# Patient Record
Sex: FEMALE | Race: WHITE | Employment: OTHER | URBAN - NONMETROPOLITAN AREA
[De-identification: names, ages, dates, MRNs, and addresses within clinical notes are randomized per-mention and may not be internally consistent; named-entity substitution may affect disease eponyms.]

---

## 2017-02-10 PROBLEM — E11.59 DIABETES MELLITUS WITH CIRCULATORY COMPLICATION (HCC): Status: ACTIVE | Noted: 2017-02-10

## 2017-02-10 PROBLEM — E11.59: Status: ACTIVE | Noted: 2017-02-10

## 2017-02-10 PROBLEM — E11.59: Status: RESOLVED | Noted: 2017-02-10 | Resolved: 2017-02-10

## 2017-02-10 PROBLEM — E11.59 DIABETES MELLITUS WITH CIRCULATORY COMPLICATION (HCC): Status: RESOLVED | Noted: 2017-02-10 | Resolved: 2017-02-10

## 2017-03-25 ENCOUNTER — HOSPITAL ENCOUNTER (INPATIENT)
Age: 82
LOS: 6 days | Discharge: NURSING, REHAB OR LONG-TERM CARE | DRG: 194 | End: 2017-03-31
Attending: EMERGENCY MEDICINE | Admitting: FAMILY MEDICINE
Payer: MEDICARE

## 2017-03-25 ENCOUNTER — APPOINTMENT (OUTPATIENT)
Dept: GENERAL RADIOLOGY | Age: 82
DRG: 194 | End: 2017-03-25
Payer: MEDICARE

## 2017-03-25 DIAGNOSIS — J18.9 PNEUMONIA OF LEFT LOWER LOBE DUE TO INFECTIOUS ORGANISM: Primary | ICD-10-CM

## 2017-03-25 DIAGNOSIS — I50.9 ACUTE ON CHRONIC CONGESTIVE HEART FAILURE, UNSPECIFIED CONGESTIVE HEART FAILURE TYPE: ICD-10-CM

## 2017-03-25 LAB
% CKMB: 4.9 % (ref 0–3)
-: ABNORMAL
ABSOLUTE EOS #: 0.3 K/UL (ref 0–0.4)
ABSOLUTE LYMPH #: 1.2 K/UL (ref 1–4.8)
ABSOLUTE MONO #: 0.3 K/UL (ref 0–1)
ALLEN TEST: ABNORMAL
AMORPHOUS: ABNORMAL
ANION GAP SERPL CALCULATED.3IONS-SCNC: 13 MMOL/L (ref 9–17)
BACTERIA: ABNORMAL
BASOPHILS # BLD: 1 % (ref 0–2)
BASOPHILS ABSOLUTE: 0.1 K/UL (ref 0–0.2)
BILIRUBIN URINE: NEGATIVE
BNP INTERPRETATION: ABNORMAL
BUN BLDV-MCNC: 39 MG/DL (ref 8–23)
BUN/CREAT BLD: 22 (ref 9–20)
CALCIUM SERPL-MCNC: 8.6 MG/DL (ref 8.6–10.4)
CARBOXYHEMOGLOBIN: ABNORMAL % (ref 0–5)
CASTS UA: ABNORMAL /LPF
CHLORIDE BLD-SCNC: 99 MMOL/L (ref 98–107)
CK MB: 2.8 NG/ML
CKMB INTERPRETATION: ABNORMAL
CO2: 26 MMOL/L (ref 20–31)
COLOR: YELLOW
COMMENT UA: ABNORMAL
CREAT SERPL-MCNC: 1.77 MG/DL (ref 0.5–0.9)
CRYSTALS, UA: ABNORMAL /HPF
DIFFERENTIAL TYPE: ABNORMAL
EOSINOPHILS RELATIVE PERCENT: 4 % (ref 0–8)
EPITHELIAL CELLS UA: ABNORMAL /HPF (ref 0–25)
FIO2: ABNORMAL
GFR AFRICAN AMERICAN: 33 ML/MIN
GFR NON-AFRICAN AMERICAN: 27 ML/MIN
GFR SERPL CREATININE-BSD FRML MDRD: ABNORMAL ML/MIN/{1.73_M2}
GFR SERPL CREATININE-BSD FRML MDRD: ABNORMAL ML/MIN/{1.73_M2}
GLUCOSE BLD-MCNC: 233 MG/DL (ref 70–99)
GLUCOSE BLD-MCNC: 314 MG/DL (ref 74–100)
GLUCOSE BLD-MCNC: 336 MG/DL (ref 74–100)
GLUCOSE BLD-MCNC: 353 MG/DL (ref 74–100)
GLUCOSE URINE: NEGATIVE
HCO3 VENOUS: 27 MMOL/L (ref 24–30)
HCT VFR BLD CALC: 29.1 % (ref 36–46)
HEMOGLOBIN: 9.2 G/DL (ref 12–16)
KETONES, URINE: NEGATIVE
LEUKOCYTE ESTERASE, URINE: ABNORMAL
LYMPHOCYTES # BLD: 15 % (ref 24–44)
MCH RBC QN AUTO: 26.5 PG (ref 26–34)
MCHC RBC AUTO-ENTMCNC: 31.7 G/DL (ref 31–37)
MCV RBC AUTO: 83.7 FL (ref 80–100)
METHEMOGLOBIN: ABNORMAL % (ref 0–1.9)
MODE: ABNORMAL
MONOCYTES # BLD: 4 % (ref 0–12)
MUCUS: ABNORMAL
NEGATIVE BASE EXCESS, VEN: ABNORMAL MMOL/L (ref 0–2)
NITRITE, URINE: NEGATIVE
NOTIFICATION TIME: ABNORMAL
NOTIFICATION: ABNORMAL
O2 DEVICE/FLOW/%: ABNORMAL
O2 SAT, VEN: 95.3 % (ref 60–85)
OTHER OBSERVATIONS UA: ABNORMAL
OXYHEMOGLOBIN: ABNORMAL % (ref 95–98)
PATIENT TEMP: 37
PCO2, VEN, TEMP ADJ: ABNORMAL MMHG (ref 39–55)
PCO2, VEN: 43 (ref 39–55)
PDW BLD-RTO: 16.8 % (ref 12.1–15.2)
PEEP/CPAP: ABNORMAL
PH UA: 5 (ref 5–9)
PH VENOUS: 7.42 (ref 7.32–7.42)
PH, VEN, TEMP ADJ: ABNORMAL (ref 7.32–7.42)
PLATELET # BLD: 264 K/UL (ref 140–450)
PLATELET ESTIMATE: ABNORMAL
PMV BLD AUTO: 9.9 FL (ref 6–12)
PO2, VEN, TEMP ADJ: ABNORMAL MMHG (ref 30–50)
PO2, VEN: 75.8 (ref 30–50)
POSITIVE BASE EXCESS, VEN: 2.1 MMOL/L (ref 0–2)
POTASSIUM SERPL-SCNC: 4.5 MMOL/L (ref 3.7–5.3)
PRO-BNP: 7052 PG/ML
PROTEIN UA: ABNORMAL
PSV: ABNORMAL
PT. POSITION: ABNORMAL
RBC # BLD: 3.48 M/UL (ref 4–5.2)
RBC # BLD: ABNORMAL 10*6/UL
RBC UA: ABNORMAL /HPF (ref 0–2)
RENAL EPITHELIAL, UA: ABNORMAL /HPF
RESPIRATORY RATE: 20
SAMPLE SITE: ABNORMAL
SEG NEUTROPHILS: 76 % (ref 36–66)
SEGMENTED NEUTROPHILS ABSOLUTE COUNT: 6.4 K/UL (ref 1.8–7.7)
SET RATE: ABNORMAL
SODIUM BLD-SCNC: 138 MMOL/L (ref 135–144)
SPECIFIC GRAVITY UA: 1.01 (ref 1.01–1.02)
TEXT FOR RESPIRATORY: ABNORMAL
TOTAL CK: 57 U/L (ref 26–192)
TOTAL HB: ABNORMAL G/DL (ref 12–16)
TOTAL RATE: ABNORMAL
TRICHOMONAS: ABNORMAL
TROPONIN INTERP: NORMAL
TROPONIN T: <0.03 NG/ML
TURBIDITY: CLEAR
URINE HGB: ABNORMAL
UROBILINOGEN, URINE: NORMAL
VT: ABNORMAL
WBC # BLD: 8.4 K/UL (ref 3.5–11)
WBC # BLD: ABNORMAL 10*3/UL
WBC UA: ABNORMAL /HPF (ref 0–5)
YEAST: ABNORMAL

## 2017-03-25 PROCEDURE — 82550 ASSAY OF CK (CPK): CPT

## 2017-03-25 PROCEDURE — 2580000003 HC RX 258: Performed by: FAMILY MEDICINE

## 2017-03-25 PROCEDURE — 82553 CREATINE MB FRACTION: CPT

## 2017-03-25 PROCEDURE — 85025 COMPLETE CBC W/AUTO DIFF WBC: CPT

## 2017-03-25 PROCEDURE — 99285 EMERGENCY DEPT VISIT HI MDM: CPT

## 2017-03-25 PROCEDURE — 94640 AIRWAY INHALATION TREATMENT: CPT

## 2017-03-25 PROCEDURE — 2580000003 HC RX 258: Performed by: EMERGENCY MEDICINE

## 2017-03-25 PROCEDURE — 94760 N-INVAS EAR/PLS OXIMETRY 1: CPT

## 2017-03-25 PROCEDURE — G8997 SWALLOW GOAL STATUS: HCPCS

## 2017-03-25 PROCEDURE — 6370000000 HC RX 637 (ALT 250 FOR IP): Performed by: INTERNAL MEDICINE

## 2017-03-25 PROCEDURE — 6360000002 HC RX W HCPCS: Performed by: EMERGENCY MEDICINE

## 2017-03-25 PROCEDURE — 6360000002 HC RX W HCPCS: Performed by: FAMILY MEDICINE

## 2017-03-25 PROCEDURE — 82947 ASSAY GLUCOSE BLOOD QUANT: CPT

## 2017-03-25 PROCEDURE — 6370000000 HC RX 637 (ALT 250 FOR IP): Performed by: EMERGENCY MEDICINE

## 2017-03-25 PROCEDURE — 83880 ASSAY OF NATRIURETIC PEPTIDE: CPT

## 2017-03-25 PROCEDURE — G8996 SWALLOW CURRENT STATUS: HCPCS

## 2017-03-25 PROCEDURE — 36415 COLL VENOUS BLD VENIPUNCTURE: CPT

## 2017-03-25 PROCEDURE — 1200000000 HC SEMI PRIVATE

## 2017-03-25 PROCEDURE — 94664 DEMO&/EVAL PT USE INHALER: CPT

## 2017-03-25 PROCEDURE — 93005 ELECTROCARDIOGRAM TRACING: CPT

## 2017-03-25 PROCEDURE — 84484 ASSAY OF TROPONIN QUANT: CPT

## 2017-03-25 PROCEDURE — 96375 TX/PRO/DX INJ NEW DRUG ADDON: CPT

## 2017-03-25 PROCEDURE — 82805 BLOOD GASES W/O2 SATURATION: CPT

## 2017-03-25 PROCEDURE — 92526 ORAL FUNCTION THERAPY: CPT

## 2017-03-25 PROCEDURE — 87040 BLOOD CULTURE FOR BACTERIA: CPT

## 2017-03-25 PROCEDURE — G8998 SWALLOW D/C STATUS: HCPCS

## 2017-03-25 PROCEDURE — 81001 URINALYSIS AUTO W/SCOPE: CPT

## 2017-03-25 PROCEDURE — 6370000000 HC RX 637 (ALT 250 FOR IP): Performed by: FAMILY MEDICINE

## 2017-03-25 PROCEDURE — 94667 MNPJ CHEST WALL 1ST: CPT

## 2017-03-25 PROCEDURE — 80048 BASIC METABOLIC PNL TOTAL CA: CPT

## 2017-03-25 PROCEDURE — 71020 XR CHEST STANDARD TWO VW: CPT

## 2017-03-25 PROCEDURE — 96365 THER/PROPH/DIAG IV INF INIT: CPT

## 2017-03-25 RX ORDER — SODIUM CHLORIDE 9 MG/ML
INJECTION, SOLUTION INTRAVENOUS CONTINUOUS
Status: DISCONTINUED | OUTPATIENT
Start: 2017-03-25 | End: 2017-03-26

## 2017-03-25 RX ORDER — ALLOPURINOL 300 MG/1
300 TABLET ORAL DAILY
Status: DISCONTINUED | OUTPATIENT
Start: 2017-03-25 | End: 2017-03-25 | Stop reason: DRUGHIGH

## 2017-03-25 RX ORDER — FUROSEMIDE 10 MG/ML
60 INJECTION INTRAMUSCULAR; INTRAVENOUS ONCE
Status: COMPLETED | OUTPATIENT
Start: 2017-03-25 | End: 2017-03-25

## 2017-03-25 RX ORDER — ALBUTEROL SULFATE 2.5 MG/3ML
2.5 SOLUTION RESPIRATORY (INHALATION) EVERY 4 HOURS PRN
Status: DISCONTINUED | OUTPATIENT
Start: 2017-03-25 | End: 2017-03-31 | Stop reason: HOSPADM

## 2017-03-25 RX ORDER — ACETAMINOPHEN 325 MG/1
650 TABLET ORAL EVERY 4 HOURS PRN
Status: DISCONTINUED | OUTPATIENT
Start: 2017-03-25 | End: 2017-03-31 | Stop reason: HOSPADM

## 2017-03-25 RX ORDER — IPRATROPIUM BROMIDE AND ALBUTEROL SULFATE 2.5; .5 MG/3ML; MG/3ML
1 SOLUTION RESPIRATORY (INHALATION) 4 TIMES DAILY
Status: DISCONTINUED | OUTPATIENT
Start: 2017-03-25 | End: 2017-03-31 | Stop reason: HOSPADM

## 2017-03-25 RX ORDER — IPRATROPIUM BROMIDE AND ALBUTEROL SULFATE 2.5; .5 MG/3ML; MG/3ML
1 SOLUTION RESPIRATORY (INHALATION) ONCE
Status: COMPLETED | OUTPATIENT
Start: 2017-03-25 | End: 2017-03-25

## 2017-03-25 RX ORDER — SODIUM CHLORIDE 0.9 % (FLUSH) 0.9 %
10 SYRINGE (ML) INJECTION EVERY 12 HOURS SCHEDULED
Status: DISCONTINUED | OUTPATIENT
Start: 2017-03-25 | End: 2017-03-31 | Stop reason: HOSPADM

## 2017-03-25 RX ORDER — ROSUVASTATIN CALCIUM 5 MG/1
5 TABLET, COATED ORAL NIGHTLY
Status: DISCONTINUED | OUTPATIENT
Start: 2017-03-25 | End: 2017-03-29 | Stop reason: CLARIF

## 2017-03-25 RX ORDER — LEVOTHYROXINE SODIUM 88 UG/1
88 TABLET ORAL DAILY
Status: DISCONTINUED | OUTPATIENT
Start: 2017-03-25 | End: 2017-03-31 | Stop reason: HOSPADM

## 2017-03-25 RX ORDER — GABAPENTIN 100 MG/1
100 CAPSULE ORAL 3 TIMES DAILY
Status: DISCONTINUED | OUTPATIENT
Start: 2017-03-25 | End: 2017-03-31 | Stop reason: HOSPADM

## 2017-03-25 RX ORDER — FLUTICASONE PROPIONATE 50 MCG
1 SPRAY, SUSPENSION (ML) NASAL DAILY
Status: DISCONTINUED | OUTPATIENT
Start: 2017-03-25 | End: 2017-03-31 | Stop reason: HOSPADM

## 2017-03-25 RX ORDER — ONDANSETRON 2 MG/ML
4 INJECTION INTRAMUSCULAR; INTRAVENOUS EVERY 6 HOURS PRN
Status: DISCONTINUED | OUTPATIENT
Start: 2017-03-25 | End: 2017-03-31 | Stop reason: HOSPADM

## 2017-03-25 RX ORDER — DEXTROSE MONOHYDRATE 50 MG/ML
100 INJECTION, SOLUTION INTRAVENOUS PRN
Status: DISCONTINUED | OUTPATIENT
Start: 2017-03-25 | End: 2017-03-31 | Stop reason: HOSPADM

## 2017-03-25 RX ORDER — DEXTROSE MONOHYDRATE 25 G/50ML
12.5 INJECTION, SOLUTION INTRAVENOUS PRN
Status: DISCONTINUED | OUTPATIENT
Start: 2017-03-25 | End: 2017-03-31 | Stop reason: HOSPADM

## 2017-03-25 RX ORDER — ALLOPURINOL 100 MG/1
200 TABLET ORAL DAILY
Status: DISCONTINUED | OUTPATIENT
Start: 2017-03-25 | End: 2017-03-31 | Stop reason: HOSPADM

## 2017-03-25 RX ORDER — NICOTINE POLACRILEX 4 MG
15 LOZENGE BUCCAL PRN
Status: DISCONTINUED | OUTPATIENT
Start: 2017-03-25 | End: 2017-03-31 | Stop reason: HOSPADM

## 2017-03-25 RX ORDER — MECLIZINE HYDROCHLORIDE 25 MG/1
25 TABLET ORAL 3 TIMES DAILY PRN
Status: DISCONTINUED | OUTPATIENT
Start: 2017-03-25 | End: 2017-03-31 | Stop reason: HOSPADM

## 2017-03-25 RX ORDER — SODIUM CHLORIDE 0.9 % (FLUSH) 0.9 %
10 SYRINGE (ML) INJECTION PRN
Status: DISCONTINUED | OUTPATIENT
Start: 2017-03-25 | End: 2017-03-31 | Stop reason: HOSPADM

## 2017-03-25 RX ORDER — METHYLPREDNISOLONE SODIUM SUCCINATE 125 MG/2ML
125 INJECTION, POWDER, LYOPHILIZED, FOR SOLUTION INTRAMUSCULAR; INTRAVENOUS ONCE
Status: COMPLETED | OUTPATIENT
Start: 2017-03-25 | End: 2017-03-25

## 2017-03-25 RX ORDER — AMLODIPINE BESYLATE 10 MG/1
10 TABLET ORAL DAILY
Status: DISCONTINUED | OUTPATIENT
Start: 2017-03-25 | End: 2017-03-29

## 2017-03-25 RX ORDER — FERROUS SULFATE 325(65) MG
325 TABLET ORAL
Status: DISCONTINUED | OUTPATIENT
Start: 2017-03-25 | End: 2017-03-31 | Stop reason: HOSPADM

## 2017-03-25 RX ORDER — HYDROCHLOROTHIAZIDE 25 MG/1
12.5 TABLET ORAL DAILY
Status: DISCONTINUED | OUTPATIENT
Start: 2017-03-25 | End: 2017-03-27

## 2017-03-25 RX ORDER — IPRATROPIUM BROMIDE AND ALBUTEROL SULFATE 2.5; .5 MG/3ML; MG/3ML
1 SOLUTION RESPIRATORY (INHALATION)
Status: DISCONTINUED | OUTPATIENT
Start: 2017-03-25 | End: 2017-03-25

## 2017-03-25 RX ORDER — CLOPIDOGREL BISULFATE 75 MG/1
75 TABLET ORAL DAILY
Status: DISCONTINUED | OUTPATIENT
Start: 2017-03-25 | End: 2017-03-31 | Stop reason: HOSPADM

## 2017-03-25 RX ADMIN — HYDROCHLOROTHIAZIDE 12.5 MG: 25 TABLET ORAL at 09:30

## 2017-03-25 RX ADMIN — ALLOPURINOL 200 MG: 100 TABLET ORAL at 10:17

## 2017-03-25 RX ADMIN — INSULIN LISPRO 2 UNITS: 100 INJECTION, SOLUTION INTRAVENOUS; SUBCUTANEOUS at 20:53

## 2017-03-25 RX ADMIN — IPRATROPIUM BROMIDE AND ALBUTEROL SULFATE 1 AMPULE: .5; 3 SOLUTION RESPIRATORY (INHALATION) at 21:51

## 2017-03-25 RX ADMIN — AZITHROMYCIN MONOHYDRATE 500 MG: 500 INJECTION, POWDER, LYOPHILIZED, FOR SOLUTION INTRAVENOUS at 05:56

## 2017-03-25 RX ADMIN — METFORMIN HYDROCHLORIDE 1000 MG: 500 TABLET, FILM COATED ORAL at 16:45

## 2017-03-25 RX ADMIN — CLOPIDOGREL BISULFATE 75 MG: 75 TABLET ORAL at 10:20

## 2017-03-25 RX ADMIN — IPRATROPIUM BROMIDE AND ALBUTEROL SULFATE 1 AMPULE: .5; 3 SOLUTION RESPIRATORY (INHALATION) at 03:11

## 2017-03-25 RX ADMIN — ENOXAPARIN SODIUM 30 MG: 30 INJECTION SUBCUTANEOUS at 09:30

## 2017-03-25 RX ADMIN — FLUTICASONE PROPIONATE 1 SPRAY: 50 SPRAY, METERED NASAL at 10:17

## 2017-03-25 RX ADMIN — GABAPENTIN 100 MG: 100 CAPSULE ORAL at 10:18

## 2017-03-25 RX ADMIN — FUROSEMIDE 60 MG: 10 INJECTION, SOLUTION INTRAMUSCULAR; INTRAVENOUS at 04:42

## 2017-03-25 RX ADMIN — LEVOTHYROXINE SODIUM 88 MCG: 88 TABLET ORAL at 10:18

## 2017-03-25 RX ADMIN — FERROUS SULFATE TAB 325 MG (65 MG ELEMENTAL FE) 325 MG: 325 (65 FE) TAB at 10:20

## 2017-03-25 RX ADMIN — METFORMIN HYDROCHLORIDE 1000 MG: 500 TABLET, FILM COATED ORAL at 09:30

## 2017-03-25 RX ADMIN — INSULIN LISPRO 5 UNITS: 100 INJECTION, SOLUTION INTRAVENOUS; SUBCUTANEOUS at 16:45

## 2017-03-25 RX ADMIN — AMLODIPINE BESYLATE 10 MG: 10 TABLET ORAL at 10:18

## 2017-03-25 RX ADMIN — METHYLPREDNISOLONE SODIUM SUCCINATE 125 MG: 125 INJECTION, POWDER, FOR SOLUTION INTRAMUSCULAR; INTRAVENOUS at 03:26

## 2017-03-25 RX ADMIN — GABAPENTIN 100 MG: 100 CAPSULE ORAL at 20:53

## 2017-03-25 RX ADMIN — IPRATROPIUM BROMIDE AND ALBUTEROL SULFATE 1 AMPULE: .5; 3 SOLUTION RESPIRATORY (INHALATION) at 14:08

## 2017-03-25 RX ADMIN — SODIUM CHLORIDE: 9 INJECTION, SOLUTION INTRAVENOUS at 10:26

## 2017-03-25 RX ADMIN — GABAPENTIN 100 MG: 100 CAPSULE ORAL at 14:30

## 2017-03-25 RX ADMIN — INSULIN LISPRO 4 UNITS: 100 INJECTION, SOLUTION INTRAVENOUS; SUBCUTANEOUS at 11:59

## 2017-03-25 RX ADMIN — CEFTRIAXONE 1 G: 1 INJECTION, POWDER, FOR SOLUTION INTRAMUSCULAR; INTRAVENOUS at 05:26

## 2017-03-25 ASSESSMENT — ENCOUNTER SYMPTOMS
COUGH: 1
ABDOMINAL DISTENTION: 0
DIARRHEA: 0
FACIAL SWELLING: 0
BACK PAIN: 0
CHOKING: 0
CHEST TIGHTNESS: 0
CONSTIPATION: 0
NAUSEA: 0
VOMITING: 0
SHORTNESS OF BREATH: 1
TROUBLE SWALLOWING: 0
SORE THROAT: 0
ABDOMINAL PAIN: 0
WHEEZING: 1

## 2017-03-26 LAB
ABSOLUTE EOS #: 0 K/UL (ref 0–0.4)
ABSOLUTE LYMPH #: 0.87 K/UL (ref 1–4.8)
ABSOLUTE MONO #: 0 K/UL (ref 0–1)
ALBUMIN SERPL-MCNC: 3.3 G/DL (ref 3.5–5.2)
ALBUMIN/GLOBULIN RATIO: 0.9 (ref 1–2.5)
ALP BLD-CCNC: 79 U/L (ref 35–104)
ALT SERPL-CCNC: 9 U/L (ref 5–33)
ANION GAP SERPL CALCULATED.3IONS-SCNC: 14 MMOL/L (ref 9–17)
AST SERPL-CCNC: 11 U/L
BASOPHILS # BLD: 0 % (ref 0–2)
BASOPHILS ABSOLUTE: 0 K/UL (ref 0–0.2)
BILIRUB SERPL-MCNC: <0.1 MG/DL (ref 0.3–1.2)
BUN BLDV-MCNC: 53 MG/DL (ref 8–23)
BUN/CREAT BLD: 23 (ref 9–20)
CALCIUM SERPL-MCNC: 8.2 MG/DL (ref 8.6–10.4)
CHLORIDE BLD-SCNC: 99 MMOL/L (ref 98–107)
CO2: 25 MMOL/L (ref 20–31)
CREAT SERPL-MCNC: 2.26 MG/DL (ref 0.5–0.9)
DIFFERENTIAL TYPE: ABNORMAL
EOSINOPHILS RELATIVE PERCENT: 0 % (ref 0–8)
GFR AFRICAN AMERICAN: 25 ML/MIN
GFR NON-AFRICAN AMERICAN: 20 ML/MIN
GFR SERPL CREATININE-BSD FRML MDRD: ABNORMAL ML/MIN/{1.73_M2}
GFR SERPL CREATININE-BSD FRML MDRD: ABNORMAL ML/MIN/{1.73_M2}
GLUCOSE BLD-MCNC: 177 MG/DL (ref 74–100)
GLUCOSE BLD-MCNC: 233 MG/DL (ref 74–100)
GLUCOSE BLD-MCNC: 260 MG/DL (ref 70–99)
GLUCOSE BLD-MCNC: 285 MG/DL (ref 74–100)
GLUCOSE BLD-MCNC: 308 MG/DL (ref 74–100)
HCT VFR BLD CALC: 26.1 % (ref 36–46)
HEMOGLOBIN: 8.2 G/DL (ref 12–16)
LYMPHOCYTES # BLD: 11 % (ref 24–44)
MCH RBC QN AUTO: 26.2 PG (ref 26–34)
MCHC RBC AUTO-ENTMCNC: 31.4 G/DL (ref 31–37)
MCV RBC AUTO: 83.5 FL (ref 80–100)
MONOCYTES # BLD: 0 % (ref 0–12)
MORPHOLOGY: NORMAL
PDW BLD-RTO: 16.8 % (ref 12.1–15.2)
PLATELET # BLD: 256 K/UL (ref 140–450)
PLATELET ESTIMATE: ABNORMAL
PMV BLD AUTO: 10.2 FL (ref 6–12)
POTASSIUM SERPL-SCNC: 5.2 MMOL/L (ref 3.7–5.3)
RBC # BLD: 3.12 M/UL (ref 4–5.2)
RBC # BLD: ABNORMAL 10*6/UL
SEG NEUTROPHILS: 89 % (ref 36–66)
SEGMENTED NEUTROPHILS ABSOLUTE COUNT: 7.03 K/UL (ref 1.8–7.7)
SODIUM BLD-SCNC: 138 MMOL/L (ref 135–144)
TOTAL PROTEIN: 6.8 G/DL (ref 6.4–8.3)
WBC # BLD: 7.9 K/UL (ref 3.5–11)
WBC # BLD: ABNORMAL 10*3/UL

## 2017-03-26 PROCEDURE — 6370000000 HC RX 637 (ALT 250 FOR IP): Performed by: INTERNAL MEDICINE

## 2017-03-26 PROCEDURE — 94668 MNPJ CHEST WALL SBSQ: CPT

## 2017-03-26 PROCEDURE — 1200000000 HC SEMI PRIVATE

## 2017-03-26 PROCEDURE — 94640 AIRWAY INHALATION TREATMENT: CPT

## 2017-03-26 PROCEDURE — 85025 COMPLETE CBC W/AUTO DIFF WBC: CPT

## 2017-03-26 PROCEDURE — 80053 COMPREHEN METABOLIC PANEL: CPT

## 2017-03-26 PROCEDURE — 2580000003 HC RX 258: Performed by: FAMILY MEDICINE

## 2017-03-26 PROCEDURE — 82947 ASSAY GLUCOSE BLOOD QUANT: CPT

## 2017-03-26 PROCEDURE — 6360000002 HC RX W HCPCS: Performed by: FAMILY MEDICINE

## 2017-03-26 PROCEDURE — 94664 DEMO&/EVAL PT USE INHALER: CPT

## 2017-03-26 PROCEDURE — 36415 COLL VENOUS BLD VENIPUNCTURE: CPT

## 2017-03-26 PROCEDURE — 94760 N-INVAS EAR/PLS OXIMETRY 1: CPT

## 2017-03-26 PROCEDURE — 6370000000 HC RX 637 (ALT 250 FOR IP): Performed by: FAMILY MEDICINE

## 2017-03-26 RX ORDER — FUROSEMIDE 10 MG/ML
40 INJECTION INTRAMUSCULAR; INTRAVENOUS ONCE
Status: COMPLETED | OUTPATIENT
Start: 2017-03-26 | End: 2017-03-26

## 2017-03-26 RX ADMIN — Medication 10 ML: at 21:36

## 2017-03-26 RX ADMIN — IPRATROPIUM BROMIDE AND ALBUTEROL SULFATE 1 AMPULE: .5; 3 SOLUTION RESPIRATORY (INHALATION) at 21:50

## 2017-03-26 RX ADMIN — ALLOPURINOL 200 MG: 100 TABLET ORAL at 09:39

## 2017-03-26 RX ADMIN — ENOXAPARIN SODIUM 30 MG: 30 INJECTION SUBCUTANEOUS at 09:40

## 2017-03-26 RX ADMIN — LEVOTHYROXINE SODIUM 88 MCG: 88 TABLET ORAL at 07:44

## 2017-03-26 RX ADMIN — GABAPENTIN 100 MG: 100 CAPSULE ORAL at 21:36

## 2017-03-26 RX ADMIN — HYDROCHLOROTHIAZIDE 12.5 MG: 25 TABLET ORAL at 09:40

## 2017-03-26 RX ADMIN — IPRATROPIUM BROMIDE AND ALBUTEROL SULFATE 1 AMPULE: .5; 3 SOLUTION RESPIRATORY (INHALATION) at 15:56

## 2017-03-26 RX ADMIN — FLUTICASONE PROPIONATE 1 SPRAY: 50 SPRAY, METERED NASAL at 09:00

## 2017-03-26 RX ADMIN — INSULIN LISPRO 3 UNITS: 100 INJECTION, SOLUTION INTRAVENOUS; SUBCUTANEOUS at 12:32

## 2017-03-26 RX ADMIN — INSULIN LISPRO 1 UNITS: 100 INJECTION, SOLUTION INTRAVENOUS; SUBCUTANEOUS at 17:32

## 2017-03-26 RX ADMIN — FERROUS SULFATE TAB 325 MG (65 MG ELEMENTAL FE) 325 MG: 325 (65 FE) TAB at 07:45

## 2017-03-26 RX ADMIN — ACETAMINOPHEN 650 MG: 325 TABLET, FILM COATED ORAL at 05:54

## 2017-03-26 RX ADMIN — INSULIN LISPRO 4 UNITS: 100 INJECTION, SOLUTION INTRAVENOUS; SUBCUTANEOUS at 07:49

## 2017-03-26 RX ADMIN — CLOPIDOGREL BISULFATE 75 MG: 75 TABLET ORAL at 09:40

## 2017-03-26 RX ADMIN — IPRATROPIUM BROMIDE AND ALBUTEROL SULFATE 1 AMPULE: .5; 3 SOLUTION RESPIRATORY (INHALATION) at 10:29

## 2017-03-26 RX ADMIN — CEFTRIAXONE 1 G: 1 INJECTION, POWDER, FOR SOLUTION INTRAMUSCULAR; INTRAVENOUS at 04:56

## 2017-03-26 RX ADMIN — AZITHROMYCIN MONOHYDRATE 500 MG: 500 INJECTION, POWDER, LYOPHILIZED, FOR SOLUTION INTRAVENOUS at 05:54

## 2017-03-26 RX ADMIN — GABAPENTIN 100 MG: 100 CAPSULE ORAL at 09:40

## 2017-03-26 RX ADMIN — INSULIN LISPRO 1 UNITS: 100 INJECTION, SOLUTION INTRAVENOUS; SUBCUTANEOUS at 21:36

## 2017-03-26 RX ADMIN — IPRATROPIUM BROMIDE AND ALBUTEROL SULFATE 1 AMPULE: .5; 3 SOLUTION RESPIRATORY (INHALATION) at 05:35

## 2017-03-26 RX ADMIN — GABAPENTIN 100 MG: 100 CAPSULE ORAL at 14:17

## 2017-03-26 RX ADMIN — FUROSEMIDE 40 MG: 10 INJECTION, SOLUTION INTRAMUSCULAR; INTRAVENOUS at 11:20

## 2017-03-26 RX ADMIN — METFORMIN HYDROCHLORIDE 1000 MG: 500 TABLET, FILM COATED ORAL at 07:44

## 2017-03-26 RX ADMIN — AMLODIPINE BESYLATE 10 MG: 10 TABLET ORAL at 09:40

## 2017-03-26 ASSESSMENT — PAIN SCALES - GENERAL
PAINLEVEL_OUTOF10: 0
PAINLEVEL_OUTOF10: 0
PAINLEVEL_OUTOF10: 7

## 2017-03-27 LAB
ABSOLUTE EOS #: 0.1 K/UL (ref 0–0.4)
ABSOLUTE LYMPH #: 1.5 K/UL (ref 1–4.8)
ABSOLUTE MONO #: 0.4 K/UL (ref 0–1)
ALBUMIN SERPL-MCNC: 3.4 G/DL (ref 3.5–5.2)
ALBUMIN/GLOBULIN RATIO: 0.9 (ref 1–2.5)
ALP BLD-CCNC: 80 U/L (ref 35–104)
ALT SERPL-CCNC: 10 U/L (ref 5–33)
ANION GAP SERPL CALCULATED.3IONS-SCNC: 14 MMOL/L (ref 9–17)
AST SERPL-CCNC: 13 U/L
BASOPHILS # BLD: 1 % (ref 0–2)
BASOPHILS ABSOLUTE: 0 K/UL (ref 0–0.2)
BILIRUB SERPL-MCNC: <0.1 MG/DL (ref 0.3–1.2)
BUN BLDV-MCNC: 62 MG/DL (ref 8–23)
BUN/CREAT BLD: 26 (ref 9–20)
CALCIUM SERPL-MCNC: 8.4 MG/DL (ref 8.6–10.4)
CHLORIDE BLD-SCNC: 97 MMOL/L (ref 98–107)
CO2: 26 MMOL/L (ref 20–31)
CREAT SERPL-MCNC: 2.37 MG/DL (ref 0.5–0.9)
DIFFERENTIAL TYPE: ABNORMAL
EKG ATRIAL RATE: 78 BPM
EKG P AXIS: 55 DEGREES
EKG P-R INTERVAL: 150 MS
EKG Q-T INTERVAL: 384 MS
EKG QRS DURATION: 84 MS
EKG QTC CALCULATION (BAZETT): 437 MS
EKG R AXIS: 63 DEGREES
EKG T AXIS: 14 DEGREES
EKG VENTRICULAR RATE: 78 BPM
EOSINOPHILS RELATIVE PERCENT: 1 % (ref 0–8)
GFR AFRICAN AMERICAN: 23 ML/MIN
GFR NON-AFRICAN AMERICAN: 19 ML/MIN
GFR SERPL CREATININE-BSD FRML MDRD: ABNORMAL ML/MIN/{1.73_M2}
GFR SERPL CREATININE-BSD FRML MDRD: ABNORMAL ML/MIN/{1.73_M2}
GLUCOSE BLD-MCNC: 184 MG/DL (ref 74–100)
GLUCOSE BLD-MCNC: 200 MG/DL (ref 74–100)
GLUCOSE BLD-MCNC: 205 MG/DL (ref 74–100)
GLUCOSE BLD-MCNC: 221 MG/DL (ref 74–100)
GLUCOSE BLD-MCNC: 230 MG/DL (ref 70–99)
HCT VFR BLD CALC: 26.3 % (ref 36–46)
HEMOGLOBIN: 8.4 G/DL (ref 12–16)
LYMPHOCYTES # BLD: 16 % (ref 24–44)
MCH RBC QN AUTO: 26.8 PG (ref 26–34)
MCHC RBC AUTO-ENTMCNC: 32 G/DL (ref 31–37)
MCV RBC AUTO: 83.8 FL (ref 80–100)
MONOCYTES # BLD: 4 % (ref 0–12)
PDW BLD-RTO: 17.4 % (ref 12.1–15.2)
PLATELET # BLD: 288 K/UL (ref 140–450)
PLATELET ESTIMATE: ABNORMAL
PMV BLD AUTO: 9.6 FL (ref 6–12)
POTASSIUM SERPL-SCNC: 4.7 MMOL/L (ref 3.7–5.3)
RBC # BLD: 3.14 M/UL (ref 4–5.2)
RBC # BLD: ABNORMAL 10*6/UL
SEG NEUTROPHILS: 78 % (ref 36–66)
SEGMENTED NEUTROPHILS ABSOLUTE COUNT: 7.5 K/UL (ref 1.8–7.7)
SODIUM BLD-SCNC: 137 MMOL/L (ref 135–144)
TOTAL PROTEIN: 7 G/DL (ref 6.4–8.3)
WBC # BLD: 9.5 K/UL (ref 3.5–11)
WBC # BLD: ABNORMAL 10*3/UL

## 2017-03-27 PROCEDURE — 6360000002 HC RX W HCPCS: Performed by: FAMILY MEDICINE

## 2017-03-27 PROCEDURE — 1200000000 HC SEMI PRIVATE

## 2017-03-27 PROCEDURE — 82947 ASSAY GLUCOSE BLOOD QUANT: CPT

## 2017-03-27 PROCEDURE — 94762 N-INVAS EAR/PLS OXIMTRY CONT: CPT

## 2017-03-27 PROCEDURE — G8978 MOBILITY CURRENT STATUS: HCPCS

## 2017-03-27 PROCEDURE — 80053 COMPREHEN METABOLIC PANEL: CPT

## 2017-03-27 PROCEDURE — 97530 THERAPEUTIC ACTIVITIES: CPT

## 2017-03-27 PROCEDURE — 94664 DEMO&/EVAL PT USE INHALER: CPT

## 2017-03-27 PROCEDURE — G8979 MOBILITY GOAL STATUS: HCPCS

## 2017-03-27 PROCEDURE — 6370000000 HC RX 637 (ALT 250 FOR IP): Performed by: INTERNAL MEDICINE

## 2017-03-27 PROCEDURE — 2580000003 HC RX 258: Performed by: FAMILY MEDICINE

## 2017-03-27 PROCEDURE — 97110 THERAPEUTIC EXERCISES: CPT

## 2017-03-27 PROCEDURE — 6370000000 HC RX 637 (ALT 250 FOR IP): Performed by: FAMILY MEDICINE

## 2017-03-27 PROCEDURE — 94640 AIRWAY INHALATION TREATMENT: CPT

## 2017-03-27 PROCEDURE — 94668 MNPJ CHEST WALL SBSQ: CPT

## 2017-03-27 PROCEDURE — 2580000003 HC RX 258: Performed by: INTERNAL MEDICINE

## 2017-03-27 PROCEDURE — 36415 COLL VENOUS BLD VENIPUNCTURE: CPT

## 2017-03-27 PROCEDURE — 97162 PT EVAL MOD COMPLEX 30 MIN: CPT

## 2017-03-27 PROCEDURE — 85025 COMPLETE CBC W/AUTO DIFF WBC: CPT

## 2017-03-27 RX ORDER — SODIUM CHLORIDE 9 MG/ML
INJECTION, SOLUTION INTRAVENOUS CONTINUOUS
Status: DISCONTINUED | OUTPATIENT
Start: 2017-03-27 | End: 2017-03-28

## 2017-03-27 RX ADMIN — Medication 10 ML: at 05:08

## 2017-03-27 RX ADMIN — INSULIN LISPRO 2 UNITS: 100 INJECTION, SOLUTION INTRAVENOUS; SUBCUTANEOUS at 12:44

## 2017-03-27 RX ADMIN — CEFTRIAXONE 1 G: 1 INJECTION, POWDER, FOR SOLUTION INTRAMUSCULAR; INTRAVENOUS at 05:09

## 2017-03-27 RX ADMIN — IPRATROPIUM BROMIDE AND ALBUTEROL SULFATE 1 AMPULE: .5; 3 SOLUTION RESPIRATORY (INHALATION) at 15:52

## 2017-03-27 RX ADMIN — GABAPENTIN 100 MG: 100 CAPSULE ORAL at 20:56

## 2017-03-27 RX ADMIN — ALLOPURINOL 200 MG: 100 TABLET ORAL at 08:24

## 2017-03-27 RX ADMIN — ACETAMINOPHEN 650 MG: 325 TABLET, FILM COATED ORAL at 19:20

## 2017-03-27 RX ADMIN — GABAPENTIN 100 MG: 100 CAPSULE ORAL at 08:23

## 2017-03-27 RX ADMIN — GABAPENTIN 100 MG: 100 CAPSULE ORAL at 14:02

## 2017-03-27 RX ADMIN — ENOXAPARIN SODIUM 30 MG: 30 INJECTION SUBCUTANEOUS at 08:23

## 2017-03-27 RX ADMIN — AMLODIPINE BESYLATE 10 MG: 10 TABLET ORAL at 08:24

## 2017-03-27 RX ADMIN — MECLIZINE HYDROCHLORIDE 25 MG: 25 TABLET ORAL at 20:56

## 2017-03-27 RX ADMIN — SODIUM CHLORIDE: 9 INJECTION, SOLUTION INTRAVENOUS at 17:15

## 2017-03-27 RX ADMIN — SODIUM CHLORIDE: 9 INJECTION, SOLUTION INTRAVENOUS at 07:21

## 2017-03-27 RX ADMIN — FERROUS SULFATE TAB 325 MG (65 MG ELEMENTAL FE) 325 MG: 325 (65 FE) TAB at 08:24

## 2017-03-27 RX ADMIN — IPRATROPIUM BROMIDE AND ALBUTEROL SULFATE 1 AMPULE: .5; 3 SOLUTION RESPIRATORY (INHALATION) at 05:16

## 2017-03-27 RX ADMIN — IPRATROPIUM BROMIDE AND ALBUTEROL SULFATE 1 AMPULE: .5; 3 SOLUTION RESPIRATORY (INHALATION) at 21:06

## 2017-03-27 RX ADMIN — INSULIN LISPRO 2 UNITS: 100 INJECTION, SOLUTION INTRAVENOUS; SUBCUTANEOUS at 08:11

## 2017-03-27 RX ADMIN — IPRATROPIUM BROMIDE AND ALBUTEROL SULFATE 1 AMPULE: .5; 3 SOLUTION RESPIRATORY (INHALATION) at 11:08

## 2017-03-27 RX ADMIN — CLOPIDOGREL BISULFATE 75 MG: 75 TABLET ORAL at 08:23

## 2017-03-27 RX ADMIN — INSULIN LISPRO 1 UNITS: 100 INJECTION, SOLUTION INTRAVENOUS; SUBCUTANEOUS at 21:00

## 2017-03-27 RX ADMIN — LEVOTHYROXINE SODIUM 88 MCG: 88 TABLET ORAL at 07:24

## 2017-03-27 RX ADMIN — AZITHROMYCIN MONOHYDRATE 500 MG: 500 INJECTION, POWDER, LYOPHILIZED, FOR SOLUTION INTRAVENOUS at 05:38

## 2017-03-27 RX ADMIN — INSULIN LISPRO 1 UNITS: 100 INJECTION, SOLUTION INTRAVENOUS; SUBCUTANEOUS at 16:52

## 2017-03-27 ASSESSMENT — PAIN DESCRIPTION - PAIN TYPE: TYPE: ACUTE PAIN

## 2017-03-27 ASSESSMENT — PAIN SCALES - GENERAL
PAINLEVEL_OUTOF10: 0
PAINLEVEL_OUTOF10: 0
PAINLEVEL_OUTOF10: 3
PAINLEVEL_OUTOF10: 0

## 2017-03-27 ASSESSMENT — PAIN DESCRIPTION - DESCRIPTORS: DESCRIPTORS: HEADACHE

## 2017-03-27 ASSESSMENT — PAIN DESCRIPTION - LOCATION: LOCATION: HEAD

## 2017-03-28 ENCOUNTER — APPOINTMENT (OUTPATIENT)
Dept: GENERAL RADIOLOGY | Age: 82
DRG: 194 | End: 2017-03-28
Payer: MEDICARE

## 2017-03-28 LAB
ABSOLUTE EOS #: 0.2 K/UL (ref 0–0.4)
ABSOLUTE LYMPH #: 0.9 K/UL (ref 1–4.8)
ABSOLUTE MONO #: 0.3 K/UL (ref 0–1)
ALBUMIN SERPL-MCNC: 3.5 G/DL (ref 3.5–5.2)
ALBUMIN/GLOBULIN RATIO: 1 (ref 1–2.5)
ALP BLD-CCNC: 80 U/L (ref 35–104)
ALT SERPL-CCNC: 13 U/L (ref 5–33)
ANION GAP SERPL CALCULATED.3IONS-SCNC: 17 MMOL/L (ref 9–17)
AST SERPL-CCNC: 17 U/L
BASOPHILS # BLD: 0 % (ref 0–2)
BASOPHILS ABSOLUTE: 0 K/UL (ref 0–0.2)
BILIRUB SERPL-MCNC: 0.18 MG/DL (ref 0.3–1.2)
BUN BLDV-MCNC: 60 MG/DL (ref 8–23)
BUN/CREAT BLD: 29 (ref 9–20)
CALCIUM SERPL-MCNC: 8.1 MG/DL (ref 8.6–10.4)
CHLORIDE BLD-SCNC: 99 MMOL/L (ref 98–107)
CO2: 23 MMOL/L (ref 20–31)
CREAT SERPL-MCNC: 2.04 MG/DL (ref 0.5–0.9)
DIFFERENTIAL TYPE: ABNORMAL
EOSINOPHILS RELATIVE PERCENT: 2 % (ref 0–8)
GFR AFRICAN AMERICAN: 28 ML/MIN
GFR NON-AFRICAN AMERICAN: 23 ML/MIN
GFR SERPL CREATININE-BSD FRML MDRD: ABNORMAL ML/MIN/{1.73_M2}
GFR SERPL CREATININE-BSD FRML MDRD: ABNORMAL ML/MIN/{1.73_M2}
GLUCOSE BLD-MCNC: 151 MG/DL (ref 74–100)
GLUCOSE BLD-MCNC: 162 MG/DL (ref 74–100)
GLUCOSE BLD-MCNC: 205 MG/DL (ref 74–100)
GLUCOSE BLD-MCNC: 208 MG/DL (ref 70–99)
GLUCOSE BLD-MCNC: 215 MG/DL (ref 74–100)
HCT VFR BLD CALC: 26.8 % (ref 36–46)
HEMOGLOBIN: 8.6 G/DL (ref 12–16)
LYMPHOCYTES # BLD: 13 % (ref 24–44)
MCH RBC QN AUTO: 26.8 PG (ref 26–34)
MCHC RBC AUTO-ENTMCNC: 31.9 G/DL (ref 31–37)
MCV RBC AUTO: 84 FL (ref 80–100)
MONOCYTES # BLD: 4 % (ref 0–12)
PDW BLD-RTO: 16.9 % (ref 12.1–15.2)
PLATELET # BLD: 265 K/UL (ref 140–450)
PLATELET ESTIMATE: ABNORMAL
PMV BLD AUTO: 10 FL (ref 6–12)
POTASSIUM SERPL-SCNC: 4.7 MMOL/L (ref 3.7–5.3)
RBC # BLD: 3.19 M/UL (ref 4–5.2)
RBC # BLD: ABNORMAL 10*6/UL
SEG NEUTROPHILS: 81 % (ref 36–66)
SEGMENTED NEUTROPHILS ABSOLUTE COUNT: 5.9 K/UL (ref 1.8–7.7)
SODIUM BLD-SCNC: 139 MMOL/L (ref 135–144)
TOTAL PROTEIN: 7 G/DL (ref 6.4–8.3)
WBC # BLD: 7.3 K/UL (ref 3.5–11)
WBC # BLD: ABNORMAL 10*3/UL

## 2017-03-28 PROCEDURE — 80053 COMPREHEN METABOLIC PANEL: CPT

## 2017-03-28 PROCEDURE — 97116 GAIT TRAINING THERAPY: CPT

## 2017-03-28 PROCEDURE — 97110 THERAPEUTIC EXERCISES: CPT

## 2017-03-28 PROCEDURE — 2580000003 HC RX 258: Performed by: FAMILY MEDICINE

## 2017-03-28 PROCEDURE — 94640 AIRWAY INHALATION TREATMENT: CPT

## 2017-03-28 PROCEDURE — 6370000000 HC RX 637 (ALT 250 FOR IP): Performed by: INTERNAL MEDICINE

## 2017-03-28 PROCEDURE — 71020 XR CHEST STANDARD TWO VW: CPT

## 2017-03-28 PROCEDURE — 2580000003 HC RX 258: Performed by: INTERNAL MEDICINE

## 2017-03-28 PROCEDURE — 94668 MNPJ CHEST WALL SBSQ: CPT

## 2017-03-28 PROCEDURE — 82947 ASSAY GLUCOSE BLOOD QUANT: CPT

## 2017-03-28 PROCEDURE — 6360000002 HC RX W HCPCS: Performed by: FAMILY MEDICINE

## 2017-03-28 PROCEDURE — 85025 COMPLETE CBC W/AUTO DIFF WBC: CPT

## 2017-03-28 PROCEDURE — 94664 DEMO&/EVAL PT USE INHALER: CPT

## 2017-03-28 PROCEDURE — 36415 COLL VENOUS BLD VENIPUNCTURE: CPT

## 2017-03-28 PROCEDURE — 94761 N-INVAS EAR/PLS OXIMETRY MLT: CPT

## 2017-03-28 PROCEDURE — 1200000000 HC SEMI PRIVATE

## 2017-03-28 PROCEDURE — 6360000002 HC RX W HCPCS: Performed by: INTERNAL MEDICINE

## 2017-03-28 PROCEDURE — 6370000000 HC RX 637 (ALT 250 FOR IP): Performed by: FAMILY MEDICINE

## 2017-03-28 RX ORDER — FUROSEMIDE 10 MG/ML
40 INJECTION INTRAMUSCULAR; INTRAVENOUS 2 TIMES DAILY
Status: DISCONTINUED | OUTPATIENT
Start: 2017-03-28 | End: 2017-03-29

## 2017-03-28 RX ADMIN — SODIUM CHLORIDE: 9 INJECTION, SOLUTION INTRAVENOUS at 03:11

## 2017-03-28 RX ADMIN — IPRATROPIUM BROMIDE AND ALBUTEROL SULFATE 1 AMPULE: .5; 3 SOLUTION RESPIRATORY (INHALATION) at 05:26

## 2017-03-28 RX ADMIN — MECLIZINE HYDROCHLORIDE 25 MG: 25 TABLET ORAL at 08:40

## 2017-03-28 RX ADMIN — INSULIN LISPRO 1 UNITS: 100 INJECTION, SOLUTION INTRAVENOUS; SUBCUTANEOUS at 16:41

## 2017-03-28 RX ADMIN — CLOPIDOGREL BISULFATE 75 MG: 75 TABLET ORAL at 08:40

## 2017-03-28 RX ADMIN — FERROUS SULFATE TAB 325 MG (65 MG ELEMENTAL FE) 325 MG: 325 (65 FE) TAB at 08:40

## 2017-03-28 RX ADMIN — ENOXAPARIN SODIUM 30 MG: 30 INJECTION SUBCUTANEOUS at 08:40

## 2017-03-28 RX ADMIN — FUROSEMIDE 40 MG: 10 INJECTION, SOLUTION INTRAMUSCULAR; INTRAVENOUS at 07:55

## 2017-03-28 RX ADMIN — GABAPENTIN 100 MG: 100 CAPSULE ORAL at 13:37

## 2017-03-28 RX ADMIN — ALLOPURINOL 200 MG: 100 TABLET ORAL at 08:40

## 2017-03-28 RX ADMIN — INSULIN LISPRO 2 UNITS: 100 INJECTION, SOLUTION INTRAVENOUS; SUBCUTANEOUS at 08:39

## 2017-03-28 RX ADMIN — MECLIZINE HYDROCHLORIDE 25 MG: 25 TABLET ORAL at 20:43

## 2017-03-28 RX ADMIN — LEVOTHYROXINE SODIUM 88 MCG: 88 TABLET ORAL at 07:55

## 2017-03-28 RX ADMIN — AMLODIPINE BESYLATE 10 MG: 10 TABLET ORAL at 08:40

## 2017-03-28 RX ADMIN — Medication 10 ML: at 08:42

## 2017-03-28 RX ADMIN — FUROSEMIDE 40 MG: 10 INJECTION, SOLUTION INTRAMUSCULAR; INTRAVENOUS at 18:30

## 2017-03-28 RX ADMIN — INSULIN LISPRO 1 UNITS: 100 INJECTION, SOLUTION INTRAVENOUS; SUBCUTANEOUS at 20:40

## 2017-03-28 RX ADMIN — GABAPENTIN 100 MG: 100 CAPSULE ORAL at 08:40

## 2017-03-28 RX ADMIN — Medication 10 ML: at 18:31

## 2017-03-28 RX ADMIN — IPRATROPIUM BROMIDE AND ALBUTEROL SULFATE 1 AMPULE: .5; 3 SOLUTION RESPIRATORY (INHALATION) at 22:05

## 2017-03-28 RX ADMIN — FLUTICASONE PROPIONATE 1 SPRAY: 50 SPRAY, METERED NASAL at 08:41

## 2017-03-28 RX ADMIN — ACETAMINOPHEN 650 MG: 325 TABLET, FILM COATED ORAL at 05:44

## 2017-03-28 RX ADMIN — AZITHROMYCIN MONOHYDRATE 500 MG: 500 INJECTION, POWDER, LYOPHILIZED, FOR SOLUTION INTRAVENOUS at 05:41

## 2017-03-28 RX ADMIN — CEFTRIAXONE 1 G: 1 INJECTION, POWDER, FOR SOLUTION INTRAMUSCULAR; INTRAVENOUS at 05:09

## 2017-03-28 RX ADMIN — IPRATROPIUM BROMIDE AND ALBUTEROL SULFATE 1 AMPULE: .5; 3 SOLUTION RESPIRATORY (INHALATION) at 15:30

## 2017-03-28 RX ADMIN — INSULIN LISPRO 1 UNITS: 100 INJECTION, SOLUTION INTRAVENOUS; SUBCUTANEOUS at 12:20

## 2017-03-28 RX ADMIN — Medication 10 ML: at 20:43

## 2017-03-28 RX ADMIN — IPRATROPIUM BROMIDE AND ALBUTEROL SULFATE 1 AMPULE: .5; 3 SOLUTION RESPIRATORY (INHALATION) at 11:39

## 2017-03-28 RX ADMIN — GABAPENTIN 100 MG: 100 CAPSULE ORAL at 20:43

## 2017-03-28 ASSESSMENT — PAIN SCALES - GENERAL
PAINLEVEL_OUTOF10: 0
PAINLEVEL_OUTOF10: 0
PAINLEVEL_OUTOF10: 3

## 2017-03-29 ENCOUNTER — APPOINTMENT (OUTPATIENT)
Dept: GENERAL RADIOLOGY | Age: 82
DRG: 194 | End: 2017-03-29
Payer: MEDICARE

## 2017-03-29 LAB
ABSOLUTE EOS #: 0.3 K/UL (ref 0–0.4)
ABSOLUTE LYMPH #: 1 K/UL (ref 1–4.8)
ABSOLUTE MONO #: 0.3 K/UL (ref 0–1)
ALBUMIN SERPL-MCNC: 3.5 G/DL (ref 3.5–5.2)
ALBUMIN/GLOBULIN RATIO: 1.1 (ref 1–2.5)
ALP BLD-CCNC: 78 U/L (ref 35–104)
ALT SERPL-CCNC: 12 U/L (ref 5–33)
ANION GAP SERPL CALCULATED.3IONS-SCNC: 16 MMOL/L (ref 9–17)
AST SERPL-CCNC: 14 U/L
BASOPHILS # BLD: 0 % (ref 0–2)
BASOPHILS ABSOLUTE: 0 K/UL (ref 0–0.2)
BILIRUB SERPL-MCNC: 0.2 MG/DL (ref 0.3–1.2)
BUN BLDV-MCNC: 55 MG/DL (ref 8–23)
BUN/CREAT BLD: 28 (ref 9–20)
CALCIUM SERPL-MCNC: 8.3 MG/DL (ref 8.6–10.4)
CHLORIDE BLD-SCNC: 98 MMOL/L (ref 98–107)
CO2: 25 MMOL/L (ref 20–31)
CREAT SERPL-MCNC: 2 MG/DL (ref 0.5–0.9)
DIFFERENTIAL TYPE: ABNORMAL
EOSINOPHILS RELATIVE PERCENT: 4 % (ref 0–8)
GFR AFRICAN AMERICAN: 28 ML/MIN
GFR NON-AFRICAN AMERICAN: 23 ML/MIN
GFR SERPL CREATININE-BSD FRML MDRD: ABNORMAL ML/MIN/{1.73_M2}
GFR SERPL CREATININE-BSD FRML MDRD: ABNORMAL ML/MIN/{1.73_M2}
GLUCOSE BLD-MCNC: 205 MG/DL (ref 70–99)
GLUCOSE BLD-MCNC: 205 MG/DL (ref 74–100)
GLUCOSE BLD-MCNC: 211 MG/DL (ref 74–100)
GLUCOSE BLD-MCNC: 211 MG/DL (ref 74–100)
GLUCOSE BLD-MCNC: 241 MG/DL (ref 74–100)
HCT VFR BLD CALC: 26.2 % (ref 36–46)
HEMOGLOBIN: 8.5 G/DL (ref 12–16)
LYMPHOCYTES # BLD: 14 % (ref 24–44)
MCH RBC QN AUTO: 26.9 PG (ref 26–34)
MCHC RBC AUTO-ENTMCNC: 32.3 G/DL (ref 31–37)
MCV RBC AUTO: 83.1 FL (ref 80–100)
MONOCYTES # BLD: 4 % (ref 0–12)
PDW BLD-RTO: 17.3 % (ref 12.1–15.2)
PLATELET # BLD: 261 K/UL (ref 140–450)
PLATELET ESTIMATE: ABNORMAL
PMV BLD AUTO: ABNORMAL FL (ref 6–12)
POTASSIUM SERPL-SCNC: 4.2 MMOL/L (ref 3.7–5.3)
RBC # BLD: 3.15 M/UL (ref 4–5.2)
RBC # BLD: ABNORMAL 10*6/UL
SEG NEUTROPHILS: 78 % (ref 36–66)
SEGMENTED NEUTROPHILS ABSOLUTE COUNT: 5.5 K/UL (ref 1.8–7.7)
SODIUM BLD-SCNC: 139 MMOL/L (ref 135–144)
TOTAL PROTEIN: 6.8 G/DL (ref 6.4–8.3)
WBC # BLD: 7.1 K/UL (ref 3.5–11)
WBC # BLD: ABNORMAL 10*3/UL

## 2017-03-29 PROCEDURE — 6370000000 HC RX 637 (ALT 250 FOR IP): Performed by: INTERNAL MEDICINE

## 2017-03-29 PROCEDURE — 94664 DEMO&/EVAL PT USE INHALER: CPT

## 2017-03-29 PROCEDURE — 71020 XR CHEST STANDARD TWO VW: CPT

## 2017-03-29 PROCEDURE — 94668 MNPJ CHEST WALL SBSQ: CPT

## 2017-03-29 PROCEDURE — 85025 COMPLETE CBC W/AUTO DIFF WBC: CPT

## 2017-03-29 PROCEDURE — 82947 ASSAY GLUCOSE BLOOD QUANT: CPT

## 2017-03-29 PROCEDURE — 2580000003 HC RX 258: Performed by: FAMILY MEDICINE

## 2017-03-29 PROCEDURE — 97110 THERAPEUTIC EXERCISES: CPT

## 2017-03-29 PROCEDURE — 1200000000 HC SEMI PRIVATE

## 2017-03-29 PROCEDURE — 6360000002 HC RX W HCPCS: Performed by: INTERNAL MEDICINE

## 2017-03-29 PROCEDURE — 6360000002 HC RX W HCPCS: Performed by: FAMILY MEDICINE

## 2017-03-29 PROCEDURE — 94760 N-INVAS EAR/PLS OXIMETRY 1: CPT

## 2017-03-29 PROCEDURE — 80053 COMPREHEN METABOLIC PANEL: CPT

## 2017-03-29 PROCEDURE — 6370000000 HC RX 637 (ALT 250 FOR IP): Performed by: FAMILY MEDICINE

## 2017-03-29 PROCEDURE — 97116 GAIT TRAINING THERAPY: CPT

## 2017-03-29 PROCEDURE — 6370000000 HC RX 637 (ALT 250 FOR IP): Performed by: PHYSICIAN ASSISTANT

## 2017-03-29 PROCEDURE — 36415 COLL VENOUS BLD VENIPUNCTURE: CPT

## 2017-03-29 PROCEDURE — 94640 AIRWAY INHALATION TREATMENT: CPT

## 2017-03-29 PROCEDURE — 94667 MNPJ CHEST WALL 1ST: CPT

## 2017-03-29 RX ORDER — AMLODIPINE BESYLATE 5 MG/1
5 TABLET ORAL DAILY
Status: DISCONTINUED | OUTPATIENT
Start: 2017-03-29 | End: 2017-03-31 | Stop reason: HOSPADM

## 2017-03-29 RX ORDER — FUROSEMIDE 10 MG/ML
40 INJECTION INTRAMUSCULAR; INTRAVENOUS DAILY
Status: DISCONTINUED | OUTPATIENT
Start: 2017-03-29 | End: 2017-03-30

## 2017-03-29 RX ORDER — ATORVASTATIN CALCIUM 20 MG/1
20 TABLET, FILM COATED ORAL NIGHTLY
Status: DISCONTINUED | OUTPATIENT
Start: 2017-03-29 | End: 2017-03-31 | Stop reason: HOSPADM

## 2017-03-29 RX ADMIN — AZITHROMYCIN MONOHYDRATE 500 MG: 500 INJECTION, POWDER, LYOPHILIZED, FOR SOLUTION INTRAVENOUS at 05:36

## 2017-03-29 RX ADMIN — CLOPIDOGREL BISULFATE 75 MG: 75 TABLET ORAL at 08:27

## 2017-03-29 RX ADMIN — INSULIN LISPRO 2 UNITS: 100 INJECTION, SOLUTION INTRAVENOUS; SUBCUTANEOUS at 16:30

## 2017-03-29 RX ADMIN — GABAPENTIN 100 MG: 100 CAPSULE ORAL at 20:02

## 2017-03-29 RX ADMIN — ALLOPURINOL 200 MG: 100 TABLET ORAL at 08:27

## 2017-03-29 RX ADMIN — CEFTRIAXONE 1 G: 1 INJECTION, POWDER, FOR SOLUTION INTRAMUSCULAR; INTRAVENOUS at 05:05

## 2017-03-29 RX ADMIN — FUROSEMIDE 40 MG: 10 INJECTION, SOLUTION INTRAMUSCULAR; INTRAVENOUS at 08:28

## 2017-03-29 RX ADMIN — IPRATROPIUM BROMIDE AND ALBUTEROL SULFATE 1 AMPULE: .5; 3 SOLUTION RESPIRATORY (INHALATION) at 21:24

## 2017-03-29 RX ADMIN — IPRATROPIUM BROMIDE AND ALBUTEROL SULFATE 1 AMPULE: .5; 3 SOLUTION RESPIRATORY (INHALATION) at 06:22

## 2017-03-29 RX ADMIN — IPRATROPIUM BROMIDE AND ALBUTEROL SULFATE 1 AMPULE: .5; 3 SOLUTION RESPIRATORY (INHALATION) at 11:37

## 2017-03-29 RX ADMIN — AMLODIPINE BESYLATE 5 MG: 5 TABLET ORAL at 08:27

## 2017-03-29 RX ADMIN — MECLIZINE HYDROCHLORIDE 25 MG: 25 TABLET ORAL at 20:02

## 2017-03-29 RX ADMIN — MECLIZINE HYDROCHLORIDE 25 MG: 25 TABLET ORAL at 08:38

## 2017-03-29 RX ADMIN — LEVOTHYROXINE SODIUM 88 MCG: 88 TABLET ORAL at 08:28

## 2017-03-29 RX ADMIN — FLUTICASONE PROPIONATE 1 SPRAY: 50 SPRAY, METERED NASAL at 08:30

## 2017-03-29 RX ADMIN — ACETAMINOPHEN 650 MG: 325 TABLET, FILM COATED ORAL at 20:02

## 2017-03-29 RX ADMIN — Medication 10 ML: at 20:08

## 2017-03-29 RX ADMIN — GABAPENTIN 100 MG: 100 CAPSULE ORAL at 08:27

## 2017-03-29 RX ADMIN — Medication 10 ML: at 08:29

## 2017-03-29 RX ADMIN — INSULIN LISPRO 2 UNITS: 100 INJECTION, SOLUTION INTRAVENOUS; SUBCUTANEOUS at 08:31

## 2017-03-29 RX ADMIN — INSULIN LISPRO 1 UNITS: 100 INJECTION, SOLUTION INTRAVENOUS; SUBCUTANEOUS at 20:07

## 2017-03-29 RX ADMIN — INSULIN LISPRO 2 UNITS: 100 INJECTION, SOLUTION INTRAVENOUS; SUBCUTANEOUS at 11:30

## 2017-03-29 RX ADMIN — GABAPENTIN 100 MG: 100 CAPSULE ORAL at 14:39

## 2017-03-29 RX ADMIN — FERROUS SULFATE TAB 325 MG (65 MG ELEMENTAL FE) 325 MG: 325 (65 FE) TAB at 08:27

## 2017-03-29 RX ADMIN — ENOXAPARIN SODIUM 30 MG: 30 INJECTION SUBCUTANEOUS at 08:28

## 2017-03-29 RX ADMIN — IPRATROPIUM BROMIDE AND ALBUTEROL SULFATE 1 AMPULE: .5; 3 SOLUTION RESPIRATORY (INHALATION) at 15:49

## 2017-03-29 RX ADMIN — ATORVASTATIN CALCIUM 20 MG: 20 TABLET, FILM COATED ORAL at 20:02

## 2017-03-29 ASSESSMENT — PAIN SCALES - GENERAL
PAINLEVEL_OUTOF10: 0

## 2017-03-30 LAB
CULTURE: NORMAL
GLUCOSE BLD-MCNC: 174 MG/DL (ref 74–100)
GLUCOSE BLD-MCNC: 187 MG/DL (ref 74–100)
GLUCOSE BLD-MCNC: 192 MG/DL (ref 74–100)
GLUCOSE BLD-MCNC: 203 MG/DL (ref 74–100)
Lab: NORMAL
Lab: NORMAL
SPECIMEN DESCRIPTION: NORMAL
SPECIMEN DESCRIPTION: NORMAL
STATUS: NORMAL
STATUS: NORMAL

## 2017-03-30 PROCEDURE — 6370000000 HC RX 637 (ALT 250 FOR IP): Performed by: PHYSICIAN ASSISTANT

## 2017-03-30 PROCEDURE — 2580000003 HC RX 258: Performed by: FAMILY MEDICINE

## 2017-03-30 PROCEDURE — 97116 GAIT TRAINING THERAPY: CPT

## 2017-03-30 PROCEDURE — 1200000000 HC SEMI PRIVATE

## 2017-03-30 PROCEDURE — 94668 MNPJ CHEST WALL SBSQ: CPT

## 2017-03-30 PROCEDURE — 97110 THERAPEUTIC EXERCISES: CPT

## 2017-03-30 PROCEDURE — 94664 DEMO&/EVAL PT USE INHALER: CPT

## 2017-03-30 PROCEDURE — 6370000000 HC RX 637 (ALT 250 FOR IP): Performed by: INTERNAL MEDICINE

## 2017-03-30 PROCEDURE — 6360000002 HC RX W HCPCS: Performed by: FAMILY MEDICINE

## 2017-03-30 PROCEDURE — 6370000000 HC RX 637 (ALT 250 FOR IP): Performed by: FAMILY MEDICINE

## 2017-03-30 PROCEDURE — 94640 AIRWAY INHALATION TREATMENT: CPT

## 2017-03-30 PROCEDURE — 82947 ASSAY GLUCOSE BLOOD QUANT: CPT

## 2017-03-30 RX ORDER — FUROSEMIDE 10 MG/ML
40 INJECTION INTRAMUSCULAR; INTRAVENOUS 2 TIMES DAILY
Status: DISCONTINUED | OUTPATIENT
Start: 2017-03-30 | End: 2017-03-31 | Stop reason: HOSPADM

## 2017-03-30 RX ADMIN — IPRATROPIUM BROMIDE AND ALBUTEROL SULFATE 1 AMPULE: .5; 3 SOLUTION RESPIRATORY (INHALATION) at 05:38

## 2017-03-30 RX ADMIN — LEVOTHYROXINE SODIUM 88 MCG: 88 TABLET ORAL at 06:41

## 2017-03-30 RX ADMIN — CEFTRIAXONE 1 G: 1 INJECTION, POWDER, FOR SOLUTION INTRAMUSCULAR; INTRAVENOUS at 04:53

## 2017-03-30 RX ADMIN — IPRATROPIUM BROMIDE AND ALBUTEROL SULFATE 1 AMPULE: .5; 3 SOLUTION RESPIRATORY (INHALATION) at 20:33

## 2017-03-30 RX ADMIN — CLOPIDOGREL BISULFATE 75 MG: 75 TABLET ORAL at 08:16

## 2017-03-30 RX ADMIN — INSULIN LISPRO 1 UNITS: 100 INJECTION, SOLUTION INTRAVENOUS; SUBCUTANEOUS at 16:58

## 2017-03-30 RX ADMIN — ALLOPURINOL 200 MG: 100 TABLET ORAL at 08:16

## 2017-03-30 RX ADMIN — Medication 10 ML: at 21:04

## 2017-03-30 RX ADMIN — GABAPENTIN 100 MG: 100 CAPSULE ORAL at 08:16

## 2017-03-30 RX ADMIN — ACETAMINOPHEN 650 MG: 325 TABLET, FILM COATED ORAL at 21:03

## 2017-03-30 RX ADMIN — IPRATROPIUM BROMIDE AND ALBUTEROL SULFATE 1 AMPULE: .5; 3 SOLUTION RESPIRATORY (INHALATION) at 15:42

## 2017-03-30 RX ADMIN — IPRATROPIUM BROMIDE AND ALBUTEROL SULFATE 1 AMPULE: .5; 3 SOLUTION RESPIRATORY (INHALATION) at 11:22

## 2017-03-30 RX ADMIN — MECLIZINE HYDROCHLORIDE 25 MG: 25 TABLET ORAL at 21:03

## 2017-03-30 RX ADMIN — FUROSEMIDE 40 MG: 10 INJECTION, SOLUTION INTRAMUSCULAR; INTRAVENOUS at 08:16

## 2017-03-30 RX ADMIN — FUROSEMIDE 40 MG: 10 INJECTION, SOLUTION INTRAMUSCULAR; INTRAVENOUS at 16:57

## 2017-03-30 RX ADMIN — AMLODIPINE BESYLATE 5 MG: 5 TABLET ORAL at 08:16

## 2017-03-30 RX ADMIN — GABAPENTIN 100 MG: 100 CAPSULE ORAL at 14:07

## 2017-03-30 RX ADMIN — INSULIN LISPRO 1 UNITS: 100 INJECTION, SOLUTION INTRAVENOUS; SUBCUTANEOUS at 11:47

## 2017-03-30 RX ADMIN — INSULIN LISPRO 1 UNITS: 100 INJECTION, SOLUTION INTRAVENOUS; SUBCUTANEOUS at 08:19

## 2017-03-30 RX ADMIN — Medication 10 ML: at 08:16

## 2017-03-30 RX ADMIN — GABAPENTIN 100 MG: 100 CAPSULE ORAL at 21:03

## 2017-03-30 RX ADMIN — ENOXAPARIN SODIUM 30 MG: 30 INJECTION SUBCUTANEOUS at 08:16

## 2017-03-30 RX ADMIN — AZITHROMYCIN MONOHYDRATE 500 MG: 500 INJECTION, POWDER, LYOPHILIZED, FOR SOLUTION INTRAVENOUS at 05:31

## 2017-03-30 RX ADMIN — ATORVASTATIN CALCIUM 20 MG: 20 TABLET, FILM COATED ORAL at 21:03

## 2017-03-30 RX ADMIN — INSULIN LISPRO 1 UNITS: 100 INJECTION, SOLUTION INTRAVENOUS; SUBCUTANEOUS at 21:06

## 2017-03-30 RX ADMIN — FERROUS SULFATE TAB 325 MG (65 MG ELEMENTAL FE) 325 MG: 325 (65 FE) TAB at 08:16

## 2017-03-30 ASSESSMENT — PAIN SCALES - GENERAL: PAINLEVEL_OUTOF10: 0

## 2017-03-31 VITALS
DIASTOLIC BLOOD PRESSURE: 61 MMHG | BODY MASS INDEX: 32.87 KG/M2 | HEART RATE: 70 BPM | RESPIRATION RATE: 20 BRPM | SYSTOLIC BLOOD PRESSURE: 147 MMHG | WEIGHT: 174.1 LBS | HEIGHT: 61 IN | TEMPERATURE: 98.8 F | OXYGEN SATURATION: 91 %

## 2017-03-31 LAB
GLUCOSE BLD-MCNC: 146 MG/DL (ref 74–100)
GLUCOSE BLD-MCNC: 227 MG/DL (ref 74–100)

## 2017-03-31 PROCEDURE — 6360000002 HC RX W HCPCS: Performed by: FAMILY MEDICINE

## 2017-03-31 PROCEDURE — 97110 THERAPEUTIC EXERCISES: CPT

## 2017-03-31 PROCEDURE — 6370000000 HC RX 637 (ALT 250 FOR IP): Performed by: INTERNAL MEDICINE

## 2017-03-31 PROCEDURE — 6370000000 HC RX 637 (ALT 250 FOR IP): Performed by: FAMILY MEDICINE

## 2017-03-31 PROCEDURE — 2580000003 HC RX 258: Performed by: FAMILY MEDICINE

## 2017-03-31 PROCEDURE — 97530 THERAPEUTIC ACTIVITIES: CPT

## 2017-03-31 PROCEDURE — 82947 ASSAY GLUCOSE BLOOD QUANT: CPT

## 2017-03-31 PROCEDURE — 97116 GAIT TRAINING THERAPY: CPT

## 2017-03-31 PROCEDURE — 94664 DEMO&/EVAL PT USE INHALER: CPT

## 2017-03-31 PROCEDURE — 94640 AIRWAY INHALATION TREATMENT: CPT

## 2017-03-31 RX ORDER — FUROSEMIDE 40 MG/1
40 TABLET ORAL DAILY
Qty: 60 TABLET | Refills: 3 | DISCHARGE
Start: 2017-03-31 | End: 2017-06-12 | Stop reason: SDUPTHER

## 2017-03-31 RX ORDER — AMLODIPINE BESYLATE 10 MG/1
5 TABLET ORAL DAILY
Qty: 30 TABLET | Refills: 5 | DISCHARGE
Start: 2017-03-31 | End: 2017-06-12 | Stop reason: SDUPTHER

## 2017-03-31 RX ORDER — IPRATROPIUM BROMIDE AND ALBUTEROL SULFATE 2.5; .5 MG/3ML; MG/3ML
3 SOLUTION RESPIRATORY (INHALATION) 4 TIMES DAILY
Qty: 360 ML | Status: ON HOLD | DISCHARGE
Start: 2017-03-31 | End: 2017-11-22 | Stop reason: ALTCHOICE

## 2017-03-31 RX ADMIN — GABAPENTIN 100 MG: 100 CAPSULE ORAL at 08:09

## 2017-03-31 RX ADMIN — ENOXAPARIN SODIUM 30 MG: 30 INJECTION SUBCUTANEOUS at 08:10

## 2017-03-31 RX ADMIN — AMLODIPINE BESYLATE 5 MG: 5 TABLET ORAL at 08:09

## 2017-03-31 RX ADMIN — ALLOPURINOL 200 MG: 100 TABLET ORAL at 08:09

## 2017-03-31 RX ADMIN — CLOPIDOGREL BISULFATE 75 MG: 75 TABLET ORAL at 08:09

## 2017-03-31 RX ADMIN — IPRATROPIUM BROMIDE AND ALBUTEROL SULFATE 1 AMPULE: .5; 3 SOLUTION RESPIRATORY (INHALATION) at 05:52

## 2017-03-31 RX ADMIN — CEFTRIAXONE 1 G: 1 INJECTION, POWDER, FOR SOLUTION INTRAMUSCULAR; INTRAVENOUS at 05:46

## 2017-03-31 RX ADMIN — FLUTICASONE PROPIONATE 1 SPRAY: 50 SPRAY, METERED NASAL at 08:12

## 2017-03-31 RX ADMIN — INSULIN LISPRO 2 UNITS: 100 INJECTION, SOLUTION INTRAVENOUS; SUBCUTANEOUS at 11:32

## 2017-03-31 RX ADMIN — LEVOTHYROXINE SODIUM 88 MCG: 88 TABLET ORAL at 06:30

## 2017-03-31 RX ADMIN — FERROUS SULFATE TAB 325 MG (65 MG ELEMENTAL FE) 325 MG: 325 (65 FE) TAB at 08:10

## 2017-03-31 ASSESSMENT — PAIN SCALES - GENERAL
PAINLEVEL_OUTOF10: 0
PAINLEVEL_OUTOF10: 0

## 2017-04-03 ENCOUNTER — HOSPITAL ENCOUNTER (OUTPATIENT)
Age: 82
Setting detail: SPECIMEN
Discharge: HOME | End: 2017-04-03
Payer: MEDICARE

## 2017-04-03 PROBLEM — D64.9 ANEMIA, UNSPECIFIED: Status: ACTIVE | Noted: 2017-04-03

## 2017-04-03 PROBLEM — R53.81 DEBILITY: Status: ACTIVE | Noted: 2017-04-03

## 2017-04-03 LAB
ABSOLUTE EOS #: 0.3 K/UL (ref 0–0.4)
ABSOLUTE LYMPH #: 1.1 K/UL (ref 1–4.8)
ABSOLUTE MONO #: 0.3 K/UL (ref 0–1)
ALBUMIN SERPL-MCNC: 2.8 G/DL (ref 3.5–5.2)
ALBUMIN/GLOBULIN RATIO: 0.8 (ref 1–2.5)
ALP BLD-CCNC: 68 U/L (ref 35–104)
ALT SERPL-CCNC: 12 U/L (ref 5–33)
ANION GAP SERPL CALCULATED.3IONS-SCNC: 12 MMOL/L (ref 9–17)
AST SERPL-CCNC: 18 U/L
BASOPHILS # BLD: 0 % (ref 0–2)
BASOPHILS ABSOLUTE: 0 K/UL (ref 0–0.2)
BILIRUB SERPL-MCNC: 0.18 MG/DL (ref 0.3–1.2)
BUN BLDV-MCNC: 27 MG/DL (ref 8–23)
BUN/CREAT BLD: 17 (ref 9–20)
CALCIUM SERPL-MCNC: 8.3 MG/DL (ref 8.6–10.4)
CHLORIDE BLD-SCNC: 101 MMOL/L (ref 98–107)
CO2: 29 MMOL/L (ref 20–31)
CREAT SERPL-MCNC: 1.55 MG/DL (ref 0.5–0.9)
DIFFERENTIAL TYPE: ABNORMAL
EOSINOPHILS RELATIVE PERCENT: 4 % (ref 0–8)
GFR AFRICAN AMERICAN: 38 ML/MIN
GFR NON-AFRICAN AMERICAN: 31 ML/MIN
GFR SERPL CREATININE-BSD FRML MDRD: ABNORMAL ML/MIN/{1.73_M2}
GFR SERPL CREATININE-BSD FRML MDRD: ABNORMAL ML/MIN/{1.73_M2}
GLUCOSE BLD-MCNC: 129 MG/DL (ref 70–99)
HCT VFR BLD CALC: 25.8 % (ref 36–46)
HEMOGLOBIN: 8.3 G/DL (ref 12–16)
LYMPHOCYTES # BLD: 16 % (ref 24–44)
MCH RBC QN AUTO: 26.7 PG (ref 26–34)
MCHC RBC AUTO-ENTMCNC: 32 G/DL (ref 31–37)
MCV RBC AUTO: 83.4 FL (ref 80–100)
MONOCYTES # BLD: 5 % (ref 0–12)
PDW BLD-RTO: 17 % (ref 12.1–15.2)
PLATELET # BLD: 239 K/UL (ref 140–450)
PLATELET ESTIMATE: ABNORMAL
PMV BLD AUTO: ABNORMAL FL (ref 6–12)
POTASSIUM SERPL-SCNC: 4 MMOL/L (ref 3.7–5.3)
RBC # BLD: 3.1 M/UL (ref 4–5.2)
RBC # BLD: ABNORMAL 10*6/UL
SEG NEUTROPHILS: 75 % (ref 36–66)
SEGMENTED NEUTROPHILS ABSOLUTE COUNT: 4.9 K/UL (ref 1.8–7.7)
SODIUM BLD-SCNC: 142 MMOL/L (ref 135–144)
TOTAL PROTEIN: 6.3 G/DL (ref 6.4–8.3)
WBC # BLD: 6.6 K/UL (ref 3.5–11)
WBC # BLD: ABNORMAL 10*3/UL

## 2017-04-03 PROCEDURE — P9604 ONE-WAY ALLOW PRORATED TRIP: HCPCS

## 2017-04-03 PROCEDURE — 80053 COMPREHEN METABOLIC PANEL: CPT

## 2017-04-03 PROCEDURE — 36415 COLL VENOUS BLD VENIPUNCTURE: CPT

## 2017-04-03 PROCEDURE — 85025 COMPLETE CBC W/AUTO DIFF WBC: CPT

## 2017-04-06 PROBLEM — J44.9 CHRONIC OBSTRUCTIVE PULMONARY DISEASE (HCC): Status: ACTIVE | Noted: 2017-04-06

## 2017-04-10 ENCOUNTER — HOSPITAL ENCOUNTER (OUTPATIENT)
Age: 82
Setting detail: SPECIMEN
Discharge: HOME | End: 2017-04-10
Payer: MEDICARE

## 2017-04-10 LAB
ABSOLUTE EOS #: 0.3 K/UL (ref 0–0.4)
ABSOLUTE LYMPH #: 1.2 K/UL (ref 1–4.8)
ABSOLUTE MONO #: 0.3 K/UL (ref 0–1)
ALBUMIN SERPL-MCNC: 3.1 G/DL (ref 3.5–5.2)
ALBUMIN/GLOBULIN RATIO: 1 (ref 1–2.5)
ALP BLD-CCNC: 64 U/L (ref 35–104)
ALT SERPL-CCNC: 14 U/L (ref 5–33)
ANION GAP SERPL CALCULATED.3IONS-SCNC: 13 MMOL/L (ref 9–17)
AST SERPL-CCNC: 23 U/L
BASOPHILS # BLD: 1 % (ref 0–2)
BASOPHILS ABSOLUTE: 0 K/UL (ref 0–0.2)
BILIRUB SERPL-MCNC: <0.1 MG/DL (ref 0.3–1.2)
BUN BLDV-MCNC: 25 MG/DL (ref 8–23)
BUN/CREAT BLD: 17 (ref 9–20)
CALCIUM SERPL-MCNC: 7.5 MG/DL (ref 8.6–10.4)
CHLORIDE BLD-SCNC: 99 MMOL/L (ref 98–107)
CO2: 30 MMOL/L (ref 20–31)
CREAT SERPL-MCNC: 1.5 MG/DL (ref 0.5–0.9)
DIFFERENTIAL TYPE: ABNORMAL
EOSINOPHILS RELATIVE PERCENT: 5 % (ref 0–8)
GFR AFRICAN AMERICAN: 39 ML/MIN
GFR NON-AFRICAN AMERICAN: 33 ML/MIN
GFR SERPL CREATININE-BSD FRML MDRD: ABNORMAL ML/MIN/{1.73_M2}
GFR SERPL CREATININE-BSD FRML MDRD: ABNORMAL ML/MIN/{1.73_M2}
GLUCOSE BLD-MCNC: 111 MG/DL (ref 70–99)
HCT VFR BLD CALC: 26.8 % (ref 36–46)
HEMOGLOBIN: 8.6 G/DL (ref 12–16)
LYMPHOCYTES # BLD: 18 % (ref 24–44)
MCH RBC QN AUTO: 26.5 PG (ref 26–34)
MCHC RBC AUTO-ENTMCNC: 32 G/DL (ref 31–37)
MCV RBC AUTO: 83.1 FL (ref 80–100)
MONOCYTES # BLD: 4 % (ref 0–12)
PDW BLD-RTO: 17.1 % (ref 12.1–15.2)
PLATELET # BLD: 240 K/UL (ref 140–450)
PLATELET ESTIMATE: ABNORMAL
PMV BLD AUTO: ABNORMAL FL (ref 6–12)
POTASSIUM SERPL-SCNC: 4.9 MMOL/L (ref 3.7–5.3)
RBC # BLD: 3.23 M/UL (ref 4–5.2)
RBC # BLD: ABNORMAL 10*6/UL
SEG NEUTROPHILS: 72 % (ref 36–66)
SEGMENTED NEUTROPHILS ABSOLUTE COUNT: 4.6 K/UL (ref 1.8–7.7)
SODIUM BLD-SCNC: 142 MMOL/L (ref 135–144)
TOTAL PROTEIN: 6.1 G/DL (ref 6.4–8.3)
WBC # BLD: 6.4 K/UL (ref 3.5–11)
WBC # BLD: ABNORMAL 10*3/UL

## 2017-04-10 PROCEDURE — P9604 ONE-WAY ALLOW PRORATED TRIP: HCPCS

## 2017-04-10 PROCEDURE — 36415 COLL VENOUS BLD VENIPUNCTURE: CPT

## 2017-04-10 PROCEDURE — 85025 COMPLETE CBC W/AUTO DIFF WBC: CPT

## 2017-04-10 PROCEDURE — 80053 COMPREHEN METABOLIC PANEL: CPT

## 2017-05-08 ENCOUNTER — HOSPITAL ENCOUNTER (OUTPATIENT)
Age: 82
Discharge: HOME | DRG: 291 | End: 2017-05-08
Payer: MEDICARE

## 2017-05-08 ENCOUNTER — HOSPITAL ENCOUNTER (OUTPATIENT)
Dept: GENERAL RADIOLOGY | Age: 82
Discharge: HOME | DRG: 291 | End: 2017-05-08
Payer: MEDICARE

## 2017-05-08 DIAGNOSIS — R06.09 DOE (DYSPNEA ON EXERTION): ICD-10-CM

## 2017-05-08 DIAGNOSIS — R60.0 LOCALIZED EDEMA: ICD-10-CM

## 2017-05-08 LAB
ABSOLUTE EOS #: 0.2 K/UL (ref 0–0.4)
ABSOLUTE LYMPH #: 0.9 K/UL (ref 1–4.8)
ABSOLUTE MONO #: 0.2 K/UL (ref 0–1)
ALBUMIN SERPL-MCNC: 3.6 G/DL (ref 3.5–5.2)
ALBUMIN/GLOBULIN RATIO: 0.9 (ref 1–2.5)
ALP BLD-CCNC: 93 U/L (ref 35–104)
ALT SERPL-CCNC: 8 U/L (ref 5–33)
ANION GAP SERPL CALCULATED.3IONS-SCNC: 16 MMOL/L (ref 9–17)
AST SERPL-CCNC: 13 U/L
BASOPHILS # BLD: 0 %
BASOPHILS ABSOLUTE: 0 K/UL (ref 0–0.2)
BILIRUB SERPL-MCNC: 0.28 MG/DL (ref 0.3–1.2)
BNP INTERPRETATION: ABNORMAL
BUN BLDV-MCNC: 43 MG/DL (ref 8–23)
BUN/CREAT BLD: 21 (ref 9–20)
CALCIUM SERPL-MCNC: 9.1 MG/DL (ref 8.6–10.4)
CHLORIDE BLD-SCNC: 101 MMOL/L (ref 98–107)
CO2: 24 MMOL/L (ref 20–31)
CREAT SERPL-MCNC: 2.06 MG/DL (ref 0.5–0.9)
DIFFERENTIAL TYPE: ABNORMAL
EOSINOPHILS RELATIVE PERCENT: 3 %
GFR AFRICAN AMERICAN: 27 ML/MIN
GFR NON-AFRICAN AMERICAN: 23 ML/MIN
GFR SERPL CREATININE-BSD FRML MDRD: ABNORMAL ML/MIN/{1.73_M2}
GFR SERPL CREATININE-BSD FRML MDRD: ABNORMAL ML/MIN/{1.73_M2}
GLUCOSE BLD-MCNC: 153 MG/DL (ref 70–99)
HCT VFR BLD CALC: 28.5 % (ref 36–46)
HEMOGLOBIN: 9.1 G/DL (ref 12–16)
LYMPHOCYTES # BLD: 15 %
MCH RBC QN AUTO: 26.3 PG (ref 26–34)
MCHC RBC AUTO-ENTMCNC: 31.9 G/DL (ref 31–37)
MCV RBC AUTO: 82.5 FL (ref 80–100)
MONOCYTES # BLD: 3 %
PDW BLD-RTO: 18 % (ref 12.1–15.2)
PLATELET # BLD: 291 K/UL (ref 140–450)
PLATELET ESTIMATE: ABNORMAL
PMV BLD AUTO: ABNORMAL FL (ref 6–12)
POTASSIUM SERPL-SCNC: 4.5 MMOL/L (ref 3.7–5.3)
PRO-BNP: ABNORMAL PG/ML
RBC # BLD: 3.46 M/UL (ref 4–5.2)
RBC # BLD: ABNORMAL 10*6/UL
SEG NEUTROPHILS: 79 %
SEGMENTED NEUTROPHILS ABSOLUTE COUNT: 5.1 K/UL (ref 1.8–7.7)
SODIUM BLD-SCNC: 141 MMOL/L (ref 135–144)
TOTAL PROTEIN: 7.4 G/DL (ref 6.4–8.3)
WBC # BLD: 6.4 K/UL (ref 3.5–11)
WBC # BLD: ABNORMAL 10*3/UL

## 2017-05-08 PROCEDURE — 36415 COLL VENOUS BLD VENIPUNCTURE: CPT

## 2017-05-08 PROCEDURE — 85025 COMPLETE CBC W/AUTO DIFF WBC: CPT

## 2017-05-08 PROCEDURE — 83880 ASSAY OF NATRIURETIC PEPTIDE: CPT

## 2017-05-08 PROCEDURE — 71020 XR CHEST STANDARD TWO VW: CPT

## 2017-05-08 PROCEDURE — 80053 COMPREHEN METABOLIC PANEL: CPT

## 2017-05-09 ENCOUNTER — HOSPITAL ENCOUNTER (INPATIENT)
Age: 82
LOS: 3 days | Discharge: NURSING, REHAB OR LONG-TERM CARE | DRG: 291 | End: 2017-05-12
Attending: INTERNAL MEDICINE | Admitting: INTERNAL MEDICINE
Payer: MEDICARE

## 2017-05-09 DIAGNOSIS — I50.33 ACUTE ON CHRONIC DIASTOLIC CHF (CONGESTIVE HEART FAILURE), NYHA CLASS 1 (HCC): Primary | ICD-10-CM

## 2017-05-09 LAB
ABSOLUTE EOS #: 0.3 K/UL (ref 0–0.4)
ABSOLUTE LYMPH #: 0.8 K/UL (ref 1–4.8)
ABSOLUTE MONO #: 0.2 K/UL (ref 0–1)
ANION GAP SERPL CALCULATED.3IONS-SCNC: 16 MMOL/L (ref 9–17)
BASOPHILS # BLD: 0 %
BASOPHILS ABSOLUTE: 0 K/UL (ref 0–0.2)
BUN BLDV-MCNC: 45 MG/DL (ref 8–23)
BUN/CREAT BLD: 21 (ref 9–20)
CALCIUM SERPL-MCNC: 8.8 MG/DL (ref 8.6–10.4)
CHLORIDE BLD-SCNC: 102 MMOL/L (ref 98–107)
CO2: 23 MMOL/L (ref 20–31)
CREAT SERPL-MCNC: 2.11 MG/DL (ref 0.5–0.9)
DIFFERENTIAL TYPE: ABNORMAL
EOSINOPHILS RELATIVE PERCENT: 5 %
GFR AFRICAN AMERICAN: 27 ML/MIN
GFR NON-AFRICAN AMERICAN: 22 ML/MIN
GFR SERPL CREATININE-BSD FRML MDRD: ABNORMAL ML/MIN/{1.73_M2}
GFR SERPL CREATININE-BSD FRML MDRD: ABNORMAL ML/MIN/{1.73_M2}
GLUCOSE BLD-MCNC: 145 MG/DL (ref 70–99)
GLUCOSE BLD-MCNC: 148 MG/DL (ref 74–100)
GLUCOSE BLD-MCNC: 193 MG/DL (ref 74–100)
HCT VFR BLD CALC: 26.6 % (ref 36–46)
HEMOGLOBIN: 8.4 G/DL (ref 12–16)
LV EF: 55 %
LVEF MODALITY: NORMAL
LYMPHOCYTES # BLD: 14 %
MCH RBC QN AUTO: 26.2 PG (ref 26–34)
MCHC RBC AUTO-ENTMCNC: 31.6 G/DL (ref 31–37)
MCV RBC AUTO: 82.9 FL (ref 80–100)
MONOCYTES # BLD: 3 %
PDW BLD-RTO: 17.8 % (ref 12.1–15.2)
PLATELET # BLD: 277 K/UL (ref 140–450)
PLATELET ESTIMATE: ABNORMAL
PMV BLD AUTO: ABNORMAL FL (ref 6–12)
POTASSIUM SERPL-SCNC: 4.4 MMOL/L (ref 3.7–5.3)
RBC # BLD: 3.21 M/UL (ref 4–5.2)
RBC # BLD: ABNORMAL 10*6/UL
SEG NEUTROPHILS: 78 %
SEGMENTED NEUTROPHILS ABSOLUTE COUNT: 4.7 K/UL (ref 1.8–7.7)
SODIUM BLD-SCNC: 141 MMOL/L (ref 135–144)
THYROXINE, FREE: 1.37 NG/DL (ref 0.93–1.7)
TROPONIN INTERP: NORMAL
TROPONIN T: <0.03 NG/ML
TSH SERPL DL<=0.05 MIU/L-ACNC: 6.41 MIU/L (ref 0.3–5)
WBC # BLD: 6 K/UL (ref 3.5–11)
WBC # BLD: ABNORMAL 10*3/UL

## 2017-05-09 PROCEDURE — 6370000000 HC RX 637 (ALT 250 FOR IP): Performed by: INTERNAL MEDICINE

## 2017-05-09 PROCEDURE — 84443 ASSAY THYROID STIM HORMONE: CPT

## 2017-05-09 PROCEDURE — 2580000003 HC RX 258: Performed by: INTERNAL MEDICINE

## 2017-05-09 PROCEDURE — 6370000000 HC RX 637 (ALT 250 FOR IP): Performed by: PHYSICIAN ASSISTANT

## 2017-05-09 PROCEDURE — 93005 ELECTROCARDIOGRAM TRACING: CPT

## 2017-05-09 PROCEDURE — 36415 COLL VENOUS BLD VENIPUNCTURE: CPT

## 2017-05-09 PROCEDURE — 6360000002 HC RX W HCPCS: Performed by: INTERNAL MEDICINE

## 2017-05-09 PROCEDURE — 99222 1ST HOSP IP/OBS MODERATE 55: CPT | Performed by: INTERNAL MEDICINE

## 2017-05-09 PROCEDURE — 84439 ASSAY OF FREE THYROXINE: CPT

## 2017-05-09 PROCEDURE — 80048 BASIC METABOLIC PNL TOTAL CA: CPT

## 2017-05-09 PROCEDURE — 93306 TTE W/DOPPLER COMPLETE: CPT

## 2017-05-09 PROCEDURE — 82947 ASSAY GLUCOSE BLOOD QUANT: CPT

## 2017-05-09 PROCEDURE — 2000000000 HC ICU R&B

## 2017-05-09 PROCEDURE — 94640 AIRWAY INHALATION TREATMENT: CPT

## 2017-05-09 PROCEDURE — 85025 COMPLETE CBC W/AUTO DIFF WBC: CPT

## 2017-05-09 PROCEDURE — 84484 ASSAY OF TROPONIN QUANT: CPT

## 2017-05-09 PROCEDURE — 94664 DEMO&/EVAL PT USE INHALER: CPT

## 2017-05-09 PROCEDURE — 94760 N-INVAS EAR/PLS OXIMETRY 1: CPT

## 2017-05-09 PROCEDURE — 51702 INSERT TEMP BLADDER CATH: CPT

## 2017-05-09 RX ORDER — POTASSIUM CHLORIDE 7.45 MG/ML
10 INJECTION INTRAVENOUS PRN
Status: DISCONTINUED | OUTPATIENT
Start: 2017-05-09 | End: 2017-05-12 | Stop reason: HOSPADM

## 2017-05-09 RX ORDER — SODIUM CHLORIDE 0.9 % (FLUSH) 0.9 %
10 SYRINGE (ML) INJECTION PRN
Status: DISCONTINUED | OUTPATIENT
Start: 2017-05-09 | End: 2017-05-12 | Stop reason: HOSPADM

## 2017-05-09 RX ORDER — DEXTROSE MONOHYDRATE 25 G/50ML
12.5 INJECTION, SOLUTION INTRAVENOUS PRN
Status: DISCONTINUED | OUTPATIENT
Start: 2017-05-09 | End: 2017-05-12 | Stop reason: HOSPADM

## 2017-05-09 RX ORDER — CLOPIDOGREL BISULFATE 75 MG/1
75 TABLET ORAL DAILY
Status: DISCONTINUED | OUTPATIENT
Start: 2017-05-09 | End: 2017-05-12 | Stop reason: HOSPADM

## 2017-05-09 RX ORDER — GABAPENTIN 100 MG/1
100 CAPSULE ORAL 3 TIMES DAILY
Status: DISCONTINUED | OUTPATIENT
Start: 2017-05-09 | End: 2017-05-12 | Stop reason: HOSPADM

## 2017-05-09 RX ORDER — ATORVASTATIN CALCIUM 20 MG/1
20 TABLET, FILM COATED ORAL NIGHTLY
Status: DISCONTINUED | OUTPATIENT
Start: 2017-05-09 | End: 2017-05-12 | Stop reason: HOSPADM

## 2017-05-09 RX ORDER — LISINOPRIL 5 MG/1
5 TABLET ORAL DAILY
Status: DISCONTINUED | OUTPATIENT
Start: 2017-05-09 | End: 2017-05-12 | Stop reason: HOSPADM

## 2017-05-09 RX ORDER — ALBUTEROL SULFATE 2.5 MG/3ML
2.5 SOLUTION RESPIRATORY (INHALATION) EVERY 4 HOURS PRN
Status: DISCONTINUED | OUTPATIENT
Start: 2017-05-09 | End: 2017-05-12 | Stop reason: HOSPADM

## 2017-05-09 RX ORDER — LEVOTHYROXINE SODIUM 88 UG/1
88 TABLET ORAL DAILY
Status: DISCONTINUED | OUTPATIENT
Start: 2017-05-09 | End: 2017-05-12 | Stop reason: HOSPADM

## 2017-05-09 RX ORDER — IPRATROPIUM BROMIDE AND ALBUTEROL SULFATE 2.5; .5 MG/3ML; MG/3ML
3 SOLUTION RESPIRATORY (INHALATION) 4 TIMES DAILY
Status: DISCONTINUED | OUTPATIENT
Start: 2017-05-09 | End: 2017-05-09

## 2017-05-09 RX ORDER — FUROSEMIDE 10 MG/ML
40 INJECTION INTRAMUSCULAR; INTRAVENOUS 2 TIMES DAILY
Status: DISCONTINUED | OUTPATIENT
Start: 2017-05-09 | End: 2017-05-12 | Stop reason: HOSPADM

## 2017-05-09 RX ORDER — POTASSIUM CHLORIDE 20 MEQ/1
40 TABLET, EXTENDED RELEASE ORAL PRN
Status: DISCONTINUED | OUTPATIENT
Start: 2017-05-09 | End: 2017-05-12 | Stop reason: HOSPADM

## 2017-05-09 RX ORDER — CHOLECALCIFEROL (VITAMIN D3) 125 MCG
4 CAPSULE ORAL
Status: ON HOLD | COMMUNITY
End: 2019-02-02 | Stop reason: DRUGHIGH

## 2017-05-09 RX ORDER — ACETAMINOPHEN 325 MG/1
650 TABLET ORAL EVERY 4 HOURS PRN
Status: DISCONTINUED | OUTPATIENT
Start: 2017-05-09 | End: 2017-05-12 | Stop reason: HOSPADM

## 2017-05-09 RX ORDER — IPRATROPIUM BROMIDE AND ALBUTEROL SULFATE 2.5; .5 MG/3ML; MG/3ML
3 SOLUTION RESPIRATORY (INHALATION) 3 TIMES DAILY
Status: DISCONTINUED | OUTPATIENT
Start: 2017-05-09 | End: 2017-05-11

## 2017-05-09 RX ORDER — MAGNESIUM SULFATE 1 G/100ML
1 INJECTION INTRAVENOUS PRN
Status: DISCONTINUED | OUTPATIENT
Start: 2017-05-09 | End: 2017-05-12 | Stop reason: HOSPADM

## 2017-05-09 RX ORDER — CHOLECALCIFEROL (VITAMIN D3) 125 MCG
2 CAPSULE ORAL
Status: DISCONTINUED | OUTPATIENT
Start: 2017-05-09 | End: 2017-05-11

## 2017-05-09 RX ORDER — NICOTINE POLACRILEX 4 MG
15 LOZENGE BUCCAL PRN
Status: DISCONTINUED | OUTPATIENT
Start: 2017-05-09 | End: 2017-05-12 | Stop reason: HOSPADM

## 2017-05-09 RX ORDER — ALLOPURINOL 100 MG/1
200 TABLET ORAL DAILY
Status: DISCONTINUED | OUTPATIENT
Start: 2017-05-09 | End: 2017-05-12 | Stop reason: HOSPADM

## 2017-05-09 RX ORDER — POTASSIUM CHLORIDE 20MEQ/15ML
40 LIQUID (ML) ORAL PRN
Status: DISCONTINUED | OUTPATIENT
Start: 2017-05-09 | End: 2017-05-12 | Stop reason: HOSPADM

## 2017-05-09 RX ORDER — AMLODIPINE BESYLATE 5 MG/1
5 TABLET ORAL DAILY
Status: DISCONTINUED | OUTPATIENT
Start: 2017-05-09 | End: 2017-05-12 | Stop reason: HOSPADM

## 2017-05-09 RX ORDER — FAMOTIDINE 20 MG/1
20 TABLET, FILM COATED ORAL DAILY
Status: DISCONTINUED | OUTPATIENT
Start: 2017-05-09 | End: 2017-05-12 | Stop reason: HOSPADM

## 2017-05-09 RX ORDER — SODIUM CHLORIDE 0.9 % (FLUSH) 0.9 %
10 SYRINGE (ML) INJECTION EVERY 12 HOURS SCHEDULED
Status: DISCONTINUED | OUTPATIENT
Start: 2017-05-09 | End: 2017-05-12 | Stop reason: HOSPADM

## 2017-05-09 RX ORDER — ONDANSETRON 2 MG/ML
4 INJECTION INTRAMUSCULAR; INTRAVENOUS EVERY 6 HOURS PRN
Status: DISCONTINUED | OUTPATIENT
Start: 2017-05-09 | End: 2017-05-12 | Stop reason: HOSPADM

## 2017-05-09 RX ORDER — DEXTROSE MONOHYDRATE 50 MG/ML
100 INJECTION, SOLUTION INTRAVENOUS PRN
Status: DISCONTINUED | OUTPATIENT
Start: 2017-05-09 | End: 2017-05-12 | Stop reason: HOSPADM

## 2017-05-09 RX ORDER — FERROUS SULFATE 325(65) MG
325 TABLET ORAL
Status: DISCONTINUED | OUTPATIENT
Start: 2017-05-10 | End: 2017-05-12 | Stop reason: HOSPADM

## 2017-05-09 RX ADMIN — CLOPIDOGREL BISULFATE 75 MG: 75 TABLET ORAL at 12:23

## 2017-05-09 RX ADMIN — Medication 10 ML: at 19:57

## 2017-05-09 RX ADMIN — AMLODIPINE BESYLATE 5 MG: 5 TABLET ORAL at 12:23

## 2017-05-09 RX ADMIN — FUROSEMIDE 40 MG: 10 INJECTION, SOLUTION INTRAMUSCULAR; INTRAVENOUS at 19:57

## 2017-05-09 RX ADMIN — GABAPENTIN 100 MG: 100 CAPSULE ORAL at 19:57

## 2017-05-09 RX ADMIN — ATORVASTATIN CALCIUM 20 MG: 20 TABLET, FILM COATED ORAL at 20:02

## 2017-05-09 RX ADMIN — ACETAMINOPHEN 650 MG: 325 TABLET, FILM COATED ORAL at 19:57

## 2017-05-09 RX ADMIN — ALLOPURINOL 200 MG: 100 TABLET ORAL at 12:23

## 2017-05-09 RX ADMIN — ENOXAPARIN SODIUM 30 MG: 30 INJECTION SUBCUTANEOUS at 12:22

## 2017-05-09 RX ADMIN — FUROSEMIDE 40 MG: 10 INJECTION, SOLUTION INTRAMUSCULAR; INTRAVENOUS at 12:23

## 2017-05-09 RX ADMIN — Medication 6000 UNITS: at 16:47

## 2017-05-09 RX ADMIN — INSULIN LISPRO 1 UNITS: 100 INJECTION, SOLUTION INTRAVENOUS; SUBCUTANEOUS at 20:08

## 2017-05-09 RX ADMIN — FAMOTIDINE 20 MG: 20 TABLET ORAL at 12:23

## 2017-05-09 RX ADMIN — IPRATROPIUM BROMIDE AND ALBUTEROL SULFATE 3 ML: .5; 3 SOLUTION RESPIRATORY (INHALATION) at 11:45

## 2017-05-09 RX ADMIN — LEVOTHYROXINE SODIUM 88 MCG: 88 TABLET ORAL at 12:23

## 2017-05-09 RX ADMIN — Medication 10 ML: at 12:23

## 2017-05-09 RX ADMIN — INSULIN LISPRO 2 UNITS: 100 INJECTION, SOLUTION INTRAVENOUS; SUBCUTANEOUS at 16:46

## 2017-05-09 RX ADMIN — IPRATROPIUM BROMIDE AND ALBUTEROL SULFATE 3 ML: .5; 3 SOLUTION RESPIRATORY (INHALATION) at 20:16

## 2017-05-09 RX ADMIN — LISINOPRIL 5 MG: 5 TABLET ORAL at 12:23

## 2017-05-09 ASSESSMENT — PAIN SCALES - GENERAL: PAINLEVEL_OUTOF10: 2

## 2017-05-10 LAB
ABSOLUTE EOS #: 0.5 K/UL (ref 0–0.4)
ABSOLUTE LYMPH #: 1.4 K/UL (ref 1–4.8)
ABSOLUTE MONO #: 0.2 K/UL (ref 0–1)
ANION GAP SERPL CALCULATED.3IONS-SCNC: 13 MMOL/L (ref 9–17)
BASOPHILS # BLD: 1 %
BASOPHILS ABSOLUTE: 0 K/UL (ref 0–0.2)
BUN BLDV-MCNC: 44 MG/DL (ref 8–23)
BUN/CREAT BLD: 22 (ref 9–20)
CALCIUM SERPL-MCNC: 8.3 MG/DL (ref 8.6–10.4)
CHLORIDE BLD-SCNC: 104 MMOL/L (ref 98–107)
CHOLESTEROL/HDL RATIO: 3.5
CHOLESTEROL: 114 MG/DL
CO2: 25 MMOL/L (ref 20–31)
CREAT SERPL-MCNC: 2.04 MG/DL (ref 0.5–0.9)
DIFFERENTIAL TYPE: ABNORMAL
EKG ATRIAL RATE: 76 BPM
EKG P AXIS: 149 DEGREES
EKG P-R INTERVAL: 150 MS
EKG Q-T INTERVAL: 382 MS
EKG QRS DURATION: 96 MS
EKG QTC CALCULATION (BAZETT): 429 MS
EKG R AXIS: 146 DEGREES
EKG T AXIS: -38 DEGREES
EKG VENTRICULAR RATE: 76 BPM
EOSINOPHILS RELATIVE PERCENT: 9 %
GFR AFRICAN AMERICAN: 28 ML/MIN
GFR NON-AFRICAN AMERICAN: 23 ML/MIN
GFR SERPL CREATININE-BSD FRML MDRD: ABNORMAL ML/MIN/{1.73_M2}
GFR SERPL CREATININE-BSD FRML MDRD: ABNORMAL ML/MIN/{1.73_M2}
GLUCOSE BLD-MCNC: 105 MG/DL (ref 74–100)
GLUCOSE BLD-MCNC: 120 MG/DL (ref 74–100)
GLUCOSE BLD-MCNC: 129 MG/DL (ref 70–99)
GLUCOSE BLD-MCNC: 147 MG/DL (ref 74–100)
GLUCOSE BLD-MCNC: 153 MG/DL (ref 74–100)
HCT VFR BLD CALC: 25.1 % (ref 36–46)
HDLC SERPL-MCNC: 33 MG/DL
HEMOGLOBIN: 8 G/DL (ref 12–16)
LDL CHOLESTEROL: 60 MG/DL (ref 0–130)
LYMPHOCYTES # BLD: 25 %
MAGNESIUM: 2.3 MG/DL (ref 1.6–2.6)
MCH RBC QN AUTO: 26.2 PG (ref 26–34)
MCHC RBC AUTO-ENTMCNC: 31.9 G/DL (ref 31–37)
MCV RBC AUTO: 82.2 FL (ref 80–100)
MONOCYTES # BLD: 4 %
PDW BLD-RTO: 18.2 % (ref 12.1–15.2)
PLATELET # BLD: 254 K/UL (ref 140–450)
PLATELET ESTIMATE: ABNORMAL
PMV BLD AUTO: ABNORMAL FL (ref 6–12)
POTASSIUM SERPL-SCNC: 4.1 MMOL/L (ref 3.7–5.3)
RBC # BLD: 3.05 M/UL (ref 4–5.2)
RBC # BLD: ABNORMAL 10*6/UL
SEG NEUTROPHILS: 61 %
SEGMENTED NEUTROPHILS ABSOLUTE COUNT: 3.3 K/UL (ref 1.8–7.7)
SODIUM BLD-SCNC: 142 MMOL/L (ref 135–144)
TRIGL SERPL-MCNC: 103 MG/DL
VLDLC SERPL CALC-MCNC: ABNORMAL MG/DL (ref 1–30)
WBC # BLD: 5.4 K/UL (ref 3.5–11)
WBC # BLD: ABNORMAL 10*3/UL

## 2017-05-10 PROCEDURE — 82947 ASSAY GLUCOSE BLOOD QUANT: CPT

## 2017-05-10 PROCEDURE — 83735 ASSAY OF MAGNESIUM: CPT

## 2017-05-10 PROCEDURE — 6370000000 HC RX 637 (ALT 250 FOR IP): Performed by: PHYSICIAN ASSISTANT

## 2017-05-10 PROCEDURE — 2580000003 HC RX 258: Performed by: INTERNAL MEDICINE

## 2017-05-10 PROCEDURE — 6370000000 HC RX 637 (ALT 250 FOR IP): Performed by: INTERNAL MEDICINE

## 2017-05-10 PROCEDURE — G8987 SELF CARE CURRENT STATUS: HCPCS

## 2017-05-10 PROCEDURE — 80048 BASIC METABOLIC PNL TOTAL CA: CPT

## 2017-05-10 PROCEDURE — 97162 PT EVAL MOD COMPLEX 30 MIN: CPT

## 2017-05-10 PROCEDURE — 97166 OT EVAL MOD COMPLEX 45 MIN: CPT

## 2017-05-10 PROCEDURE — 97535 SELF CARE MNGMENT TRAINING: CPT

## 2017-05-10 PROCEDURE — 80061 LIPID PANEL: CPT

## 2017-05-10 PROCEDURE — G8979 MOBILITY GOAL STATUS: HCPCS

## 2017-05-10 PROCEDURE — 97110 THERAPEUTIC EXERCISES: CPT

## 2017-05-10 PROCEDURE — 85025 COMPLETE CBC W/AUTO DIFF WBC: CPT

## 2017-05-10 PROCEDURE — G8988 SELF CARE GOAL STATUS: HCPCS

## 2017-05-10 PROCEDURE — G8978 MOBILITY CURRENT STATUS: HCPCS

## 2017-05-10 PROCEDURE — 2000000000 HC ICU R&B

## 2017-05-10 PROCEDURE — 94640 AIRWAY INHALATION TREATMENT: CPT

## 2017-05-10 PROCEDURE — 36415 COLL VENOUS BLD VENIPUNCTURE: CPT

## 2017-05-10 PROCEDURE — 6360000002 HC RX W HCPCS: Performed by: INTERNAL MEDICINE

## 2017-05-10 RX ORDER — MECLIZINE HCL 12.5 MG/1
12.5 TABLET ORAL 3 TIMES DAILY
Status: DISCONTINUED | OUTPATIENT
Start: 2017-05-10 | End: 2017-05-12 | Stop reason: HOSPADM

## 2017-05-10 RX ADMIN — AMLODIPINE BESYLATE 5 MG: 5 TABLET ORAL at 09:01

## 2017-05-10 RX ADMIN — LISINOPRIL 5 MG: 5 TABLET ORAL at 09:01

## 2017-05-10 RX ADMIN — ATORVASTATIN CALCIUM 20 MG: 20 TABLET, FILM COATED ORAL at 20:46

## 2017-05-10 RX ADMIN — FAMOTIDINE 20 MG: 20 TABLET ORAL at 09:01

## 2017-05-10 RX ADMIN — LEVOTHYROXINE SODIUM 88 MCG: 88 TABLET ORAL at 09:00

## 2017-05-10 RX ADMIN — FUROSEMIDE 40 MG: 10 INJECTION, SOLUTION INTRAMUSCULAR; INTRAVENOUS at 09:01

## 2017-05-10 RX ADMIN — Medication 10 ML: at 09:05

## 2017-05-10 RX ADMIN — FUROSEMIDE 40 MG: 10 INJECTION, SOLUTION INTRAMUSCULAR; INTRAVENOUS at 17:41

## 2017-05-10 RX ADMIN — ALLOPURINOL 200 MG: 100 TABLET ORAL at 09:00

## 2017-05-10 RX ADMIN — MECLIZINE HCL 12.5 MG: 12.5 TABLET ORAL at 20:46

## 2017-05-10 RX ADMIN — GABAPENTIN 100 MG: 100 CAPSULE ORAL at 20:46

## 2017-05-10 RX ADMIN — ENOXAPARIN SODIUM 30 MG: 30 INJECTION SUBCUTANEOUS at 09:01

## 2017-05-10 RX ADMIN — Medication 10 ML: at 20:48

## 2017-05-10 RX ADMIN — Medication 325 MG: at 09:00

## 2017-05-10 RX ADMIN — INSULIN LISPRO 1 UNITS: 100 INJECTION, SOLUTION INTRAVENOUS; SUBCUTANEOUS at 20:48

## 2017-05-10 RX ADMIN — IPRATROPIUM BROMIDE AND ALBUTEROL SULFATE 3 ML: .5; 3 SOLUTION RESPIRATORY (INHALATION) at 16:12

## 2017-05-10 RX ADMIN — CLOPIDOGREL BISULFATE 75 MG: 75 TABLET ORAL at 09:00

## 2017-05-10 RX ADMIN — IPRATROPIUM BROMIDE AND ALBUTEROL SULFATE 3 ML: .5; 3 SOLUTION RESPIRATORY (INHALATION) at 09:10

## 2017-05-10 RX ADMIN — IPRATROPIUM BROMIDE AND ALBUTEROL SULFATE 3 ML: .5; 3 SOLUTION RESPIRATORY (INHALATION) at 19:39

## 2017-05-10 RX ADMIN — GABAPENTIN 100 MG: 100 CAPSULE ORAL at 09:01

## 2017-05-10 RX ADMIN — GABAPENTIN 100 MG: 100 CAPSULE ORAL at 14:23

## 2017-05-10 RX ADMIN — INSULIN LISPRO 2 UNITS: 100 INJECTION, SOLUTION INTRAVENOUS; SUBCUTANEOUS at 11:59

## 2017-05-10 RX ADMIN — MECLIZINE HCL 12.5 MG: 12.5 TABLET ORAL at 14:23

## 2017-05-10 ASSESSMENT — PAIN SCALES - GENERAL
PAINLEVEL_OUTOF10: 0

## 2017-05-11 LAB
ABSOLUTE EOS #: 0.66 K/UL (ref 0–0.4)
ABSOLUTE LYMPH #: 1.26 K/UL (ref 1–4.8)
ABSOLUTE MONO #: 0.36 K/UL (ref 0–1)
ANION GAP SERPL CALCULATED.3IONS-SCNC: 13 MMOL/L (ref 9–17)
BASOPHILS # BLD: 0 %
BASOPHILS ABSOLUTE: 0 K/UL (ref 0–0.2)
BUN BLDV-MCNC: 41 MG/DL (ref 8–23)
BUN/CREAT BLD: 23 (ref 9–20)
CALCIUM SERPL-MCNC: 8.3 MG/DL (ref 8.6–10.4)
CHLORIDE BLD-SCNC: 100 MMOL/L (ref 98–107)
CO2: 28 MMOL/L (ref 20–31)
CREAT SERPL-MCNC: 1.78 MG/DL (ref 0.5–0.9)
DIFFERENTIAL TYPE: ABNORMAL
EOSINOPHILS RELATIVE PERCENT: 11 %
GFR AFRICAN AMERICAN: 32 ML/MIN
GFR NON-AFRICAN AMERICAN: 27 ML/MIN
GFR SERPL CREATININE-BSD FRML MDRD: ABNORMAL ML/MIN/{1.73_M2}
GFR SERPL CREATININE-BSD FRML MDRD: ABNORMAL ML/MIN/{1.73_M2}
GLUCOSE BLD-MCNC: 122 MG/DL (ref 74–100)
GLUCOSE BLD-MCNC: 124 MG/DL (ref 74–100)
GLUCOSE BLD-MCNC: 126 MG/DL (ref 74–100)
GLUCOSE BLD-MCNC: 133 MG/DL (ref 70–99)
GLUCOSE BLD-MCNC: 204 MG/DL (ref 74–100)
HCT VFR BLD CALC: 25 % (ref 36–46)
HEMOGLOBIN: 7.9 G/DL (ref 12–16)
LYMPHOCYTES # BLD: 21 %
MCH RBC QN AUTO: 26.1 PG (ref 26–34)
MCHC RBC AUTO-ENTMCNC: 31.5 G/DL (ref 31–37)
MCV RBC AUTO: 82.8 FL (ref 80–100)
MONOCYTES # BLD: 6 %
MORPHOLOGY: ABNORMAL
PDW BLD-RTO: 18.1 % (ref 12.1–15.2)
PLATELET # BLD: 254 K/UL (ref 140–450)
PLATELET ESTIMATE: ABNORMAL
PMV BLD AUTO: ABNORMAL FL (ref 6–12)
POTASSIUM SERPL-SCNC: 4.2 MMOL/L (ref 3.7–5.3)
RBC # BLD: 3.01 M/UL (ref 4–5.2)
RBC # BLD: ABNORMAL 10*6/UL
SEG NEUTROPHILS: 62 %
SEGMENTED NEUTROPHILS ABSOLUTE COUNT: 3.72 K/UL (ref 1.8–7.7)
SODIUM BLD-SCNC: 141 MMOL/L (ref 135–144)
WBC # BLD: 6 K/UL (ref 3.5–11)
WBC # BLD: ABNORMAL 10*3/UL

## 2017-05-11 PROCEDURE — 97530 THERAPEUTIC ACTIVITIES: CPT

## 2017-05-11 PROCEDURE — 6360000002 HC RX W HCPCS: Performed by: INTERNAL MEDICINE

## 2017-05-11 PROCEDURE — 6370000000 HC RX 637 (ALT 250 FOR IP): Performed by: INTERNAL MEDICINE

## 2017-05-11 PROCEDURE — 2580000003 HC RX 258: Performed by: INTERNAL MEDICINE

## 2017-05-11 PROCEDURE — 85025 COMPLETE CBC W/AUTO DIFF WBC: CPT

## 2017-05-11 PROCEDURE — 2000000000 HC ICU R&B

## 2017-05-11 PROCEDURE — 94760 N-INVAS EAR/PLS OXIMETRY 1: CPT

## 2017-05-11 PROCEDURE — 97110 THERAPEUTIC EXERCISES: CPT

## 2017-05-11 PROCEDURE — 36415 COLL VENOUS BLD VENIPUNCTURE: CPT

## 2017-05-11 PROCEDURE — 97116 GAIT TRAINING THERAPY: CPT

## 2017-05-11 PROCEDURE — 94664 DEMO&/EVAL PT USE INHALER: CPT

## 2017-05-11 PROCEDURE — 97535 SELF CARE MNGMENT TRAINING: CPT

## 2017-05-11 PROCEDURE — 80048 BASIC METABOLIC PNL TOTAL CA: CPT

## 2017-05-11 PROCEDURE — 6370000000 HC RX 637 (ALT 250 FOR IP): Performed by: PHYSICIAN ASSISTANT

## 2017-05-11 PROCEDURE — 82947 ASSAY GLUCOSE BLOOD QUANT: CPT

## 2017-05-11 RX ORDER — IPRATROPIUM BROMIDE AND ALBUTEROL SULFATE 2.5; .5 MG/3ML; MG/3ML
3 SOLUTION RESPIRATORY (INHALATION) 3 TIMES DAILY PRN
Status: DISCONTINUED | OUTPATIENT
Start: 2017-05-11 | End: 2017-05-12 | Stop reason: HOSPADM

## 2017-05-11 RX ADMIN — MECLIZINE HCL 12.5 MG: 12.5 TABLET ORAL at 13:58

## 2017-05-11 RX ADMIN — MECLIZINE HCL 12.5 MG: 12.5 TABLET ORAL at 08:23

## 2017-05-11 RX ADMIN — Medication 10 ML: at 20:35

## 2017-05-11 RX ADMIN — FUROSEMIDE 40 MG: 10 INJECTION, SOLUTION INTRAMUSCULAR; INTRAVENOUS at 17:25

## 2017-05-11 RX ADMIN — GABAPENTIN 100 MG: 100 CAPSULE ORAL at 13:58

## 2017-05-11 RX ADMIN — Medication 325 MG: at 08:24

## 2017-05-11 RX ADMIN — GABAPENTIN 100 MG: 100 CAPSULE ORAL at 20:34

## 2017-05-11 RX ADMIN — ATORVASTATIN CALCIUM 20 MG: 20 TABLET, FILM COATED ORAL at 20:34

## 2017-05-11 RX ADMIN — AMLODIPINE BESYLATE 5 MG: 5 TABLET ORAL at 08:24

## 2017-05-11 RX ADMIN — FAMOTIDINE 20 MG: 20 TABLET ORAL at 08:24

## 2017-05-11 RX ADMIN — LEVOTHYROXINE SODIUM 88 MCG: 88 TABLET ORAL at 08:24

## 2017-05-11 RX ADMIN — ALLOPURINOL 200 MG: 100 TABLET ORAL at 08:24

## 2017-05-11 RX ADMIN — ENOXAPARIN SODIUM 30 MG: 30 INJECTION SUBCUTANEOUS at 08:23

## 2017-05-11 RX ADMIN — INSULIN LISPRO 2 UNITS: 100 INJECTION, SOLUTION INTRAVENOUS; SUBCUTANEOUS at 20:30

## 2017-05-11 RX ADMIN — FUROSEMIDE 40 MG: 10 INJECTION, SOLUTION INTRAMUSCULAR; INTRAVENOUS at 08:23

## 2017-05-11 RX ADMIN — CLOPIDOGREL BISULFATE 75 MG: 75 TABLET ORAL at 08:24

## 2017-05-11 RX ADMIN — Medication 10 ML: at 17:25

## 2017-05-11 RX ADMIN — LISINOPRIL 5 MG: 5 TABLET ORAL at 08:24

## 2017-05-11 RX ADMIN — GABAPENTIN 100 MG: 100 CAPSULE ORAL at 08:23

## 2017-05-11 RX ADMIN — MECLIZINE HCL 12.5 MG: 12.5 TABLET ORAL at 20:34

## 2017-05-11 RX ADMIN — Medication 10 ML: at 08:23

## 2017-05-11 ASSESSMENT — PAIN SCALES - GENERAL
PAINLEVEL_OUTOF10: 0

## 2017-05-12 VITALS
TEMPERATURE: 98.7 F | HEIGHT: 61 IN | OXYGEN SATURATION: 95 % | BODY MASS INDEX: 29.15 KG/M2 | WEIGHT: 154.4 LBS | RESPIRATION RATE: 20 BRPM | SYSTOLIC BLOOD PRESSURE: 124 MMHG | HEART RATE: 68 BPM | DIASTOLIC BLOOD PRESSURE: 57 MMHG

## 2017-05-12 LAB
ABSOLUTE EOS #: 0.6 K/UL (ref 0–0.4)
ABSOLUTE LYMPH #: 1.2 K/UL (ref 1–4.8)
ABSOLUTE MONO #: 0.3 K/UL (ref 0–1)
BASOPHILS # BLD: 1 %
BASOPHILS ABSOLUTE: 0 K/UL (ref 0–0.2)
DIFFERENTIAL TYPE: ABNORMAL
EOSINOPHILS RELATIVE PERCENT: 11 %
GLUCOSE BLD-MCNC: 113 MG/DL (ref 74–100)
GLUCOSE BLD-MCNC: 163 MG/DL (ref 74–100)
HCT VFR BLD CALC: 27 % (ref 36–46)
HEMOGLOBIN: 8.4 G/DL (ref 12–16)
LYMPHOCYTES # BLD: 22 %
MCH RBC QN AUTO: 25.9 PG (ref 26–34)
MCHC RBC AUTO-ENTMCNC: 31.2 G/DL (ref 31–37)
MCV RBC AUTO: 83 FL (ref 80–100)
MONOCYTES # BLD: 5 %
PDW BLD-RTO: 17.5 % (ref 12.1–15.2)
PLATELET # BLD: 245 K/UL (ref 140–450)
PLATELET ESTIMATE: ABNORMAL
PMV BLD AUTO: ABNORMAL FL (ref 6–12)
RBC # BLD: 3.26 M/UL (ref 4–5.2)
RBC # BLD: ABNORMAL 10*6/UL
SEG NEUTROPHILS: 61 %
SEGMENTED NEUTROPHILS ABSOLUTE COUNT: 3.4 K/UL (ref 1.8–7.7)
WBC # BLD: 5.5 K/UL (ref 3.5–11)
WBC # BLD: ABNORMAL 10*3/UL

## 2017-05-12 PROCEDURE — 6370000000 HC RX 637 (ALT 250 FOR IP): Performed by: PHYSICIAN ASSISTANT

## 2017-05-12 PROCEDURE — 82947 ASSAY GLUCOSE BLOOD QUANT: CPT

## 2017-05-12 PROCEDURE — 85025 COMPLETE CBC W/AUTO DIFF WBC: CPT

## 2017-05-12 PROCEDURE — 6360000002 HC RX W HCPCS: Performed by: INTERNAL MEDICINE

## 2017-05-12 PROCEDURE — 6370000000 HC RX 637 (ALT 250 FOR IP): Performed by: INTERNAL MEDICINE

## 2017-05-12 PROCEDURE — 2580000003 HC RX 258: Performed by: INTERNAL MEDICINE

## 2017-05-12 PROCEDURE — 36415 COLL VENOUS BLD VENIPUNCTURE: CPT

## 2017-05-12 RX ORDER — FUROSEMIDE 40 MG/1
40 TABLET ORAL DAILY
Status: DISCONTINUED | OUTPATIENT
Start: 2017-05-12 | End: 2017-05-12 | Stop reason: HOSPADM

## 2017-05-12 RX ORDER — POTASSIUM CHLORIDE 20 MEQ/1
20 TABLET, EXTENDED RELEASE ORAL DAILY
Qty: 60 TABLET | Refills: 3 | DISCHARGE
Start: 2017-05-12 | End: 2017-06-12 | Stop reason: SDUPTHER

## 2017-05-12 RX ORDER — GLIMEPIRIDE 1 MG/1
1 TABLET ORAL EVERY MORNING
Qty: 30 TABLET | Refills: 3 | DISCHARGE
Start: 2017-05-12 | End: 2017-07-17 | Stop reason: SDUPTHER

## 2017-05-12 RX ORDER — LISINOPRIL 5 MG/1
5 TABLET ORAL DAILY
Qty: 30 TABLET | Refills: 3 | DISCHARGE
Start: 2017-05-12 | End: 2017-07-12 | Stop reason: SDUPTHER

## 2017-05-12 RX ADMIN — Medication 325 MG: at 08:05

## 2017-05-12 RX ADMIN — GABAPENTIN 100 MG: 100 CAPSULE ORAL at 08:05

## 2017-05-12 RX ADMIN — ALLOPURINOL 200 MG: 100 TABLET ORAL at 08:05

## 2017-05-12 RX ADMIN — FAMOTIDINE 20 MG: 20 TABLET ORAL at 08:05

## 2017-05-12 RX ADMIN — LEVOTHYROXINE SODIUM 88 MCG: 88 TABLET ORAL at 08:05

## 2017-05-12 RX ADMIN — AMLODIPINE BESYLATE 5 MG: 5 TABLET ORAL at 08:05

## 2017-05-12 RX ADMIN — INSULIN LISPRO 2 UNITS: 100 INJECTION, SOLUTION INTRAVENOUS; SUBCUTANEOUS at 11:34

## 2017-05-12 RX ADMIN — CLOPIDOGREL BISULFATE 75 MG: 75 TABLET ORAL at 08:05

## 2017-05-12 RX ADMIN — LISINOPRIL 5 MG: 5 TABLET ORAL at 08:05

## 2017-05-12 RX ADMIN — ENOXAPARIN SODIUM 30 MG: 30 INJECTION SUBCUTANEOUS at 08:05

## 2017-05-12 RX ADMIN — MECLIZINE HCL 12.5 MG: 12.5 TABLET ORAL at 08:05

## 2017-05-12 RX ADMIN — FUROSEMIDE 40 MG: 40 TABLET ORAL at 08:20

## 2017-05-12 ASSESSMENT — PAIN SCALES - GENERAL
PAINLEVEL_OUTOF10: 0
PAINLEVEL_OUTOF10: 0

## 2017-05-15 ENCOUNTER — CARE COORDINATION (OUTPATIENT)
Dept: CARE COORDINATION | Age: 82
End: 2017-05-15

## 2017-06-12 ENCOUNTER — HOSPITAL ENCOUNTER (OUTPATIENT)
Age: 82
Discharge: HOME | End: 2017-06-12
Payer: MEDICARE

## 2017-06-12 DIAGNOSIS — I50.33 ACUTE ON CHRONIC DIASTOLIC CHF (CONGESTIVE HEART FAILURE), NYHA CLASS 1 (HCC): ICD-10-CM

## 2017-06-12 DIAGNOSIS — D64.9 ANEMIA, UNSPECIFIED: ICD-10-CM

## 2017-06-12 LAB
ABSOLUTE EOS #: 0.5 K/UL (ref 0–0.4)
ABSOLUTE LYMPH #: 1.5 K/UL (ref 1–4.8)
ABSOLUTE MONO #: 0.3 K/UL (ref 0–1)
ANION GAP SERPL CALCULATED.3IONS-SCNC: 16 MMOL/L (ref 9–17)
BASOPHILS # BLD: 0 %
BASOPHILS ABSOLUTE: 0 K/UL (ref 0–0.2)
BUN BLDV-MCNC: 40 MG/DL (ref 8–23)
BUN/CREAT BLD: 21 (ref 9–20)
CALCIUM SERPL-MCNC: 9.4 MG/DL (ref 8.6–10.4)
CHLORIDE BLD-SCNC: 97 MMOL/L (ref 98–107)
CO2: 26 MMOL/L (ref 20–31)
CREAT SERPL-MCNC: 1.88 MG/DL (ref 0.5–0.9)
DIFFERENTIAL TYPE: ABNORMAL
EOSINOPHILS RELATIVE PERCENT: 7 %
GFR AFRICAN AMERICAN: 30 ML/MIN
GFR NON-AFRICAN AMERICAN: 25 ML/MIN
GFR SERPL CREATININE-BSD FRML MDRD: ABNORMAL ML/MIN/{1.73_M2}
GFR SERPL CREATININE-BSD FRML MDRD: ABNORMAL ML/MIN/{1.73_M2}
GLUCOSE BLD-MCNC: 153 MG/DL (ref 70–99)
HCT VFR BLD CALC: 30.9 % (ref 36–46)
HEMOGLOBIN: 10.1 G/DL (ref 12–16)
LYMPHOCYTES # BLD: 24 %
MCH RBC QN AUTO: 27 PG (ref 26–34)
MCHC RBC AUTO-ENTMCNC: 32.8 G/DL (ref 31–37)
MCV RBC AUTO: 82.5 FL (ref 80–100)
MONOCYTES # BLD: 5 %
PDW BLD-RTO: 18.4 % (ref 12.1–15.2)
PLATELET # BLD: 240 K/UL (ref 140–450)
PLATELET ESTIMATE: ABNORMAL
PMV BLD AUTO: ABNORMAL FL (ref 6–12)
POTASSIUM SERPL-SCNC: 4.8 MMOL/L (ref 3.7–5.3)
RBC # BLD: 3.75 M/UL (ref 4–5.2)
RBC # BLD: ABNORMAL 10*6/UL
SEG NEUTROPHILS: 64 %
SEGMENTED NEUTROPHILS ABSOLUTE COUNT: 3.9 K/UL (ref 1.8–7.7)
SODIUM BLD-SCNC: 139 MMOL/L (ref 135–144)
WBC # BLD: 6.3 K/UL (ref 3.5–11)
WBC # BLD: ABNORMAL 10*3/UL

## 2017-06-12 PROCEDURE — 80048 BASIC METABOLIC PNL TOTAL CA: CPT

## 2017-06-12 PROCEDURE — 36415 COLL VENOUS BLD VENIPUNCTURE: CPT

## 2017-06-12 PROCEDURE — 85025 COMPLETE CBC W/AUTO DIFF WBC: CPT

## 2017-06-26 ENCOUNTER — HOSPITAL ENCOUNTER (OUTPATIENT)
Age: 82
Discharge: HOME | End: 2017-06-26
Payer: MEDICARE

## 2017-06-26 DIAGNOSIS — I50.33 ACUTE ON CHRONIC DIASTOLIC CHF (CONGESTIVE HEART FAILURE), NYHA CLASS 1 (HCC): ICD-10-CM

## 2017-06-26 LAB
ANION GAP SERPL CALCULATED.3IONS-SCNC: 15 MMOL/L (ref 9–17)
BUN BLDV-MCNC: 41 MG/DL (ref 8–23)
BUN/CREAT BLD: 25 (ref 9–20)
CALCIUM SERPL-MCNC: 9.4 MG/DL (ref 8.6–10.4)
CHLORIDE BLD-SCNC: 103 MMOL/L (ref 98–107)
CO2: 24 MMOL/L (ref 20–31)
CREAT SERPL-MCNC: 1.62 MG/DL (ref 0.5–0.9)
GFR AFRICAN AMERICAN: 36 ML/MIN
GFR NON-AFRICAN AMERICAN: 30 ML/MIN
GFR SERPL CREATININE-BSD FRML MDRD: ABNORMAL ML/MIN/{1.73_M2}
GFR SERPL CREATININE-BSD FRML MDRD: ABNORMAL ML/MIN/{1.73_M2}
GLUCOSE BLD-MCNC: 113 MG/DL (ref 70–99)
POTASSIUM SERPL-SCNC: 6.1 MMOL/L (ref 3.7–5.3)
SODIUM BLD-SCNC: 142 MMOL/L (ref 135–144)

## 2017-06-26 PROCEDURE — 36415 COLL VENOUS BLD VENIPUNCTURE: CPT

## 2017-06-26 PROCEDURE — 80048 BASIC METABOLIC PNL TOTAL CA: CPT

## 2017-06-28 ENCOUNTER — HOSPITAL ENCOUNTER (OUTPATIENT)
Age: 82
Discharge: HOME | End: 2017-06-28
Payer: MEDICARE

## 2017-06-28 DIAGNOSIS — E87.5 HYPERKALEMIA: ICD-10-CM

## 2017-06-28 LAB — POTASSIUM SERPL-SCNC: 5.6 MMOL/L (ref 3.7–5.3)

## 2017-06-28 PROCEDURE — 84132 ASSAY OF SERUM POTASSIUM: CPT

## 2017-06-28 PROCEDURE — 36415 COLL VENOUS BLD VENIPUNCTURE: CPT

## 2017-06-29 ENCOUNTER — HOSPITAL ENCOUNTER (OUTPATIENT)
Age: 82
Discharge: HOME | End: 2017-06-29
Payer: MEDICARE

## 2017-06-29 DIAGNOSIS — E87.5 HYPERKALEMIA: ICD-10-CM

## 2017-06-29 LAB — POTASSIUM SERPL-SCNC: 5.8 MMOL/L (ref 3.7–5.3)

## 2017-06-29 PROCEDURE — 84132 ASSAY OF SERUM POTASSIUM: CPT

## 2017-06-29 PROCEDURE — 36415 COLL VENOUS BLD VENIPUNCTURE: CPT

## 2017-07-03 ENCOUNTER — HOSPITAL ENCOUNTER (OUTPATIENT)
Age: 82
Discharge: HOME | End: 2017-07-03
Payer: MEDICARE

## 2017-07-03 DIAGNOSIS — E87.5 SERUM POTASSIUM ELEVATED: ICD-10-CM

## 2017-07-03 LAB — POTASSIUM SERPL-SCNC: 5.6 MMOL/L (ref 3.7–5.3)

## 2017-07-03 PROCEDURE — 84132 ASSAY OF SERUM POTASSIUM: CPT

## 2017-07-03 PROCEDURE — 36415 COLL VENOUS BLD VENIPUNCTURE: CPT

## 2017-07-07 ENCOUNTER — HOSPITAL ENCOUNTER (OUTPATIENT)
Age: 82
Discharge: HOME | End: 2017-07-07
Payer: MEDICARE

## 2017-07-07 DIAGNOSIS — E87.5 HYPERKALEMIA: ICD-10-CM

## 2017-07-07 LAB
ANION GAP SERPL CALCULATED.3IONS-SCNC: 18 MMOL/L (ref 9–17)
BUN BLDV-MCNC: 68 MG/DL (ref 8–23)
BUN/CREAT BLD: 30 (ref 9–20)
CALCIUM SERPL-MCNC: 8.7 MG/DL (ref 8.6–10.4)
CHLORIDE BLD-SCNC: 98 MMOL/L (ref 98–107)
CO2: 22 MMOL/L (ref 20–31)
CREAT SERPL-MCNC: 2.23 MG/DL (ref 0.5–0.9)
GFR AFRICAN AMERICAN: 25 ML/MIN
GFR NON-AFRICAN AMERICAN: 21 ML/MIN
GFR SERPL CREATININE-BSD FRML MDRD: ABNORMAL ML/MIN/{1.73_M2}
GFR SERPL CREATININE-BSD FRML MDRD: ABNORMAL ML/MIN/{1.73_M2}
GLUCOSE BLD-MCNC: 210 MG/DL (ref 70–99)
POTASSIUM SERPL-SCNC: 4.7 MMOL/L (ref 3.7–5.3)
SODIUM BLD-SCNC: 138 MMOL/L (ref 135–144)

## 2017-07-07 PROCEDURE — 36415 COLL VENOUS BLD VENIPUNCTURE: CPT

## 2017-07-07 PROCEDURE — 80048 BASIC METABOLIC PNL TOTAL CA: CPT

## 2017-07-10 ENCOUNTER — HOSPITAL ENCOUNTER (OUTPATIENT)
Age: 82
Discharge: HOME | End: 2017-07-10
Payer: MEDICARE

## 2017-07-10 DIAGNOSIS — N19 RENAL FAILURE: ICD-10-CM

## 2017-07-10 LAB
ANION GAP SERPL CALCULATED.3IONS-SCNC: 14 MMOL/L (ref 9–17)
BUN BLDV-MCNC: 70 MG/DL (ref 8–23)
BUN/CREAT BLD: 30 (ref 9–20)
CALCIUM SERPL-MCNC: 8.8 MG/DL (ref 8.6–10.4)
CHLORIDE BLD-SCNC: 103 MMOL/L (ref 98–107)
CO2: 25 MMOL/L (ref 20–31)
CREAT SERPL-MCNC: 2.35 MG/DL (ref 0.5–0.9)
GFR AFRICAN AMERICAN: 23 ML/MIN
GFR NON-AFRICAN AMERICAN: 19 ML/MIN
GFR SERPL CREATININE-BSD FRML MDRD: ABNORMAL ML/MIN/{1.73_M2}
GFR SERPL CREATININE-BSD FRML MDRD: ABNORMAL ML/MIN/{1.73_M2}
GLUCOSE BLD-MCNC: 133 MG/DL (ref 70–99)
POTASSIUM SERPL-SCNC: 4.8 MMOL/L (ref 3.7–5.3)
SODIUM BLD-SCNC: 142 MMOL/L (ref 135–144)

## 2017-07-10 PROCEDURE — 36415 COLL VENOUS BLD VENIPUNCTURE: CPT

## 2017-07-10 PROCEDURE — 80048 BASIC METABOLIC PNL TOTAL CA: CPT

## 2017-07-25 ENCOUNTER — HOSPITAL ENCOUNTER (OUTPATIENT)
Dept: LAB | Age: 82
Discharge: HOME | End: 2017-07-25
Payer: MEDICARE

## 2017-07-25 DIAGNOSIS — N19 RENAL FAILURE: ICD-10-CM

## 2017-07-25 LAB
ANION GAP SERPL CALCULATED.3IONS-SCNC: 13 MMOL/L (ref 9–17)
BUN BLDV-MCNC: 52 MG/DL (ref 8–23)
BUN/CREAT BLD: 29 (ref 9–20)
CALCIUM SERPL-MCNC: 9 MG/DL (ref 8.6–10.4)
CHLORIDE BLD-SCNC: 103 MMOL/L (ref 98–107)
CO2: 24 MMOL/L (ref 20–31)
CREAT SERPL-MCNC: 1.77 MG/DL (ref 0.5–0.9)
GFR AFRICAN AMERICAN: 33 ML/MIN
GFR NON-AFRICAN AMERICAN: 27 ML/MIN
GFR SERPL CREATININE-BSD FRML MDRD: ABNORMAL ML/MIN/{1.73_M2}
GFR SERPL CREATININE-BSD FRML MDRD: ABNORMAL ML/MIN/{1.73_M2}
GLUCOSE BLD-MCNC: 157 MG/DL (ref 70–99)
POTASSIUM SERPL-SCNC: 5.6 MMOL/L (ref 3.7–5.3)
SODIUM BLD-SCNC: 140 MMOL/L (ref 135–144)

## 2017-07-25 PROCEDURE — 36415 COLL VENOUS BLD VENIPUNCTURE: CPT

## 2017-07-25 PROCEDURE — 80048 BASIC METABOLIC PNL TOTAL CA: CPT

## 2017-07-31 ENCOUNTER — HOSPITAL ENCOUNTER (OUTPATIENT)
Dept: LAB | Age: 82
Discharge: HOME | End: 2017-07-31
Payer: MEDICARE

## 2017-07-31 DIAGNOSIS — E87.5 HYPERKALEMIA: ICD-10-CM

## 2017-07-31 LAB
ANION GAP SERPL CALCULATED.3IONS-SCNC: 13 MMOL/L (ref 9–17)
BUN BLDV-MCNC: 57 MG/DL (ref 8–23)
BUN/CREAT BLD: 28 (ref 9–20)
CALCIUM SERPL-MCNC: 8.8 MG/DL (ref 8.6–10.4)
CHLORIDE BLD-SCNC: 102 MMOL/L (ref 98–107)
CO2: 22 MMOL/L (ref 20–31)
CREAT SERPL-MCNC: 2.02 MG/DL (ref 0.5–0.9)
GFR AFRICAN AMERICAN: 28 ML/MIN
GFR NON-AFRICAN AMERICAN: 23 ML/MIN
GFR SERPL CREATININE-BSD FRML MDRD: ABNORMAL ML/MIN/{1.73_M2}
GFR SERPL CREATININE-BSD FRML MDRD: ABNORMAL ML/MIN/{1.73_M2}
GLUCOSE BLD-MCNC: 72 MG/DL (ref 70–99)
POTASSIUM SERPL-SCNC: 5.3 MMOL/L (ref 3.7–5.3)
SODIUM BLD-SCNC: 137 MMOL/L (ref 135–144)

## 2017-07-31 PROCEDURE — 80048 BASIC METABOLIC PNL TOTAL CA: CPT

## 2017-07-31 PROCEDURE — 36415 COLL VENOUS BLD VENIPUNCTURE: CPT

## 2017-08-30 ENCOUNTER — HOSPITAL ENCOUNTER (OUTPATIENT)
Age: 82
Discharge: HOME | End: 2017-08-30
Payer: MEDICARE

## 2017-08-30 DIAGNOSIS — E78.5 HYPERLIPIDEMIA, UNSPECIFIED HYPERLIPIDEMIA TYPE: ICD-10-CM

## 2017-08-30 DIAGNOSIS — E11.21 TYPE 2 DIABETES WITH NEPHROPATHY (HCC): ICD-10-CM

## 2017-08-30 DIAGNOSIS — I10 ESSENTIAL HYPERTENSION: ICD-10-CM

## 2017-08-30 LAB
ABSOLUTE EOS #: 0.4 K/UL (ref 0–0.4)
ABSOLUTE LYMPH #: 1.9 K/UL (ref 1–4.8)
ABSOLUTE MONO #: 0.3 K/UL (ref 0–1)
ANION GAP SERPL CALCULATED.3IONS-SCNC: 13 MMOL/L (ref 9–17)
BASOPHILS # BLD: 1 %
BASOPHILS ABSOLUTE: 0 K/UL (ref 0–0.2)
BUN BLDV-MCNC: 43 MG/DL (ref 8–23)
BUN/CREAT BLD: 28 (ref 9–20)
CALCIUM SERPL-MCNC: 9 MG/DL (ref 8.6–10.4)
CHLORIDE BLD-SCNC: 102 MMOL/L (ref 98–107)
CO2: 25 MMOL/L (ref 20–31)
CREAT SERPL-MCNC: 1.56 MG/DL (ref 0.5–0.9)
DIFFERENTIAL TYPE: ABNORMAL
EOSINOPHILS RELATIVE PERCENT: 7 %
GFR AFRICAN AMERICAN: 38 ML/MIN
GFR NON-AFRICAN AMERICAN: 31 ML/MIN
GFR SERPL CREATININE-BSD FRML MDRD: ABNORMAL ML/MIN/{1.73_M2}
GFR SERPL CREATININE-BSD FRML MDRD: ABNORMAL ML/MIN/{1.73_M2}
GLUCOSE BLD-MCNC: 83 MG/DL (ref 70–99)
HCT VFR BLD CALC: 31.8 % (ref 36–46)
HEMOGLOBIN: 10.5 G/DL (ref 12–16)
LYMPHOCYTES # BLD: 30 %
MCH RBC QN AUTO: 27.9 PG (ref 26–34)
MCHC RBC AUTO-ENTMCNC: 32.9 G/DL (ref 31–37)
MCV RBC AUTO: 84.8 FL (ref 80–100)
MONOCYTES # BLD: 4 %
PDW BLD-RTO: 17.8 % (ref 12.1–15.2)
PLATELET # BLD: 244 K/UL (ref 140–450)
PLATELET ESTIMATE: ABNORMAL
PMV BLD AUTO: 9.7 FL (ref 6–12)
POTASSIUM SERPL-SCNC: 4.6 MMOL/L (ref 3.7–5.3)
RBC # BLD: 3.76 M/UL (ref 4–5.2)
RBC # BLD: ABNORMAL 10*6/UL
SEG NEUTROPHILS: 58 %
SEGMENTED NEUTROPHILS ABSOLUTE COUNT: 3.7 K/UL (ref 1.8–7.7)
SODIUM BLD-SCNC: 140 MMOL/L (ref 135–144)
WBC # BLD: 6.3 K/UL (ref 3.5–11)
WBC # BLD: ABNORMAL 10*3/UL

## 2017-08-30 PROCEDURE — 36415 COLL VENOUS BLD VENIPUNCTURE: CPT

## 2017-08-30 PROCEDURE — 80048 BASIC METABOLIC PNL TOTAL CA: CPT

## 2017-08-30 PROCEDURE — 85025 COMPLETE CBC W/AUTO DIFF WBC: CPT

## 2017-09-28 ENCOUNTER — HOSPITAL ENCOUNTER (OUTPATIENT)
Age: 82
Discharge: HOME | End: 2017-09-28
Payer: MEDICARE

## 2017-09-28 LAB — POTASSIUM SERPL-SCNC: 5.1 MMOL/L (ref 3.7–5.3)

## 2017-09-28 PROCEDURE — 84132 ASSAY OF SERUM POTASSIUM: CPT

## 2017-09-28 PROCEDURE — 36415 COLL VENOUS BLD VENIPUNCTURE: CPT

## 2017-10-30 ENCOUNTER — HOSPITAL ENCOUNTER (OUTPATIENT)
Age: 82
Discharge: HOME | End: 2017-10-30
Payer: MEDICARE

## 2017-10-30 DIAGNOSIS — E87.5 HYPERKALEMIA: ICD-10-CM

## 2017-10-30 LAB — POTASSIUM SERPL-SCNC: 5 MMOL/L (ref 3.7–5.3)

## 2017-10-30 PROCEDURE — 36415 COLL VENOUS BLD VENIPUNCTURE: CPT

## 2017-10-30 PROCEDURE — 84132 ASSAY OF SERUM POTASSIUM: CPT

## 2017-11-06 ENCOUNTER — HOSPITAL ENCOUNTER (OUTPATIENT)
Age: 82
Discharge: HOME | End: 2017-11-06
Payer: MEDICARE

## 2017-11-06 DIAGNOSIS — E87.5 HYPERKALEMIA: ICD-10-CM

## 2017-11-06 LAB
ANION GAP SERPL CALCULATED.3IONS-SCNC: 14 MMOL/L (ref 9–17)
BUN BLDV-MCNC: 43 MG/DL (ref 8–23)
BUN/CREAT BLD: 28 (ref 9–20)
CALCIUM SERPL-MCNC: 9.1 MG/DL (ref 8.6–10.4)
CHLORIDE BLD-SCNC: 104 MMOL/L (ref 98–107)
CO2: 25 MMOL/L (ref 20–31)
CREAT SERPL-MCNC: 1.54 MG/DL (ref 0.5–0.9)
GFR AFRICAN AMERICAN: 38 ML/MIN
GFR NON-AFRICAN AMERICAN: 32 ML/MIN
GFR SERPL CREATININE-BSD FRML MDRD: ABNORMAL ML/MIN/{1.73_M2}
GFR SERPL CREATININE-BSD FRML MDRD: ABNORMAL ML/MIN/{1.73_M2}
GLUCOSE BLD-MCNC: 171 MG/DL (ref 70–99)
POTASSIUM SERPL-SCNC: 4.4 MMOL/L (ref 3.7–5.3)
SODIUM BLD-SCNC: 143 MMOL/L (ref 135–144)

## 2017-11-06 PROCEDURE — 36415 COLL VENOUS BLD VENIPUNCTURE: CPT

## 2017-11-06 PROCEDURE — 80048 BASIC METABOLIC PNL TOTAL CA: CPT

## 2017-11-13 ENCOUNTER — HOSPITAL ENCOUNTER (OUTPATIENT)
Age: 82
Discharge: HOME | End: 2017-11-13
Payer: MEDICARE

## 2017-11-13 DIAGNOSIS — E11.21 TYPE 2 DIABETES WITH NEPHROPATHY (HCC): ICD-10-CM

## 2017-11-13 LAB
-: ABNORMAL
AMORPHOUS: ABNORMAL
BACTERIA: ABNORMAL
BILIRUBIN URINE: NEGATIVE
CASTS UA: ABNORMAL /LPF
COLOR: YELLOW
COMMENT UA: ABNORMAL
CREATININE URINE: 47.4 MG/DL (ref 28–217)
CRYSTALS, UA: ABNORMAL /HPF
EPITHELIAL CELLS UA: ABNORMAL /HPF (ref 0–25)
ESTIMATED AVERAGE GLUCOSE: 131 MG/DL
GLUCOSE URINE: NEGATIVE
HBA1C MFR BLD: 6.2 % (ref 4.8–5.9)
KETONES, URINE: NEGATIVE
LEUKOCYTE ESTERASE, URINE: ABNORMAL
MICROALBUMIN/CREAT 24H UR: 574 MG/L
MICROALBUMIN/CREAT UR-RTO: 1211 MCG/MG CREAT
MUCUS: ABNORMAL
NITRITE, URINE: POSITIVE
OTHER OBSERVATIONS UA: ABNORMAL
PH UA: 7 (ref 5–9)
PROTEIN UA: ABNORMAL
RBC UA: ABNORMAL /HPF (ref 0–2)
RENAL EPITHELIAL, UA: ABNORMAL /HPF
SPECIFIC GRAVITY UA: 1.02 (ref 1.01–1.02)
TRICHOMONAS: ABNORMAL
TURBIDITY: CLEAR
URINE HGB: ABNORMAL
UROBILINOGEN, URINE: NORMAL
WBC UA: ABNORMAL /HPF (ref 0–5)
YEAST: ABNORMAL

## 2017-11-13 PROCEDURE — 82043 UR ALBUMIN QUANTITATIVE: CPT

## 2017-11-13 PROCEDURE — 83036 HEMOGLOBIN GLYCOSYLATED A1C: CPT

## 2017-11-13 PROCEDURE — 87086 URINE CULTURE/COLONY COUNT: CPT

## 2017-11-13 PROCEDURE — 82570 ASSAY OF URINE CREATININE: CPT

## 2017-11-13 PROCEDURE — 36415 COLL VENOUS BLD VENIPUNCTURE: CPT

## 2017-11-13 PROCEDURE — 87077 CULTURE AEROBIC IDENTIFY: CPT

## 2017-11-13 PROCEDURE — 87186 SC STD MICRODIL/AGAR DIL: CPT

## 2017-11-13 PROCEDURE — 81001 URINALYSIS AUTO W/SCOPE: CPT

## 2017-11-15 LAB
CULTURE: ABNORMAL
CULTURE: ABNORMAL
Lab: ABNORMAL
Lab: ABNORMAL
ORGANISM: ABNORMAL
SPECIMEN DESCRIPTION: ABNORMAL
SPECIMEN DESCRIPTION: ABNORMAL
STATUS: ABNORMAL

## 2017-11-22 ENCOUNTER — HOSPITAL ENCOUNTER (OUTPATIENT)
Age: 82
Discharge: HOME | DRG: 291 | End: 2017-11-22
Payer: MEDICARE

## 2017-11-22 ENCOUNTER — HOSPITAL ENCOUNTER (OUTPATIENT)
Dept: GENERAL RADIOLOGY | Age: 82
Discharge: HOME | DRG: 291 | End: 2017-11-22
Payer: MEDICARE

## 2017-11-22 ENCOUNTER — HOSPITAL ENCOUNTER (INPATIENT)
Age: 82
LOS: 5 days | Discharge: NURSING, REHAB OR LONG-TERM CARE | DRG: 291 | End: 2017-11-27
Attending: INTERNAL MEDICINE | Admitting: INTERNAL MEDICINE
Payer: MEDICARE

## 2017-11-22 DIAGNOSIS — J40 BRONCHITIS: ICD-10-CM

## 2017-11-22 DIAGNOSIS — J18.9 COMMUNITY ACQUIRED PNEUMONIA OF LEFT LOWER LOBE OF LUNG: Primary | ICD-10-CM

## 2017-11-22 DIAGNOSIS — R06.2 WHEEZING: ICD-10-CM

## 2017-11-22 DIAGNOSIS — M79.89 LEG SWELLING: ICD-10-CM

## 2017-11-22 PROBLEM — I50.33 ACUTE ON CHRONIC DIASTOLIC CHF (CONGESTIVE HEART FAILURE), NYHA CLASS 4 (HCC): Status: ACTIVE | Noted: 2017-11-22

## 2017-11-22 PROBLEM — J16.0 CAP (COMMUNITY ACQUIRED PNEUMONIA) DUE TO CHLAMYDIA SPECIES: Status: ACTIVE | Noted: 2017-11-22

## 2017-11-22 PROBLEM — N17.9 ACUTE ON CHRONIC RENAL FAILURE (HCC): Status: ACTIVE | Noted: 2017-11-22

## 2017-11-22 PROBLEM — E87.5 ACUTE HYPERKALEMIA: Status: ACTIVE | Noted: 2017-11-22

## 2017-11-22 PROBLEM — N18.9 ACUTE ON CHRONIC RENAL FAILURE (HCC): Status: ACTIVE | Noted: 2017-11-22

## 2017-11-22 LAB
-: NORMAL
ABSOLUTE BANDS #: 0.11 K/UL (ref 0–1)
ABSOLUTE BANDS #: 0.32 K/UL (ref 0–1)
ABSOLUTE EOS #: 0 K/UL (ref 0–0.4)
ABSOLUTE EOS #: 0 K/UL (ref 0–0.4)
ABSOLUTE IMMATURE GRANULOCYTE: ABNORMAL K/UL (ref 0–0.3)
ABSOLUTE IMMATURE GRANULOCYTE: ABNORMAL K/UL (ref 0–0.3)
ABSOLUTE LYMPH #: 0.54 K/UL (ref 1–4.8)
ABSOLUTE LYMPH #: 0.74 K/UL (ref 1–4.8)
ABSOLUTE MONO #: 0.32 K/UL (ref 0–1)
ABSOLUTE MONO #: 0.32 K/UL (ref 0–1)
ALBUMIN SERPL-MCNC: 3.5 G/DL (ref 3.5–5.2)
ALBUMIN SERPL-MCNC: 3.6 G/DL (ref 3.5–5.2)
ALBUMIN/GLOBULIN RATIO: 0.9 (ref 1–2.5)
ALBUMIN/GLOBULIN RATIO: 0.9 (ref 1–2.5)
ALP BLD-CCNC: 121 U/L (ref 35–104)
ALP BLD-CCNC: 125 U/L (ref 35–104)
ALT SERPL-CCNC: 25 U/L (ref 5–33)
ALT SERPL-CCNC: 26 U/L (ref 5–33)
ANION GAP SERPL CALCULATED.3IONS-SCNC: 17 MMOL/L (ref 9–17)
ANION GAP SERPL CALCULATED.3IONS-SCNC: 21 MMOL/L (ref 9–17)
AST SERPL-CCNC: 41 U/L
AST SERPL-CCNC: 48 U/L
BANDS: 1 % (ref 0–10)
BANDS: 3 % (ref 0–10)
BASOPHILS # BLD: 0 % (ref 0–2)
BASOPHILS # BLD: 1 % (ref 0–2)
BASOPHILS ABSOLUTE: 0 K/UL (ref 0–0.2)
BASOPHILS ABSOLUTE: 0.11 K/UL (ref 0–0.2)
BILIRUB SERPL-MCNC: 0.27 MG/DL (ref 0.3–1.2)
BILIRUB SERPL-MCNC: 0.59 MG/DL (ref 0.3–1.2)
BNP INTERPRETATION: ABNORMAL
BUN BLDV-MCNC: 52 MG/DL (ref 8–23)
BUN BLDV-MCNC: 54 MG/DL (ref 8–23)
BUN/CREAT BLD: 23 (ref 9–20)
BUN/CREAT BLD: 24 (ref 9–20)
CALCIUM SERPL-MCNC: 8.7 MG/DL (ref 8.6–10.4)
CALCIUM SERPL-MCNC: 8.9 MG/DL (ref 8.6–10.4)
CHLORIDE BLD-SCNC: 95 MMOL/L (ref 98–107)
CHLORIDE BLD-SCNC: 97 MMOL/L (ref 98–107)
CO2: 16 MMOL/L (ref 20–31)
CO2: 19 MMOL/L (ref 20–31)
CREAT SERPL-MCNC: 2.29 MG/DL (ref 0.5–0.9)
CREAT SERPL-MCNC: 2.3 MG/DL (ref 0.5–0.9)
DIFFERENTIAL TYPE: ABNORMAL
DIFFERENTIAL TYPE: ABNORMAL
EOSINOPHILS RELATIVE PERCENT: 0 % (ref 0–8)
EOSINOPHILS RELATIVE PERCENT: 0 % (ref 0–8)
ESTIMATED AVERAGE GLUCOSE: 128 MG/DL
GFR AFRICAN AMERICAN: 24 ML/MIN
GFR AFRICAN AMERICAN: 24 ML/MIN
GFR NON-AFRICAN AMERICAN: 20 ML/MIN
GFR NON-AFRICAN AMERICAN: 20 ML/MIN
GFR SERPL CREATININE-BSD FRML MDRD: ABNORMAL ML/MIN/{1.73_M2}
GLUCOSE BLD-MCNC: 145 MG/DL (ref 74–100)
GLUCOSE BLD-MCNC: 187 MG/DL (ref 70–99)
GLUCOSE BLD-MCNC: 350 MG/DL (ref 70–99)
GLUCOSE BLD-MCNC: 469 MG/DL (ref 74–100)
HBA1C MFR BLD: 6.1 % (ref 4.8–5.9)
HCT VFR BLD CALC: 27.7 % (ref 36–46)
HCT VFR BLD CALC: 29.7 % (ref 36–46)
HEMOGLOBIN: 8.9 G/DL (ref 12–16)
HEMOGLOBIN: 9.4 G/DL (ref 12–16)
IMMATURE GRANULOCYTES: ABNORMAL %
IMMATURE GRANULOCYTES: ABNORMAL %
LYMPHOCYTES # BLD: 5 % (ref 24–44)
LYMPHOCYTES # BLD: 7 % (ref 24–44)
MCH RBC QN AUTO: 27.2 PG (ref 26–34)
MCH RBC QN AUTO: 27.3 PG (ref 26–34)
MCHC RBC AUTO-ENTMCNC: 31.5 G/DL (ref 31–37)
MCHC RBC AUTO-ENTMCNC: 32.2 G/DL (ref 31–37)
MCV RBC AUTO: 84.5 FL (ref 80–100)
MCV RBC AUTO: 86.7 FL (ref 80–100)
MONOCYTES # BLD: 3 % (ref 0–12)
MONOCYTES # BLD: 3 % (ref 0–12)
PDW BLD-RTO: 16.9 % (ref 12.1–15.2)
PDW BLD-RTO: 17.1 % (ref 12.1–15.2)
PLATELET # BLD: 188 K/UL (ref 140–450)
PLATELET # BLD: 214 K/UL (ref 140–450)
PLATELET ESTIMATE: ABNORMAL
PLATELET ESTIMATE: ABNORMAL
PMV BLD AUTO: 10.6 FL (ref 6–12)
PMV BLD AUTO: 10.7 FL (ref 6–12)
POTASSIUM SERPL-SCNC: 4.9 MMOL/L (ref 3.7–5.3)
POTASSIUM SERPL-SCNC: 5.4 MMOL/L (ref 3.7–5.3)
PRO-BNP: ABNORMAL PG/ML
RBC # BLD: 3.28 M/UL (ref 4–5.2)
RBC # BLD: 3.43 M/UL (ref 4–5.2)
RBC # BLD: ABNORMAL 10*6/UL
RBC # BLD: ABNORMAL 10*6/UL
REASON FOR REJECTION: NORMAL
SEG NEUTROPHILS: 88 % (ref 36–66)
SEG NEUTROPHILS: 89 % (ref 36–66)
SEGMENTED NEUTROPHILS ABSOLUTE COUNT: 9.33 K/UL (ref 1.8–7.7)
SEGMENTED NEUTROPHILS ABSOLUTE COUNT: 9.41 K/UL (ref 1.8–7.7)
SODIUM BLD-SCNC: 131 MMOL/L (ref 135–144)
SODIUM BLD-SCNC: 134 MMOL/L (ref 135–144)
TOTAL PROTEIN: 7.2 G/DL (ref 6.4–8.3)
TOTAL PROTEIN: 7.7 G/DL (ref 6.4–8.3)
WBC # BLD: 10.5 K/UL (ref 3.5–11)
WBC # BLD: 10.7 K/UL (ref 3.5–11)
WBC # BLD: ABNORMAL 10*3/UL
WBC # BLD: ABNORMAL 10*3/UL
ZZ NTE CLEAN UP: ORDERED TEST: NORMAL
ZZ NTE WITH NAME CLEAN UP: SPECIMEN SOURCE: NORMAL

## 2017-11-22 PROCEDURE — 87040 BLOOD CULTURE FOR BACTERIA: CPT

## 2017-11-22 PROCEDURE — 94667 MNPJ CHEST WALL 1ST: CPT

## 2017-11-22 PROCEDURE — 2580000003 HC RX 258: Performed by: INTERNAL MEDICINE

## 2017-11-22 PROCEDURE — G8987 SELF CARE CURRENT STATUS: HCPCS

## 2017-11-22 PROCEDURE — G8979 MOBILITY GOAL STATUS: HCPCS

## 2017-11-22 PROCEDURE — 94761 N-INVAS EAR/PLS OXIMETRY MLT: CPT

## 2017-11-22 PROCEDURE — 1200000000 HC SEMI PRIVATE

## 2017-11-22 PROCEDURE — 6370000000 HC RX 637 (ALT 250 FOR IP): Performed by: INTERNAL MEDICINE

## 2017-11-22 PROCEDURE — 94640 AIRWAY INHALATION TREATMENT: CPT

## 2017-11-22 PROCEDURE — G8996 SWALLOW CURRENT STATUS: HCPCS

## 2017-11-22 PROCEDURE — 92610 EVALUATE SWALLOWING FUNCTION: CPT

## 2017-11-22 PROCEDURE — 36415 COLL VENOUS BLD VENIPUNCTURE: CPT

## 2017-11-22 PROCEDURE — 94664 DEMO&/EVAL PT USE INHALER: CPT

## 2017-11-22 PROCEDURE — 94760 N-INVAS EAR/PLS OXIMETRY 1: CPT

## 2017-11-22 PROCEDURE — 94668 MNPJ CHEST WALL SBSQ: CPT

## 2017-11-22 PROCEDURE — 99222 1ST HOSP IP/OBS MODERATE 55: CPT | Performed by: FAMILY MEDICINE

## 2017-11-22 PROCEDURE — 83880 ASSAY OF NATRIURETIC PEPTIDE: CPT

## 2017-11-22 PROCEDURE — 97166 OT EVAL MOD COMPLEX 45 MIN: CPT

## 2017-11-22 PROCEDURE — G8988 SELF CARE GOAL STATUS: HCPCS

## 2017-11-22 PROCEDURE — 71020 XR CHEST STANDARD TWO VW: CPT

## 2017-11-22 PROCEDURE — 85025 COMPLETE CBC W/AUTO DIFF WBC: CPT

## 2017-11-22 PROCEDURE — G8998 SWALLOW D/C STATUS: HCPCS

## 2017-11-22 PROCEDURE — 97161 PT EVAL LOW COMPLEX 20 MIN: CPT

## 2017-11-22 PROCEDURE — 80053 COMPREHEN METABOLIC PANEL: CPT

## 2017-11-22 PROCEDURE — 83036 HEMOGLOBIN GLYCOSYLATED A1C: CPT

## 2017-11-22 PROCEDURE — G8978 MOBILITY CURRENT STATUS: HCPCS

## 2017-11-22 PROCEDURE — 6360000002 HC RX W HCPCS: Performed by: INTERNAL MEDICINE

## 2017-11-22 PROCEDURE — 97530 THERAPEUTIC ACTIVITIES: CPT

## 2017-11-22 PROCEDURE — G8997 SWALLOW GOAL STATUS: HCPCS

## 2017-11-22 PROCEDURE — 82947 ASSAY GLUCOSE BLOOD QUANT: CPT

## 2017-11-22 RX ORDER — ATORVASTATIN CALCIUM 20 MG/1
20 TABLET, FILM COATED ORAL NIGHTLY
Status: DISCONTINUED | OUTPATIENT
Start: 2017-11-22 | End: 2017-11-27 | Stop reason: HOSPADM

## 2017-11-22 RX ORDER — ALBUTEROL SULFATE 2.5 MG/3ML
2.5 SOLUTION RESPIRATORY (INHALATION) EVERY 4 HOURS PRN
Status: DISCONTINUED | OUTPATIENT
Start: 2017-11-22 | End: 2017-11-27 | Stop reason: HOSPADM

## 2017-11-22 RX ORDER — ACETAMINOPHEN 325 MG/1
650 TABLET ORAL EVERY 4 HOURS PRN
Status: DISCONTINUED | OUTPATIENT
Start: 2017-11-22 | End: 2017-11-27 | Stop reason: HOSPADM

## 2017-11-22 RX ORDER — LEVOTHYROXINE SODIUM 88 UG/1
88 TABLET ORAL
Status: DISCONTINUED | OUTPATIENT
Start: 2017-11-23 | End: 2017-11-27 | Stop reason: HOSPADM

## 2017-11-22 RX ORDER — CLOPIDOGREL BISULFATE 75 MG/1
75 TABLET ORAL DAILY
Status: DISCONTINUED | OUTPATIENT
Start: 2017-11-22 | End: 2017-11-27 | Stop reason: HOSPADM

## 2017-11-22 RX ORDER — NICOTINE POLACRILEX 4 MG
15 LOZENGE BUCCAL PRN
Status: DISCONTINUED | OUTPATIENT
Start: 2017-11-22 | End: 2017-11-27 | Stop reason: HOSPADM

## 2017-11-22 RX ORDER — MECLIZINE HYDROCHLORIDE 25 MG/1
25 TABLET ORAL 3 TIMES DAILY PRN
Status: DISCONTINUED | OUTPATIENT
Start: 2017-11-22 | End: 2017-11-24

## 2017-11-22 RX ORDER — DEXTROSE MONOHYDRATE 50 MG/ML
100 INJECTION, SOLUTION INTRAVENOUS PRN
Status: DISCONTINUED | OUTPATIENT
Start: 2017-11-22 | End: 2017-11-27 | Stop reason: HOSPADM

## 2017-11-22 RX ORDER — SODIUM CHLORIDE 0.9 % (FLUSH) 0.9 %
10 SYRINGE (ML) INJECTION PRN
Status: DISCONTINUED | OUTPATIENT
Start: 2017-11-22 | End: 2017-11-27 | Stop reason: HOSPADM

## 2017-11-22 RX ORDER — GABAPENTIN 100 MG/1
100 CAPSULE ORAL 3 TIMES DAILY
Status: DISCONTINUED | OUTPATIENT
Start: 2017-11-22 | End: 2017-11-27 | Stop reason: HOSPADM

## 2017-11-22 RX ORDER — FAMOTIDINE 20 MG/1
20 TABLET, FILM COATED ORAL NIGHTLY
Status: DISCONTINUED | OUTPATIENT
Start: 2017-11-22 | End: 2017-11-27 | Stop reason: HOSPADM

## 2017-11-22 RX ORDER — IPRATROPIUM BROMIDE AND ALBUTEROL SULFATE 2.5; .5 MG/3ML; MG/3ML
1 SOLUTION RESPIRATORY (INHALATION) 4 TIMES DAILY
Status: DISCONTINUED | OUTPATIENT
Start: 2017-11-22 | End: 2017-11-27 | Stop reason: HOSPADM

## 2017-11-22 RX ORDER — AMLODIPINE BESYLATE 10 MG/1
10 TABLET ORAL DAILY
Status: DISCONTINUED | OUTPATIENT
Start: 2017-11-22 | End: 2017-11-27 | Stop reason: HOSPADM

## 2017-11-22 RX ORDER — ONDANSETRON 2 MG/ML
4 INJECTION INTRAMUSCULAR; INTRAVENOUS EVERY 6 HOURS PRN
Status: DISCONTINUED | OUTPATIENT
Start: 2017-11-22 | End: 2017-11-27 | Stop reason: HOSPADM

## 2017-11-22 RX ORDER — DEXTROSE MONOHYDRATE 25 G/50ML
12.5 INJECTION, SOLUTION INTRAVENOUS PRN
Status: DISCONTINUED | OUTPATIENT
Start: 2017-11-22 | End: 2017-11-27 | Stop reason: HOSPADM

## 2017-11-22 RX ORDER — HEPARIN SODIUM 5000 [USP'U]/ML
5000 INJECTION, SOLUTION INTRAVENOUS; SUBCUTANEOUS EVERY 12 HOURS
Status: DISCONTINUED | OUTPATIENT
Start: 2017-11-22 | End: 2017-11-27 | Stop reason: HOSPADM

## 2017-11-22 RX ORDER — IPRATROPIUM BROMIDE AND ALBUTEROL SULFATE 2.5; .5 MG/3ML; MG/3ML
1 SOLUTION RESPIRATORY (INHALATION)
Status: DISCONTINUED | OUTPATIENT
Start: 2017-11-22 | End: 2017-11-22

## 2017-11-22 RX ORDER — FERROUS SULFATE 325(65) MG
325 TABLET ORAL
Status: DISCONTINUED | OUTPATIENT
Start: 2017-11-23 | End: 2017-11-27 | Stop reason: HOSPADM

## 2017-11-22 RX ORDER — FUROSEMIDE 10 MG/ML
20 INJECTION INTRAMUSCULAR; INTRAVENOUS ONCE
Status: COMPLETED | OUTPATIENT
Start: 2017-11-23 | End: 2017-11-23

## 2017-11-22 RX ORDER — SODIUM CHLORIDE 0.9 % (FLUSH) 0.9 %
10 SYRINGE (ML) INJECTION EVERY 12 HOURS SCHEDULED
Status: DISCONTINUED | OUTPATIENT
Start: 2017-11-22 | End: 2017-11-27 | Stop reason: HOSPADM

## 2017-11-22 RX ORDER — ALLOPURINOL 100 MG/1
100 TABLET ORAL DAILY
Status: DISCONTINUED | OUTPATIENT
Start: 2017-11-22 | End: 2017-11-27 | Stop reason: HOSPADM

## 2017-11-22 RX ADMIN — Medication 10 ML: at 22:30

## 2017-11-22 RX ADMIN — AMLODIPINE BESYLATE 10 MG: 10 TABLET ORAL at 13:57

## 2017-11-22 RX ADMIN — ATORVASTATIN CALCIUM 20 MG: 20 TABLET, FILM COATED ORAL at 22:28

## 2017-11-22 RX ADMIN — CLOPIDOGREL BISULFATE 75 MG: 75 TABLET ORAL at 13:57

## 2017-11-22 RX ADMIN — IPRATROPIUM BROMIDE AND ALBUTEROL SULFATE 1 AMPULE: .5; 3 SOLUTION RESPIRATORY (INHALATION) at 14:55

## 2017-11-22 RX ADMIN — GABAPENTIN 100 MG: 100 CAPSULE ORAL at 22:28

## 2017-11-22 RX ADMIN — HEPARIN SODIUM 5000 UNITS: 5000 INJECTION, SOLUTION INTRAVENOUS; SUBCUTANEOUS at 22:29

## 2017-11-22 RX ADMIN — AZITHROMYCIN MONOHYDRATE 500 MG: 500 INJECTION, POWDER, LYOPHILIZED, FOR SOLUTION INTRAVENOUS at 14:07

## 2017-11-22 RX ADMIN — INSULIN LISPRO 12 UNITS: 100 INJECTION, SOLUTION INTRAVENOUS; SUBCUTANEOUS at 16:41

## 2017-11-22 RX ADMIN — CEFTRIAXONE 1 G: 1 INJECTION, POWDER, FOR SOLUTION INTRAMUSCULAR; INTRAVENOUS at 13:58

## 2017-11-22 RX ADMIN — Medication 10 ML: at 13:58

## 2017-11-22 RX ADMIN — GABAPENTIN 100 MG: 100 CAPSULE ORAL at 13:57

## 2017-11-22 RX ADMIN — IPRATROPIUM BROMIDE AND ALBUTEROL SULFATE 1 AMPULE: .5; 3 SOLUTION RESPIRATORY (INHALATION) at 20:05

## 2017-11-22 RX ADMIN — ALLOPURINOL 100 MG: 100 TABLET ORAL at 13:57

## 2017-11-22 RX ADMIN — INSULIN LISPRO 1 UNITS: 100 INJECTION, SOLUTION INTRAVENOUS; SUBCUTANEOUS at 22:40

## 2017-11-22 RX ADMIN — FAMOTIDINE 20 MG: 20 TABLET, FILM COATED ORAL at 22:28

## 2017-11-22 ASSESSMENT — PAIN DESCRIPTION - PAIN TYPE
TYPE: ACUTE PAIN
TYPE: ACUTE PAIN

## 2017-11-22 ASSESSMENT — PAIN SCALES - GENERAL
PAINLEVEL_OUTOF10: 9
PAINLEVEL_OUTOF10: 0
PAINLEVEL_OUTOF10: 0
PAINLEVEL_OUTOF10: 3

## 2017-11-22 ASSESSMENT — PAIN - FUNCTIONAL ASSESSMENT: PAIN_FUNCTIONAL_ASSESSMENT: 0-10

## 2017-11-22 ASSESSMENT — PAIN DESCRIPTION - LOCATION
LOCATION: ARM
LOCATION: GENERALIZED

## 2017-11-22 NOTE — PROGRESS NOTES
Occupational Therapy   Occupational Therapy Initial Assessment  Date: 2017   Patient Name: Lincoln Dalal  MRN: [id number]     : 1925    Patient Diagnosis(es): There were no encounter diagnoses. has a past medical history of Cancer ([address] Headache(784.0); Hyperlipidemia; Hypertension; Macular degeneration; Osteoporosis; TIA (transient ischemic attack); and Type II or unspecified type diabetes mellitus without mention of complication, not stated as uncontrolled. has a past surgical history that includes Hysterectomy; Cholecystectomy; Spine surgery; Thyroidectomy; and Ankle surgery (Left). Restrictions  Restrictions/Precautions  Restrictions/Precautions: General Precautions, Fall Risk    Subjective   General  Chart Reviewed: Yes  Patient assessed for rehabilitation services?: Yes  Pain Assessment  Patient Currently in Pain: Yes  Pain Assessment: 0-10  Pain Level: 3  Pain Type: Acute pain  Pain Location: Arm  Patient Observation  Observations: acapella x10  Social/Functional History  Social/Functional History  Lives With: Alone  Type of Home: Assisted living  Home Layout: One level  Home Access: Stairs to enter with rails  Entrance Stairs - Number of Steps: 1  Home Equipment: Rolling walker, 4 wheeled walker  ADL Assistance: Independent  Homemaking Assistance: Independent  Active : No  IADL Comments: Pt stated that she was (I) PTA with [facility]. Pt stated that she lives in [address] [place] [address]. Pt states her daughter takes her to grocery store and pt typically uses a manual wheelchair in store.         Objective   Vision: Within Functional Limits  Hearing: Exceptions to [facility]  Hearing Exceptions: Hard of hearing/hearing concerns             ADL  Feeding: Independent  Grooming: Contact guard assistance  UE Bathing: Stand by assistance  LE Bathing: Minimal assistance  UE Dressing: Stand by assistance  LE Dressing: Minimal assistance  Toileting: Contact guard assistance LUE AROM (degrees)  LUE AROM : WFL  LUE General AROM: Shoulder 0 ~ 75  Left Hand AROM (degrees)  Left Hand AROM: WFL  RUE AROM (degrees)  RUE AROM : WFL  RUE General AROM: Shoulder 0 ~ 75  Right Hand AROM (degrees)  Right Hand AROM: WFL                     Assessment   Performance deficits / Impairments: Decreased functional mobility ; Decreased ADL status; Decreased safe awareness;Decreased strength;Decreased balance  Prognosis: Fair  Decision Making: Medium Complexity  Patient Education: OT services, POC  Discharge Recommendations: Continue to assess pending progress;Subacute/Skilled Nursing Facility  REQUIRES OT FOLLOW UP: Yes  Safety Devices  Safety Devices in place: Yes  Type of devices: Call light within reach; Chair alarm in place; Left in chair        Discharge Recommendations:  Continue to assess pending progress, Subacute/Skilled Nursing Facility     Plan   Plan  Times per day: Daily  Current Treatment Recommendations: Strengthening, Balance Training, Safety Education & Training, Self-Care / ADL, Patient/Caregiver Education & Training, Equipment Evaluation, Education, & procurement, Home Management Training    G-Code  OT G-codes  Functional Limitation: Self care  Self Care Current Status (): At least 20 percent but less than 40 percent impaired, limited or restricted  Self Care Goal Status (): At least 1 percent but less than 20 percent impaired, limited or restricted  OutComes Score                                           Goals  Short term goals  Time Frame for Short term goals: 3-5 days  Short term goal 1: Patient to complete LB bathing/dressing with use of AE/DME PRN with SBA to improve indepenence during ADL's. Short term goal 2: Patient to complete toileting tasks c SUP to improve independence and safety during ADL's. Short term goal 3: Patient to engage in 10 minutes of ther ex/ther act to improve UE/UB strength and activity tolerance for ADL's, transfers and mobility.   Short term goal 4: Patient to state 5 EC/WS techniques to implement throughout daily tasks.         Therapy Time   Individual Concurrent Group Co-treatment   Time In 1410         Time Out 1430         Minutes 20                 Srinivas Casas, OTR/L

## 2017-11-22 NOTE — PROGRESS NOTES
assistance  Distance: 3ft  Stairs/Curb  Stairs?: No     Balance  Sitting - Static: Good  Sitting - Dynamic: Good  Standing - Static: Fair  Standing - Dynamic: Fair;-        Assessment   Body structures, Functions, Activity limitations: Decreased functional mobility ; Decreased ADL status; Decreased endurance;Decreased balance;Decreased strength;Decreased high-level IADLs  Prognosis: Good  Decision Making: Low Complexity  Patient Education: Purpose of PT eval and POC. REQUIRES PT FOLLOW UP: Yes  Activity Tolerance  Activity Tolerance: Patient Tolerated treatment well     Discharge Recommendations:  Continue to assess pending progress      Plan   Plan  Times per week: 7 days per week  Times per day: Twice a day (Daily on weekends)  Current Treatment Recommendations: Strengthening, Balance Training, Transfer Training, Gait Training, Endurance Training, Safety Education & Training, Home Exercise Program, Neuromuscular Re-education, IADL Training, Patient/Caregiver Education & Training, Stair training  Safety Devices  Type of devices: All fall risk precautions in place, Nurse notified    G-Code  PT G-Codes  Functional Limitation: Mobility: Walking and moving around  Mobility: Walking and Moving Around Current Status (): At least 40 percent but less than 60 percent impaired, limited or restricted  Mobility: Walking and Moving Around Goal Status (): At least 20 percent but less than 40 percent impaired, limited or restricted                                        Goals  Short term goals  Time Frame for Short term goals: 12 visits  Short term goal 1: Pt will initiate LE strengthening for ease of transfers. Short term goal 2: Pt will be independent with bed mob/transfers for functional independence. Short term goal 3: Pt will ambulate independently 100ft with RW without LOB for ease of household ambulation. Short term goal 4: Pt will tolerate 20-30mins ther ex/act to improve endurance for ADLs.         Therapy

## 2017-11-22 NOTE — PROGRESS NOTES
Facsimile Transmission Cover Sheet    Information contained in this transmission is for the sole use of the intended recipients and may contain confidential and privileged information. Any unauthorized review, use, disclosure or distribution is prohibited. If you are not the intended recipient, please contact the sender and destroy all copies of the original message. Disclosure is made for the purpose of healthcare operations and continuity of care. To: __Dr Jennyfer Melton_______________________    From: Speech Therapy       Sender:_Yoko Evans [facility], [facility] LIO [facility] SLP______________ Phone Number: [phone] (Amaya Mojica)    [address] [place] [address] current unit  [x][facility] [phone]  [phone]    Your bedside evaluation order for [address] [place] [address] has been completed. Based on the results speech therapy recommends:     Mechanical soft diet with thin liquids and the use of following strategies:  -no straws  -small bites/sips  -alternate liquids/solids  -slow rate  -upright for all PO intake  -remain upright 30-45 minutes after PO intake     If you agree please enter the new diet order in [facility] or telephone the nursing unit. Diet will not change without your order.    Thank you,  Electronically signed by: Conrad Foss [facility], [facility] VACA, [address] [place] [address]

## 2017-11-22 NOTE — PROGRESS NOTES
11/22/2017    PROT 7.7 11/22/2017    LABALBU 3.6 11/22/2017    BILITOT 0.59 11/22/2017    ALKPHOS 125 (H) 11/22/2017    AST 48 (H) 11/22/2017    ALT 26 11/22/2017    LABGLOM 20 (L) 11/22/2017    GFRAA 24 (L) 11/22/2017     Lab Results   Component Value Date    LABA1C 6.2 (H) 11/13/2017     Lab Results   Component Value Date     11/13/2017     Estimated Intake vs Estimated Needs: Intake Less Than Needs    Nutrition Risk Level: Moderate    Well controlled diabetes per A1C. Weight increases r/t fluid retention, with dependence on MOW for meals. Poor vision status, unable to read food labels. Noted history of her K+ being elevated, but not currently. Will place note on chart to alter current diet. Her choking spells with food are reportedly r/t hiatal hernia. States happens ~1x per month, and could be any type of food.      Nutrition Interventions:   Modify current diet (REESE and low K+)  Continued Inpatient Monitoring, Coordination of Care, Education not appropriate at this time    Nutrition Evaluation:   · Evaluation: Goals set   · Goals: PO >75% meals with low salt and K+    · Monitoring: Meal Intake, Pertinent Labs, Weight, Ascites/Edema    Electronically signed by Rita Schofield RD, LD on 11/22/17 at 12:52 PM    Contact Number: [phone]

## 2017-11-22 NOTE — H&P
1000 MG tablet TAKE 1 TABLET BY MOUTH TWICE DAILY WITH FOOD 8/22/17  Yes Ena Damon MD   glimepiride (AMARYL) 1 MG tablet TAKE 1 TABLET BY MOUTH ONCE DAILY IN THE MORNING 7/18/17  Yes Ena Damon MD   allopurinol (ZYLOPRIM) 300 MG tablet Take 1 tablet by mouth daily 6/12/17 11/13/26 Yes Ena Damon MD   lactase (LACTAID) 3000 UNITS tablet Take 4 tablets by mouth 3 times daily (with meals)    Yes Historical Provider, MD   clopidogrel (PLAVIX) 75 MG tablet TAKE 1 TABLET BY MOUTH ONCE A DAY 4/28/17  Yes Ena Damon MD   levothyroxine (SYNTHROID) 88 MCG tablet Take 88 mcg by mouth daily  2/4/17  Yes Historical Provider, MD   ferrous sulfate 325 (65 FE) MG tablet Take 325 mg by mouth daily (with breakfast). Yes Historical Provider, MD   meclizine (ANTIVERT) 25 MG tablet Take 25 mg by mouth 3 times daily as needed. Yes Historical Provider, MD       Allergies:  Review of patient's allergies indicates no known allergies. Social History:   TOBACCO:   reports that she has never smoked. She has never used smokeless tobacco.  ETOH:   reports that she does not drink alcohol.   OCCUPATION: retired    Family History:       Problem Relation Age of Onset    Stroke Mother     Heart Disease Father        Review of Systems:  Constitutional: positive for anorexia, chills, fatigue, fevers and malaise, negative for sweats and weight loss  Eyes: negative  Ears, nose, mouth, throat, and face: negative  Respiratory: positive for cough, dyspnea on exertion, shortness of breath and wheezing, negative for asthma, chronic bronchitis, emphysema, hemoptysis, pleurisy/chest pain, pneumonia, sputum and stridor  Cardiovascular: positive for dyspnea, fatigue, lower extremity edema and tachypnea, negative for chest pain, chest pressure/discomfort, claudication, exertional chest pressure/discomfort, irregular heart beat, near-syncope, orthopnea, palpitations, paroxysmal nocturnal dyspnea and syncope  Gastrointestinal: negative

## 2017-11-22 NOTE — PROGRESS NOTES
Speech Language Pathology  Facility/Department: [facility] MED SURG   BEDSIDE SWALLOW EVALUATION    NAME: Piyush Martinez  : 1925  MRN: [id number]    ADMISSION DATE: 2017  ADMITTING DIAGNOSIS: has Hypertension; Type 2 diabetes with nephropathy ([address] Hyperlipidemia; Left shoulder pain; Senile nuclear sclerosis; Malignant neoplasm of thyroid gland ([address] Renal failure; Calculus of kidney; Anemia; Transient cerebral ischemia; Asthma; Backache; CAP (community acquired pneumonia); Debility; Anemia; Chronic obstructive pulmonary disease ([address] Acute on chronic diastolic CHF (congestive heart failure), NYHA class 1 ([address] Community acquired pneumonia of left lower lobe of lung ([facility] [address] [facility] and Community acquired pneumonia of left lower lobe of lung ([address] on her problem list.  ONSET DATE:  Admitted 2017      Recent Chest Xray/CT of Chest: (2017) Suspect developing left lower lobe pneumonia    Date of Eval: 2017  Evaluating Therapist: Clay Rogers    Current Diet level:  Current Diet : Regular  Current Liquid Diet : Thin      Primary Complaint       Pain:  Pain Assessment  Patient Currently in Pain: Yes  Pain Assessment: 0-10  Pain Level: 3  Pain Type: Acute pain  Pain Location: Arm  RN notified    Reason for Referral  Piyush Martinez was referred for a bedside swallow evaluation to assess the efficiency of her swallow function, rule out aspiration and make recommendations regarding safe dietary consistencies, effective compensatory strategies, and safe eating environment. Impression  Dysphagia Diagnosis: Mild oral stage dysphagia;Mild pharyngeal stage dysphagia  Dysphagia Outcome Severity Scale: Level 5: Mild dysphagia- Distant supervision. May need one diet consistency restricted     Treatment Plan  Requires SLP Intervention: Yes  Duration/Frequency of Treatment: Daily during inpatient stay   D/C Recommendations:  To be determined       Recommended Diet and Intervention  Diet Solids Recommendation: Dysphagia III Advanced  Liquid Consistency Recommendation: Thin  Recommended Form of Meds: PO  Therapeutic Interventions: Patient/Family education, Therapeutic PO trials with SLP, Diet tolerance monitoring    Compensatory Swallowing Strategies  Compensatory Swallowing Strategies: Alternate solids and liquids;Small bites/sips;Upright as possible for all oral intake;Eat/Feed slowly; Remain upright for 30-45 minutes after meals    Treatment/Goals  Short-term Goals  Timeframe for Short-term Goals: 7-10 days  Goal 1: Patient will tolerate thin liquid trials x10 with no overt throat clear, cough or change in phonation  Goal 2: Patient will tolerate regular solid trials x10 with no overt throat clear, cough or change in phonation     General  Chart Reviewed: Yes  Subjective  Subjective: Reports intermittent food sticking and multi-year history of choking episodes  Behavior/Cognition: Alert; Cooperative  Temperature Spikes Noted: No  Respiratory Status: O2 via nasual cannula  O2 Device: Nasal cannula  Communication Observation: Functional  Follows Directions: Simple  Dentition: Dentures top;Dentures bottom  Patient Positioning: Upright in chair  Baseline Vocal Quality: Normal  Volitional Cough: Strong  Prior Dysphagia History: Knonw hx of hiatal hernia and intermittent coughing/choking with meals per patient report   Consistencies Trialed: Reg solid; Soft solid;Puree; Thin      Vision/Hearing  Vision  Vision: Within Functional Limits  Hearing  Hearing: Exceptions to [facility]  Hearing Exceptions: Hard of hearing/hearing concerns    Oral Motor Deficits  Oral/Motor  Oral Motor: Within functional limits    Oral Phase Dysfunction  Oral Phase  Oral Phase: WFL     Indicators of Pharyngeal Phase Dysfunction   Pharyngeal Phase  Pharyngeal Phase: Exceptions  Indicators of Pharyngeal Phase Dysfunction  Cough - Immediate: Soft Solid; Reg Solid  Pharyngeal Phase   Pharyngeal: Cough with soft solids and regular solids following 4/5 trials    Prognosis  Prognosis  Prognosis for safe diet advancement: fair  Barriers to reach goals: age  Individuals consulted  Consulted and agree with results and recommendations: Patient;RN    Education  Patient Education: Reviewed impressions and rec of [facility] soft diet with patient  Patient Education Response: Verbalizes understanding      JUSTIN-Code  SLP JUSTIN-Gretta  Functional Assessment Tool Used: SOHAM Functional Communication Measure   Functional Limitations: Swallowing  Swallow Current Status (): At least 20 percent but less than 40 percent impaired, limited or restricted  Swallow Goal Status (): At least 1 percent but less than 20 percent impaired, limited or restricted  Swallow Discharge Status (): At least 20 percent but less than 40 percent impaired, limited or restricted         Therapy Time  SLP Individual Minutes  Time In: 9351  Time Out: 9843  Minutes: 30            BSE completed this afternoon. Patient with history of hiatal hernia and reports intermittent choking/coughing with meals 2-3 times per week. Patient alert, oriented to all spheres, following 1-step directions and with fluent verbal output. On nasal cannula. No oral motor deficits appreciated. Patient with partial dentures on upper and lower arch. Motor speech was functional and greater than 90% intelligibile. Swallow function was assessed with thin liquids via cup, straw, puree (applesauce), soft solid (iron cracker mixed with applesauce) and solid (iron cracker). All trials self-administered.  Performance notable for:    -Adequate labial seal to spoon, cup and straw   -Adequate bolus formation/manipulation  -Adequate mastication given dentition status   -Mild post-swallow residue, cleared independently with liquid wash  -Swallow onset appeared timely  -Laryngeal elevation was adequate to palpatation  -No excess swallows  -Throat clear and cough with solids and questionable following liquid wash     Patient presents with mild oropharyngeal dysphagia. Likely etiology is secondary to new onset of oxygen requirement which may impede patient's ability to coordinate her breathe/swallow pattern. She would benefit from dietary downgrade to mechanical soft foods with thin liquids. This use of a modified diet and the following compensatory strategies will mitigate but not eliminate ongoing risk of penetration/aspiration in a meal setting:     -Slow rate  -Small bites/sips  -Remain upright for all PO intake  -Alternate liquids/solids  -Medications PO as able    ST will follow for diet tolerance monitoring and therapeutic trials during inpatient admission.          Pratik Yee [facility] MEDICO DEL [facility]-SLP  11/22/2017 3:54 PM

## 2017-11-22 NOTE — PROGRESS NOTES
[facility] Department of Pharmacy   Pharmacy Renal Adjustment Note    Coy Vital is a 80 y.o. female. Pharmacist assessment of renally cleared medications. Recent Labs      11/22/17   0943   CREATININE  2.30*     Estimated Creatinine Clearance: 14 mL/min (based on SCr of 2.3 mg/dL).     Height:   Ht Readings from Last 1 Encounters:   11/22/17 5' 1\" (1.549 m)     Weight:  Wt Readings from Last 1 Encounters:   11/22/17 164 lb 4 oz (74.5 kg)       The following medication has been adjusted based upon renal function:             Famotidine 20mg po twice daily decreased to famotidine 20mg po daily    Thank you,  Isabela Kat,11/22/2017,12:29 PM

## 2017-11-22 NOTE — PROGRESS NOTES
Pharmacy is not verifying Lovenox d/t creatinine clearance of 14 ml/min. They are wanting Heparin as a replacement. Do you want to change this order?

## 2017-11-22 NOTE — CONSULTS
Take 1 tablet by mouth See Admin Instructions for 5 days Take 2 tablets on day 1 then 1 tablet each day after for 4 days. 6 tablet 0    sulfamethoxazole-trimethoprim (BACTRIM DS) 800-160 MG per tablet Take 1 tablet by mouth 2 times daily for 10 days 20 tablet 0    amLODIPine (NORVASC) 10 MG tablet Take 1 tablet by mouth daily 30 tablet 5    hydrochlorothiazide (MICROZIDE) 12.5 MG capsule Take 12.5 mg by mouth daily      rosuvastatin (CRESTOR) 5 MG tablet TAKE 1 TABLET BY MOUTH ONCE A DAY 30 tablet 5    gabapentin (NEURONTIN) 100 MG capsule TAKE 1 CAPSULE BY MOUTH THREE TIMES DAILY 90 capsule 5    metFORMIN (GLUCOPHAGE) 1000 MG tablet TAKE 1 TABLET BY MOUTH TWICE DAILY WITH FOOD 60 tablet 5    glimepiride (AMARYL) 1 MG tablet TAKE 1 TABLET BY MOUTH ONCE DAILY IN THE MORNING 30 tablet 5    allopurinol (ZYLOPRIM) 300 MG tablet Take 1 tablet by mouth daily 90 tablet 3    lactase (LACTAID) 3000 UNITS tablet Take 4 tablets by mouth 3 times daily (with meals)       clopidogrel (PLAVIX) 75 MG tablet TAKE 1 TABLET BY MOUTH ONCE A DAY 30 tablet 5    levothyroxine (SYNTHROID) 88 MCG tablet Take 88 mcg by mouth daily       ferrous sulfate 325 (65 FE) MG tablet Take 325 mg by mouth daily (with breakfast).  meclizine (ANTIVERT) 25 MG tablet Take 25 mg by mouth 3 times daily as needed.          [START ON 11/25/2017] pneumococcal polyvalent (PNEUMOVAX 23) inj 0.5 mL Once   allopurinol (ZYLOPRIM) tablet 100 mg Daily   amLODIPine (NORVASC) tablet 10 mg Daily   clopidogrel (PLAVIX) tablet 75 mg Daily   [START ON 11/23/2017] ferrous sulfate tablet 325 mg Daily with breakfast   gabapentin (NEURONTIN) capsule 100 mg TID   [START ON 11/23/2017] levothyroxine (SYNTHROID) tablet 88 mcg QAM AC   meclizine (ANTIVERT) tablet 25 mg TID PRN   atorvastatin (LIPITOR) tablet 20 mg Nightly   sodium chloride flush 0.9 % injection 10 mL 2 times per day   sodium chloride flush 0.9 % injection 10 mL PRN   acetaminophen (TYLENOL) tablet 650 mg Q4H PRN   magnesium hydroxide (MILK OF MAGNESIA) 400 MG/5ML suspension 30 mL Daily PRN   ondansetron (ZOFRAN) injection 4 mg Q6H PRN   famotidine (PEPCID) tablet 20 mg Nightly   cefTRIAXone (ROCEPHIN) 1 g in sterile water 10 mL IV syringe Q24H   And    azithromycin (ZITHROMAX) 500 mg in D5W 250ml addavial Q24H   glucose (GLUTOSE) 40 % oral gel 15 g PRN   dextrose 50 % solution 12.5 g PRN   glucagon (rDNA) injection 1 mg PRN   dextrose 5 % solution PRN   insulin lispro (HUMALOG) injection pen 0-12 Units TID WC   insulin lispro (HUMALOG) injection pen 0-6 Units Nightly   ipratropium-albuterol (DUONEB) nebulizer solution 1 ampule 4x daily   albuterol (PROVENTIL) nebulizer solution 2.5 mg Q4H PRN   heparin (porcine) injection 5,000 Units Q12H     FAMILY HISTORY: family history includes Heart Disease in her father; Stroke in her mother. PHYSICAL EXAM:   BP (!) 157/70   Pulse 71   Temp 98.3 °F (36.8 °C) (Temporal)   Resp 18   Ht 5' 1\" (1.549 m)   Wt 164 lb 4 oz (74.5 kg)   SpO2 91%   BMI 31.03 kg/m²  Body mass index is 31.03 kg/m². Constitutional: She is oriented to person, place, and time. She appears well-developed and well-nourished. In no acute distress. HEENT: Normocephalic and atraumatic. Severe JVD present. Carotid bruit is not present. No mass and no thyromegaly present. No lymphadenopathy present. Cardiovascular: Normal rate, regular rhythm, normal heart sounds and intact distal pulses. Exam reveals no gallop and no friction rub. A II/VI systolic murmur was heard at the apex of the heart without significant radiation. Pulmonary/Chest: Effort normal and breath sounds normal. No respiratory distress. Late expiratory wheezes and mild crackles present. She has rhonchi or rales. Abdominal: Mildly firm and tender consistent with possible moderate ascities. Bowel sounds and aorta are normal. She exhibits no organomegaly, mass or bruit.    Extremities: 1+ pitting pedal edema up to the knees

## 2017-11-22 NOTE — PROGRESS NOTES
t/o, or rales   [x]  Sporadic, scattered wheezes or rhonchi []  Persistentwheezes and, or absent BBS 3   Cough [x]  Strong, effective, & non-prod. []  Effective & prod. Less than 25 ml (2 TBSP) over past 24 hrs []  Ineffective & non-prod to less than 25 ML over past 24 hrs []  Ineffective and, or greater than 25 ml sputum prod. past 24 hrs. []  Nonspon- taneous; Requires suctioning 0   Pulmonary History  (PULM HX) []  No smoking and no chronic pulmonaryhistory []  Former smoker. Quit over 12 mos. ago []  Current smoker or quit w/ in 12 mos []  Pulm. History and, or 20 pk/yr smoking hx [x]  Admitted w/ acute pulm. dx and, or has been admitted w/ pulm. dx 2 or more times over past 12 mos 4   Surgical History this Admit  (SURG HX) [x]  No surgery []  General surgery []  Lower abdominal []  Thoracic or upper abdominal   []  Thoracic w/ pulm. disease 0   Chest X-Ray (CXR)/CT Scan []  Clear or not applicable []  Not available []  Atelect- asis or pleural effusions [x]  Localized infiltrate or pulm. edema []  Con-solidated Infiltrates, bilateral, or in more than 1 lobe 3   Slow or Forced VC, FEV1 OR PEFR (PULM FXN)  [x]  80% or greater, or not indicated []  Pt. unable to perform []  FEV1 or PEFR or VC 51-79%. []  FEV1 or PEFR or VC  30-49%   []  FEV1 or PEFR or VC less than 30%   0   TOTAL ACUITY: 12       CARE PLAN    If Acuity Level is 2, 3, or 4 in any of the following:    [x] BILATERAL BREATH SOUNDS (BBS)     [x] PULMONARY HISTORY (PULM HX)  [] PULMONARY FUNCTION (PULM FX)    Goal: Improve respiratory functions in patients with airway disease and decrease WOB    [x] AEROSOL PROTOCOL    Total Acuity:   16-32  []  Secondary Assessment in 24 hrs Total Acuity:  9-15  [x]  Secondary Assessment in 24 hrs Total Acuity:  4-8  []  Secondary Assessment in 48 hrs Total Acuity:  0-3  []  Secondary Assessment in 72 hrs   HHN AEROSOL THERAPY with  [physician-ordered bronchodilator(s)] q 4 & Albuterol PRN q2 hrs.    Breath-Actuated Neb if BBS Acuity = 4, and pt. can use MP. Notify physician if condition deteriorates. HHN AEROSOL THERAPY with  [physician-ordered bronchodilator(s)]  QID and Albuterol PRN q4 hrs. Breath-Actuated Neb if BBS Acuity = 4, and pt. can use MP. Notify physician if condition deteriorates. MDI THERAPY with  2 actuations of [physician-ordered bronchodilator(s)] via spacer TID Albuterol and PRNq4 hrs. If unable to utilize MDI: HHN [physician-ordered bronchodilator(s)] TID and Albuterol PRN q4 hrs. Notify physician if condition deteriorates. MDI THERAPY with  [physician-ordered bronchodilator(s)] via spacer TID PRN. If unable to utilize MDI: HHN [physician-ordered bronchodilator(s)] TID PRN. Notify physician if condition deteriorates. If Acuity Level is 2, 3, or 4 in any of the following:    [] COUGH     [] SURGICAL HISTORY (SURG HX)  [x] CHEST XRAY (CXR)    Goal: Improvement in sputum mobilization in patients with ineffective airway clearance. Reverse atelectasis. [x] Bronchopulmonary Hygiene Protocol    Total Acuity:   16-32  []  Secondary Assessment in 24 hrs Total Acuity:  9-15  [x]  Secondary Assessment in 24 hrs Total Acuity:  4-8  []  Secondary Assessment in 48 hrs Total Acuity:  0-3  []  Secondary Assessment in 72 hrs   METANEB QID with [physician-ordered bronchodilator(s)] if CXR Acuity = 4; otherwise:  PD&P, PEP, or Vest QID & PRN  NT Sxn PRN for ineffective cough  METANEB QID with [physician-ordered bronchodilator(s)] if CXR Acuity = 4; otherwise:  PD&P, PEP, or Vest TID & PRN  NT Sxn PRN for ineffective cough  Instruct patient to self-perform IS q1hr WA   Directed Cough self-performed q1hr WA     If Acuity Level is 2 or above in the following:    [x] PULMONARY HISTORY (PULM HX)  NEVER SMOKED  Goal: Assist patient in quitting smoking to slow or stop the progression of lung disease.     [] Smoking Cessation Protocol    SMOKING CESSATION EDUCATION provided according to policy [id number]: (yumi with

## 2017-11-23 LAB
ANION GAP SERPL CALCULATED.3IONS-SCNC: 15 MMOL/L (ref 9–17)
BUN BLDV-MCNC: 59 MG/DL (ref 8–23)
BUN/CREAT BLD: 23 (ref 9–20)
CALCIUM SERPL-MCNC: 8.6 MG/DL (ref 8.6–10.4)
CHLORIDE BLD-SCNC: 97 MMOL/L (ref 98–107)
CO2: 20 MMOL/L (ref 20–31)
CREAT SERPL-MCNC: 2.54 MG/DL (ref 0.5–0.9)
GFR AFRICAN AMERICAN: 21 ML/MIN
GFR NON-AFRICAN AMERICAN: 18 ML/MIN
GFR SERPL CREATININE-BSD FRML MDRD: ABNORMAL ML/MIN/{1.73_M2}
GFR SERPL CREATININE-BSD FRML MDRD: ABNORMAL ML/MIN/{1.73_M2}
GLUCOSE BLD-MCNC: 148 MG/DL (ref 74–100)
GLUCOSE BLD-MCNC: 232 MG/DL (ref 74–100)
GLUCOSE BLD-MCNC: 270 MG/DL (ref 74–100)
GLUCOSE BLD-MCNC: 80 MG/DL (ref 70–99)
GLUCOSE BLD-MCNC: 88 MG/DL (ref 74–100)
POTASSIUM SERPL-SCNC: 4.5 MMOL/L (ref 3.7–5.3)
SODIUM BLD-SCNC: 132 MMOL/L (ref 135–144)

## 2017-11-23 PROCEDURE — 94668 MNPJ CHEST WALL SBSQ: CPT

## 2017-11-23 PROCEDURE — 6370000000 HC RX 637 (ALT 250 FOR IP): Performed by: INTERNAL MEDICINE

## 2017-11-23 PROCEDURE — 80048 BASIC METABOLIC PNL TOTAL CA: CPT

## 2017-11-23 PROCEDURE — 97116 GAIT TRAINING THERAPY: CPT

## 2017-11-23 PROCEDURE — 1200000000 HC SEMI PRIVATE

## 2017-11-23 PROCEDURE — 94664 DEMO&/EVAL PT USE INHALER: CPT

## 2017-11-23 PROCEDURE — 82947 ASSAY GLUCOSE BLOOD QUANT: CPT

## 2017-11-23 PROCEDURE — 92526 ORAL FUNCTION THERAPY: CPT

## 2017-11-23 PROCEDURE — 94640 AIRWAY INHALATION TREATMENT: CPT

## 2017-11-23 PROCEDURE — 97530 THERAPEUTIC ACTIVITIES: CPT

## 2017-11-23 PROCEDURE — 6360000002 HC RX W HCPCS: Performed by: FAMILY MEDICINE

## 2017-11-23 PROCEDURE — 97110 THERAPEUTIC EXERCISES: CPT

## 2017-11-23 PROCEDURE — 2580000003 HC RX 258: Performed by: INTERNAL MEDICINE

## 2017-11-23 PROCEDURE — 6360000002 HC RX W HCPCS: Performed by: INTERNAL MEDICINE

## 2017-11-23 PROCEDURE — 36415 COLL VENOUS BLD VENIPUNCTURE: CPT

## 2017-11-23 RX ADMIN — GABAPENTIN 100 MG: 100 CAPSULE ORAL at 20:27

## 2017-11-23 RX ADMIN — IPRATROPIUM BROMIDE AND ALBUTEROL SULFATE 1 AMPULE: .5; 3 SOLUTION RESPIRATORY (INHALATION) at 16:05

## 2017-11-23 RX ADMIN — INSULIN LISPRO 2 UNITS: 100 INJECTION, SOLUTION INTRAVENOUS; SUBCUTANEOUS at 11:57

## 2017-11-23 RX ADMIN — FERROUS SULFATE TAB 325 MG (65 MG ELEMENTAL FE) 325 MG: 325 (65 FE) TAB at 07:12

## 2017-11-23 RX ADMIN — IPRATROPIUM BROMIDE AND ALBUTEROL SULFATE 1 AMPULE: .5; 3 SOLUTION RESPIRATORY (INHALATION) at 06:09

## 2017-11-23 RX ADMIN — INSULIN LISPRO 2 UNITS: 100 INJECTION, SOLUTION INTRAVENOUS; SUBCUTANEOUS at 20:27

## 2017-11-23 RX ADMIN — AMLODIPINE BESYLATE 10 MG: 10 TABLET ORAL at 08:45

## 2017-11-23 RX ADMIN — AZITHROMYCIN MONOHYDRATE 500 MG: 500 INJECTION, POWDER, LYOPHILIZED, FOR SOLUTION INTRAVENOUS at 13:21

## 2017-11-23 RX ADMIN — FUROSEMIDE 20 MG: 10 INJECTION, SOLUTION INTRAMUSCULAR; INTRAVENOUS at 00:27

## 2017-11-23 RX ADMIN — Medication 10 ML: at 08:45

## 2017-11-23 RX ADMIN — ALLOPURINOL 100 MG: 100 TABLET ORAL at 08:44

## 2017-11-23 RX ADMIN — IPRATROPIUM BROMIDE AND ALBUTEROL SULFATE 1 AMPULE: .5; 3 SOLUTION RESPIRATORY (INHALATION) at 11:00

## 2017-11-23 RX ADMIN — INSULIN LISPRO 6 UNITS: 100 INJECTION, SOLUTION INTRAVENOUS; SUBCUTANEOUS at 16:50

## 2017-11-23 RX ADMIN — CEFTRIAXONE 1 G: 1 INJECTION, POWDER, FOR SOLUTION INTRAMUSCULAR; INTRAVENOUS at 13:17

## 2017-11-23 RX ADMIN — CLOPIDOGREL BISULFATE 75 MG: 75 TABLET ORAL at 08:45

## 2017-11-23 RX ADMIN — FAMOTIDINE 20 MG: 20 TABLET, FILM COATED ORAL at 20:27

## 2017-11-23 RX ADMIN — IPRATROPIUM BROMIDE AND ALBUTEROL SULFATE 1 AMPULE: .5; 3 SOLUTION RESPIRATORY (INHALATION) at 20:01

## 2017-11-23 RX ADMIN — GABAPENTIN 100 MG: 100 CAPSULE ORAL at 13:19

## 2017-11-23 RX ADMIN — Medication 10 ML: at 20:26

## 2017-11-23 RX ADMIN — ACETAMINOPHEN 650 MG: 325 TABLET, FILM COATED ORAL at 18:54

## 2017-11-23 RX ADMIN — GABAPENTIN 100 MG: 100 CAPSULE ORAL at 08:44

## 2017-11-23 RX ADMIN — ATORVASTATIN CALCIUM 20 MG: 20 TABLET, FILM COATED ORAL at 20:27

## 2017-11-23 RX ADMIN — LEVOTHYROXINE SODIUM 88 MCG: 88 TABLET ORAL at 07:12

## 2017-11-23 RX ADMIN — HEPARIN SODIUM 5000 UNITS: 5000 INJECTION, SOLUTION INTRAVENOUS; SUBCUTANEOUS at 08:44

## 2017-11-23 RX ADMIN — HEPARIN SODIUM 5000 UNITS: 5000 INJECTION, SOLUTION INTRAVENOUS; SUBCUTANEOUS at 20:26

## 2017-11-23 ASSESSMENT — PAIN SCALES - GENERAL
PAINLEVEL_OUTOF10: 0
PAINLEVEL_OUTOF10: 0

## 2017-11-23 NOTE — PROGRESS NOTES
assistance  Ambulation  Ambulation?: Yes  Ambulation 1  Device: Rolling Walker  Other Apparatus: O2  Assistance: Contact guard assistance  Distance: 8 feet in room      Balance  Sitting - Static: Good  Sitting - Dynamic: Good  Standing - Static: Fair  Standing - Dynamic: Fair;-                           Assessment   Assessment: Gait in room 8 feet. Pt with SOB and fatigue noted. Pt put in prabha with nursing notified   Activity Tolerance  Activity Tolerance: Patient Tolerated treatment well       Discharge Recommendations:  Continue to assess pending progress    G-Code     OutComes Score                                                      Goals  Short term goals  Time Frame for Short term goals: 12 visits  Short term goal 1: Pt will initiate LE strengthening for ease of transfers. Short term goal 2: Pt will be independent with bed mob/transfers for functional independence. Short term goal 3: Pt will ambulate independently 100ft with RW without LOB for ease of household ambulation. Short term goal 4: Pt will tolerate 20-30mins ther ex/act to improve endurance for ADLs. Plan    Plan  Times per week: 7 days per week  Times per day: Twice a day (Daily on weekends)  Current Treatment Recommendations: Strengthening, Balance Training, Transfer Training, Gait Training, Endurance Training, Safety Education & Training, Home Exercise Program, Neuromuscular Re-education, IADL Training, Patient/Caregiver Education & Training, Stair training  Safety Devices  Type of devices:  All fall risk precautions in place, Nurse notified     Therapy Time   Individual Concurrent Group Co-treatment   Time In . Raghu Degroot 39         Time Out 0750         Minutes [address] [place] [address]

## 2017-11-23 NOTE — PROGRESS NOTES
Speech Language Pathology  Facility/Department: [facility] AT THE [facility] MED SURG   BEDSIDE SWALLOW Treatment    NAME: Micaela Calles  : 1925  MRN: [id number]    ADMISSION DATE: 2017  ADMITTING DIAGNOSIS: has Hypertension; Type 2 diabetes with nephropathy ([address] Hyperlipidemia; Left shoulder pain; Senile nuclear sclerosis; Malignant neoplasm of thyroid gland ([address] Renal failure; Calculus of kidney; Anemia; Transient cerebral ischemia; Asthma; Backache; Debility; Anemia; Chronic obstructive pulmonary disease ([address] Community acquired pneumonia of left lower lobe of lung ([address] Acute on chronic renal failure ([address] Acute hyperkalemia; Acute on chronic diastolic CHF (congestive heart failure), NYHA class 4 ([address] and Severe mitral regurgitation by prior echocardiogram on her problem list.    Date of Eval: 2017  Evaluating Therapist: Marylee Broccoli    Pain:  Pain Assessment  Patient Currently in Pain: Denies    Impression  Dysphagia Diagnosis: Mild oral stage dysphagia;Mild pharyngeal stage dysphagia  Dysphagia Outcome Severity Scale: Level 5: Mild dysphagia- Distant supervision. May need one diet consistency restricted     Treatment Plan  Requires SLP Intervention: Yes  Duration/Frequency of Treatment: Daily during inpatient stay   D/C Recommendations: To be determined       Recommended Diet and Intervention  Diet Solids Recommendation: Dysphagia III Advanced  Liquid Consistency Recommendation: Thin  Recommended Form of Meds: PO  Therapeutic Interventions: Patient/Family education, Therapeutic PO trials with SLP, Diet tolerance monitoring    Compensatory Swallowing Strategies  Compensatory Swallowing Strategies: Alternate solids and liquids;Small bites/sips;Upright as possible for all oral intake;Eat/Feed slowly; Remain upright for 30-45 minutes after meals;Assist feed (assistance as needed secondary to visual impairments)    Treatment/Goals  Short-term Goals  Timeframe for Short-term Goals: 7-10 days  Goal 1: Patient will tolerate thin liquid trials x10 with no overt throat clear, cough or change in phonation  Goal 2: Patient will tolerate regular solid trials x10 with no overt throat clear, cough or change in phonation   Dysphagia Goals: The patient will tolerate recommended diet without observed clinical signs of aspiration    Vision/Hearing  Vision  Vision: Impaired (pt stated she is legally blind)  Vision Exceptions: Legally blind    Oral Motor Deficits  Oral/Motor  Oral Motor: Within functional limits    Oral Phase Dysfunction  Oral Phase  Oral Phase: WFL  Oral Phase  Oral Phase - Comment: pt demonstrated mild slow mastication of meat     Indicators of Pharyngeal Phase Dysfunction   Pharyngeal Phase  Pharyngeal Phase: Exceptions  Indicators of Pharyngeal Phase Dysfunction  Cough - Immediate: Thin - cup  Pharyngeal Phase   Pharyngeal: cough with thin liquids via cup secondary to tremors and poor control of amount of intake    Prognosis  Prognosis  Prognosis for safe diet advancement: fair  Barriers to reach goals: age  Individuals consulted  Consulted and agree with results and recommendations: Patient;RN    Education  Patient Education: discussed use of straw to help control amount of liquid intake  Patient Education Response: Verbalizes understanding      Therapy Time  SLP Individual Minutes  Time In: 1155  Time Out: 1215  Minutes: 20     Pt was able to participate in lunch meal this date while  Upright in bed. Pt had trouble finding food and self feeding secondary to being legally blind thus required some assistance. Pt tolerated turkey dressing and mashed potatoes this date without overt s/s aspiration. Pt demonstrated mild delay in mastication but continues to be [facility]. Pt demonstrated immediate cough x2/2 on thin via cup as she has tremors which have become worse since Sunday. Pt has trouble mitigating how much liquids she consumed as a result of tremors.  This decreased and no coughing was observed

## 2017-11-23 NOTE — PROGRESS NOTES
4.5 11/23/2017    CALCIUM 8.6 11/23/2017    CL 97 (L) 11/23/2017    CO2 20 11/23/2017       ASSESSMENT:    Principal Problem:    Community acquired pneumonia of left lower lobe of lung     Active Problems:    Acute on chronic renal failure    Acute hyperkalemia - improved    Anemia of chronic kidney disease    Acute on chronic diastolic CHF (congestive heart failure), NYHA class 4     Moderate to severe aortic regurgitation by prior echocardiogram      PLAN:  · Continue current Abx therapy with IV Rocephin and Azithromycin  · Not on ACE due to acute on chronic renal disease  · Continue IV lasix for diuresis    Jocelin Yoo M.D.  11/23/2017  10:40 AM

## 2017-11-23 NOTE — PROGRESS NOTES
Occupational Therapy  Facility/Department: [facility] MED SURG  Daily Treatment Note  NAME: Marylee Sanders  : 1925  MRN: [id number]    Date of Service: 2017    Patient Diagnosis(es): There were no encounter diagnoses. has a past medical history of Headache(784.0); History of thyroid cancer; Hyperlipidemia; Hypertension; Macular degeneration; Osteoporosis; TIA (transient ischemic attack); and Type II or unspecified type diabetes mellitus without mention of complication, not stated as uncontrolled. has a past surgical history that includes Hysterectomy; Cholecystectomy; Spine surgery; Thyroidectomy; and Ankle surgery (Left). Restrictions  Restrictions/Precautions  Restrictions/Precautions: General Precautions, Fall Risk  Subjective   General  Chart Reviewed: Yes  Patient assessed for rehabilitation services?: Yes  Response to previous treatment: Patient with no complaints from previous session  Family / Caregiver Present: Yes  Pain Assessment  Patient Currently in Pain: No  Vital Signs  Patient Currently in Pain: No   Orientation     Objective                                                                Type of ROM/Therapeutic Exercise  Type of ROM/Therapeutic Exercise: Free weights  Comment: BUE ther ex x1# free weight x 10 reps to increase BUE strength for ADL tasks. Pt very fatigued this date and required several prolonged RBs                     Assessment      Activity Tolerance  Activity Tolerance: Patient limited by fatigue  Safety Devices  Type of devices: All fall risk precautions in place; Bed alarm in place;Call light within reach; Left in bed       Discharge Recommendations:  Continue to assess pending progress, Subacute/Skilled  [facility]  Times per day: Daily  Current Treatment Recommendations: Strengthening, Balance Training, Safety Education & Training, Self-Care / ADL, Patient/Caregiver Education & Training, Equipment Evaluation, Education, & procurement,

## 2017-11-24 LAB
GLUCOSE BLD-MCNC: 152 MG/DL (ref 74–100)
GLUCOSE BLD-MCNC: 154 MG/DL (ref 74–100)
GLUCOSE BLD-MCNC: 266 MG/DL (ref 74–100)
GLUCOSE BLD-MCNC: 276 MG/DL (ref 74–100)

## 2017-11-24 PROCEDURE — 94664 DEMO&/EVAL PT USE INHALER: CPT

## 2017-11-24 PROCEDURE — G8997 SWALLOW GOAL STATUS: HCPCS

## 2017-11-24 PROCEDURE — 97110 THERAPEUTIC EXERCISES: CPT

## 2017-11-24 PROCEDURE — 6370000000 HC RX 637 (ALT 250 FOR IP): Performed by: INTERNAL MEDICINE

## 2017-11-24 PROCEDURE — G8996 SWALLOW CURRENT STATUS: HCPCS

## 2017-11-24 PROCEDURE — 1200000000 HC SEMI PRIVATE

## 2017-11-24 PROCEDURE — 82947 ASSAY GLUCOSE BLOOD QUANT: CPT

## 2017-11-24 PROCEDURE — 94640 AIRWAY INHALATION TREATMENT: CPT

## 2017-11-24 PROCEDURE — 97116 GAIT TRAINING THERAPY: CPT

## 2017-11-24 PROCEDURE — 6360000002 HC RX W HCPCS: Performed by: INTERNAL MEDICINE

## 2017-11-24 PROCEDURE — 94668 MNPJ CHEST WALL SBSQ: CPT

## 2017-11-24 PROCEDURE — G8998 SWALLOW D/C STATUS: HCPCS

## 2017-11-24 PROCEDURE — 2580000003 HC RX 258: Performed by: INTERNAL MEDICINE

## 2017-11-24 PROCEDURE — 97535 SELF CARE MNGMENT TRAINING: CPT

## 2017-11-24 RX ORDER — CHOLECALCIFEROL (VITAMIN D3) 125 MCG
3000 CAPSULE ORAL
Status: DISCONTINUED | OUTPATIENT
Start: 2017-11-24 | End: 2017-11-27 | Stop reason: HOSPADM

## 2017-11-24 RX ORDER — MECLIZINE HYDROCHLORIDE 25 MG/1
25 TABLET ORAL DAILY
Status: DISCONTINUED | OUTPATIENT
Start: 2017-11-24 | End: 2017-11-27 | Stop reason: HOSPADM

## 2017-11-24 RX ORDER — IPRATROPIUM BROMIDE AND ALBUTEROL SULFATE 2.5; .5 MG/3ML; MG/3ML
SOLUTION RESPIRATORY (INHALATION)
Status: DISCONTINUED
Start: 2017-11-24 | End: 2017-11-25 | Stop reason: HOSPADM

## 2017-11-24 RX ADMIN — GABAPENTIN 100 MG: 100 CAPSULE ORAL at 09:10

## 2017-11-24 RX ADMIN — GABAPENTIN 100 MG: 100 CAPSULE ORAL at 20:15

## 2017-11-24 RX ADMIN — CEFTRIAXONE 1 G: 1 INJECTION, POWDER, FOR SOLUTION INTRAMUSCULAR; INTRAVENOUS at 13:49

## 2017-11-24 RX ADMIN — GABAPENTIN 100 MG: 100 CAPSULE ORAL at 13:49

## 2017-11-24 RX ADMIN — INSULIN LISPRO 6 UNITS: 100 INJECTION, SOLUTION INTRAVENOUS; SUBCUTANEOUS at 16:07

## 2017-11-24 RX ADMIN — ALLOPURINOL 100 MG: 100 TABLET ORAL at 09:10

## 2017-11-24 RX ADMIN — Medication 10 ML: at 20:16

## 2017-11-24 RX ADMIN — IPRATROPIUM BROMIDE AND ALBUTEROL SULFATE 1 AMPULE: .5; 3 SOLUTION RESPIRATORY (INHALATION) at 06:16

## 2017-11-24 RX ADMIN — AMLODIPINE BESYLATE 10 MG: 10 TABLET ORAL at 09:10

## 2017-11-24 RX ADMIN — CLOPIDOGREL BISULFATE 75 MG: 75 TABLET ORAL at 09:10

## 2017-11-24 RX ADMIN — INSULIN LISPRO 2 UNITS: 100 INJECTION, SOLUTION INTRAVENOUS; SUBCUTANEOUS at 08:02

## 2017-11-24 RX ADMIN — Medication 10 ML: at 09:11

## 2017-11-24 RX ADMIN — FERROUS SULFATE TAB 325 MG (65 MG ELEMENTAL FE) 325 MG: 325 (65 FE) TAB at 07:09

## 2017-11-24 RX ADMIN — IPRATROPIUM BROMIDE AND ALBUTEROL SULFATE 1 AMPULE: .5; 3 SOLUTION RESPIRATORY (INHALATION) at 16:52

## 2017-11-24 RX ADMIN — IPRATROPIUM BROMIDE AND ALBUTEROL SULFATE 1 AMPULE: .5; 3 SOLUTION RESPIRATORY (INHALATION) at 10:59

## 2017-11-24 RX ADMIN — HEPARIN SODIUM 5000 UNITS: 5000 INJECTION, SOLUTION INTRAVENOUS; SUBCUTANEOUS at 09:09

## 2017-11-24 RX ADMIN — HEPARIN SODIUM 5000 UNITS: 5000 INJECTION, SOLUTION INTRAVENOUS; SUBCUTANEOUS at 20:16

## 2017-11-24 RX ADMIN — Medication 3000 UNITS: at 17:14

## 2017-11-24 RX ADMIN — LEVOTHYROXINE SODIUM 88 MCG: 88 TABLET ORAL at 07:09

## 2017-11-24 RX ADMIN — INSULIN LISPRO 1 UNITS: 100 INJECTION, SOLUTION INTRAVENOUS; SUBCUTANEOUS at 20:18

## 2017-11-24 RX ADMIN — INSULIN LISPRO 6 UNITS: 100 INJECTION, SOLUTION INTRAVENOUS; SUBCUTANEOUS at 12:09

## 2017-11-24 RX ADMIN — FAMOTIDINE 20 MG: 20 TABLET, FILM COATED ORAL at 20:15

## 2017-11-24 RX ADMIN — ATORVASTATIN CALCIUM 20 MG: 20 TABLET, FILM COATED ORAL at 20:15

## 2017-11-24 RX ADMIN — IPRATROPIUM BROMIDE AND ALBUTEROL SULFATE 1 AMPULE: .5; 3 SOLUTION RESPIRATORY (INHALATION) at 20:41

## 2017-11-24 RX ADMIN — AZITHROMYCIN MONOHYDRATE 500 MG: 500 INJECTION, POWDER, LYOPHILIZED, FOR SOLUTION INTRAVENOUS at 12:28

## 2017-11-24 RX ADMIN — MECLIZINE HYDROCHLORIDE 25 MG: 25 TABLET ORAL at 17:16

## 2017-11-24 ASSESSMENT — PAIN SCALES - GENERAL
PAINLEVEL_OUTOF10: 0

## 2017-11-24 ASSESSMENT — PAIN - FUNCTIONAL ASSESSMENT: PAIN_FUNCTIONAL_ASSESSMENT: 0-10

## 2017-11-24 NOTE — PROGRESS NOTES
items are more cohesive, which may mitigate but not eliminate her ongoing risk. Following standard precautions are recommended:    -Slow rate  -Small bites/sips  -Remain upright for all PO intake  -Alternate liquids/solids  -Medications PO as able    No further skilled ST intervention indicated. ST will sign off. Available for re-consult if any change in clinical status.        [facility] MEDICO DEL [facility] MARTÍN VACA CCC-SLP  11/24/2017 11:34 AM

## 2017-11-24 NOTE — PROGRESS NOTES
Physical Therapy  Facility/Department: [facility] AT THE [facility] MED SURG  Daily Treatment Note  NAME: Tammy Ashby  : 1925  MRN: [id number]    Date of Service: 2017    Patient Diagnosis(es):   Patient Active Problem List    Diagnosis Date Noted    Community acquired pneumonia of left lower lobe of lung [address] 2017     Priority: High     Class: Acute    Acute on chronic renal failure ([address] 2017    Acute hyperkalemia 2017    Acute on chronic diastolic CHF (congestive heart failure), NYHA class 4 ([address]     Severe mitral regurgitation by prior echocardiogram     Chronic obstructive pulmonary disease ([address] 2017    Debility 2017    Anemia 2017    Malignant neoplasm of thyroid gland ([address] 2015    Renal failure 2015    Calculus of kidney 2015    Anemia 2015    Transient cerebral ischemia 2015    Asthma 2015    Backache 2015    Senile nuclear sclerosis 2015    Hypertension 2014    Type 2 diabetes with nephropathy ([address] 2014     Class: Chronic    Hyperlipidemia 2014    Left shoulder pain 2014       Past Medical History:   Diagnosis Date    Headache(784.0)     migraines    History of thyroid cancer     Hyperlipidemia     Hypertension     Macular degeneration     Osteoporosis     TIA (transient ischemic attack)     Type II or unspecified type diabetes mellitus without mention of complication, not stated as uncontrolled      Past Surgical History:   Procedure Laterality Date    ANKLE SURGERY Left     CHOLECYSTECTOMY      HYSTERECTOMY      SPINE SURGERY      THYROIDECTOMY         Restrictions  Restrictions/Precautions  Restrictions/Precautions: General Precautions, Fall Risk  Subjective   General  Chart Reviewed: Yes  Subjective  Subjective: Pt reported no pain.    Pain Screening  Patient Currently in Pain: Denies  Pain Assessment  Pain Assessment: 0-10  Pain Level: 0  Vital Signs  Patient Training, Home Exercise Program, Neuromuscular Re-education, IADL Training, Patient/Caregiver Education & Training, Stair training  Safety Devices  Type of devices:  All fall risk precautions in place, Nurse notified, Left in chair, Chair alarm in place, Call light within reach     Therapy Time   Individual Concurrent Group Co-treatment   Time In [phone]         Time Out 0730         Minutes [place], [place]

## 2017-11-24 NOTE — PROGRESS NOTES
Nate Triana M.D. Internal Medicine Progress Note 11/24/17    SUBJECTIVE:    Patient seen for f/u of Community acquired pneumonia of left lower lobe of lung ([address] She is still coughing quite a bit. Requiring supplemental O2 as well. Denies fever or chills. Denies chest pain or palpitations     ROS:   Constitutional: negative  for fevers, and negative for chills. Respiratory: positive for shortness of breath, positive for cough, and negative for wheezing  Cardiovascular: negative for chest pain, and negative for palpitations  Gastrointestinal: negative for abdominal pain, negative for nausea,negative for vomiting, negative for diarrhea, and negative for constipation     All other systems were reviewed with the patient and are negative unless otherwise stated in HPI    OBJECTIVE:  Vitals:   Temp: 98.1 °F (36.7 °C)  BP: (!) 151/63  Resp: 18  Pulse: 87  SpO2: 94 %    24HR INTAKE/OUTPUT:    Intake/Output Summary (Last 24 hours) at 11/24/17 0817  Last data filed at 11/24/17 0324   Gross per 24 hour   Intake              840 ml   Output              400 ml   Net              440 ml     -----------------------------------------------------------------  Exam:  GEN:    Awake, alert and oriented x 3. no acute distress  EYES:  EOMI, pupils equal   NECK: Supple. No lymphadenopathy. No carotid bruit  CVS:    RRR, no murmur  PULM:  mild rhonchi noted, no wheezing  ABD:    Bowels sounds normal.  Abdomen is soft. No distention. No tenderness. EXT:   2+ edema bilaterally . No calf tenderness. NEURO: Motor and sensory are intact  SKIN:  No rashes.   No skin lesions.    -----------------------------------------------------------------  Diagnostic Data:    · All available data reviewed  Lab Results   Component Value Date    WBC 10.5 11/22/2017    HGB 8.9 (L) 11/22/2017    MCV 84.5 11/22/2017     11/22/2017    Lab Results   Component Value Date    GLUCOSE 80 11/23/2017    BUN 59 (H) 11/23/2017    CREATININE 2.54 (H) 11/23/2017     (L) 11/23/2017    K 4.5 11/23/2017    CALCIUM 8.6 11/23/2017    CL 97 (L) 11/23/2017    CO2 20 11/23/2017       ASSESSMENT:    Principal Problem:    Community acquired pneumonia of left lower lobe of lung      Active Problems:    Acute on chronic renal failure    Acute hyperkalemia - resolved    Anemia of chronic kidney disease    Acute on chronic diastolic CHF (congestive heart failure), NYHA class 4     Moderate to severe aortic regurgitation by prior echocardiogram    PLAN:  · Continue current therapy  · Recheck BMP and CBC in CRISTOBAL Connor M.D.  11/24/2017  8:28 AM

## 2017-11-24 NOTE — PROGRESS NOTES
0  Vital Signs  Patient Currently in Pain: Denies       Orientation  Orientation  Overall Orientation Status: Within Functional Limits  Objective      Transfers  Sit to Stand: Contact guard assistance  Stand to sit: Contact guard assistance  Comment: Cues for hand placement. Ambulation  Ambulation?: Yes  Ambulation 1  Surface: level tile  Device: Rolling Walker  Other Apparatus: O2 (3L)  Assistance: Contact guard assistance  Distance: 40 feet x 1  Comments: Cues for posture/safety with AD. SPO2 78-88% after amb but increased to 93% after a short seated rest break. Stairs/Curb  Stairs?: No     Balance  Posture: Fair  Sitting - Static: Good  Sitting - Dynamic: Good  Standing - Static: Fair  Standing - Dynamic: Fair;-  Exercises  Hip Flexion: 15x  Hip Abduction: 15x  Knee Long Arc Quad: 15x  Ankle Pumps: 15x  Comments: Above exercises completed in sitting. Standing ther ex x 10-15 of: heel/toe raises, marching, and hip abd. Assessment      Patient Education: Educated pt on safety with transfers/amb. Pt with fair to good understanding. Pt also issued discharge folder and educated on contents. Pt verbalized good understanding. REQUIRES PT FOLLOW UP: Yes  Activity Tolerance  Activity Tolerance: Patient Tolerated treatment well;Patient limited by endurance       Discharge Recommendations:  Continue to assess pending progress    G-Code     OutComes Score    Goals  Short term goals  Time Frame for Short term goals: 12 visits  Short term goal 1: Pt will initiate LE strengthening for ease of transfers. Short term goal 2: Pt will be independent with bed mob/transfers for functional independence. Short term goal 3: Pt will ambulate independently 100ft with RW without LOB for ease of household ambulation. Short term goal 4: Pt will tolerate 20-30mins ther ex/act to improve endurance for ADLs.      Plan    Plan  Times per week: 7 days per week  Times per day: Twice a day (Daily on weekends)  Current Treatment

## 2017-11-24 NOTE — PROGRESS NOTES
RESPIRATORY ASSESSMENT PROTOCOL                                                                                              Patient Name: Gita Burn Room#: [id number] : 1925     Admitting diagnosis: CAP (community acquired pneumonia) due to Chlamydia species [J16.0]       Medical History:   Past Medical History:   Diagnosis Date    Headache(784.0)     migraines    History of thyroid cancer     Hyperlipidemia     Hypertension     Macular degeneration     Osteoporosis     TIA (transient ischemic attack)     Type II or unspecified type diabetes mellitus without mention of complication, not stated as uncontrolled        PATIENT ASSESSMENT    LABORATORY DATA  Hematology:   Lab Results   Component Value Date    WBC 10.5 2017    RBC 3.28 2017    RBC 0 2016    HGB 8.9 2017    HCT 27.7 2017     2017     10/26/2011     Chemistry:  No results found for: PHART, WNG8UUF, PO2ART, I0JSPRRB, KHE2WHK, PBEA    Blood Culture:   Sputum Culture:     VITALS  Pulse: 87   Resp: 18  BP: (!) 151/63  SpO2: 94 % O2 Device: Nasal cannula  Temp: 98.1 °F (36.7 °C)  Comment:   SKIN COLOR  [] Normal  [] Pale  [] Dusky  [] Cyanotic      RESPIRATORY PATTERN  [] Normal  [] Dyspnea  [] Cheyne-Collado  [] Kussmaul  [] Biots  AMBULATORY  [] Yes  [] No  [] With Assistance    PEAK FLOW  Predicted:     Personal Best:        VITAL CAPACITY  Predicted value:  ml  Actual Value:  ml  30% of Predicted:  ml  Patient Acuity 0 1 2 3 4 Score   Level of Concious (LOC) [x]  Alert & Oriented or Pt normal LOC []  Confused;follows directions []  Confused & uncooper-ative []  Obtunded []  Comatose 0   Respiratory Rate  (RR) []  Reg. rate & pattern. 12 - 20 bpm  []  Increased RR.  Greater than 20 bpm   [x]  SOB w/ exertion or RR greater than 24 bpm []  Access- ory muscle use at rest. Abn.  resp. []  SOB at rest.   2   Bilateral Breath Sounds (BBS) [x]  Clear []  Diminish-ed bases  []  Diminish-ed t/o, or rales   []  Sporadic, scattered wheezes or rhonchi []  Persistentwheezes and, or absent BBS 0   Cough [x]  Strong, effective, & non-prod. []  Effective & prod. Less than 25 ml (2 TBSP) over past 24 hrs []  Ineffective & non-prod to less than 25 ML over past 24 hrs []  Ineffective and, or greater than 25 ml sputum prod. past 24 hrs. []  Nonspon- taneous; Requires suctioning 0   Pulmonary History  (PULM HX) []  No smoking and no chronic pulmonaryhistory []  Former smoker. Quit over 12 mos. ago []  Current smoker or quit w/ in 12 mos []  Pulm. History and, or 20 pk/yr smoking hx [x]  Admitted w/ acute pulm. dx and, or has been admitted w/ pulm. dx 2 or more times over past 12 mos 4   Surgical History this Admit  (SURG HX) [x]  No surgery []  General surgery []  Lower abdominal []  Thoracic or upper abdominal   []  Thoracic w/ pulm. disease 0   Chest X-Ray (CXR)/CT Scan []  Clear or not applicable []  Not available []  Atelect- asis or pleural effusions [x]  Localized infiltrate or pulm. edema []  Con-solidated Infiltrates, bilateral, or in more than 1 lobe 3   Slow or Forced VC, FEV1 OR PEFR (PULM FXN)  [x]  80% or greater, or not indicated []  Pt. unable to perform []  FEV1 or PEFR or VC 51-79%. []  FEV1 or PEFR or VC  30-49%   []  FEV1 or PEFR or VC less than 30%   0   TOTAL ACUITY: 9       CARE PLAN    If Acuity Level is 2, 3, or 4 in any of the following:    [] BILATERAL BREATH SOUNDS (BBS)     [x] PULMONARY HISTORY (PULM HX)  [] PULMONARY FUNCTION (PULM FX)    Goal: Improve respiratory functions in patients with airway disease and decrease WOB    [x] AEROSOL PROTOCOL    Total Acuity:   16-32  []  Secondary Assessment in 24 hrs Total Acuity:  9-15  [x]  Secondary Assessment in 24 hrs Total Acuity:  4-8  []  Secondary Assessment in 48 hrs Total Acuity:  0-3  []  Secondary Assessment in 72 hrs   HHN AEROSOL THERAPY with  [physician-ordered bronchodilator(s)] q 4 & Albuterol PRN q2 hrs.    Breath-Actuated Neb if BBS Acuity = 4, and pt. can use MP. Notify physician if condition deteriorates. HHN AEROSOL THERAPY with  [physician-ordered bronchodilator(s)]  QID and Albuterol PRN q4 hrs. Breath-Actuated Neb if BBS Acuity = 4, and pt. can use MP. Notify physician if condition deteriorates. MDI THERAPY with  2 actuations of [physician-ordered bronchodilator(s)] via spacer TID Albuterol and PRNq4 hrs. If unable to utilize MDI: HHN [physician-ordered bronchodilator(s)] TID and Albuterol PRN q4 hrs. Notify physician if condition deteriorates. MDI THERAPY with  [physician-ordered bronchodilator(s)] via spacer TID PRN. If unable to utilize MDI: HHN [physician-ordered bronchodilator(s)] TID PRN. Notify physician if condition deteriorates. If Acuity Level is 2, 3, or 4 in any of the following:    [] COUGH     [] SURGICAL HISTORY (SURG HX)  [x] CHEST XRAY (CXR)    Goal: Improvement in sputum mobilization in patients with ineffective airway clearance. Reverse atelectasis. [x] Bronchopulmonary Hygiene Protocol    Total Acuity:   16-32  []  Secondary Assessment in 24 hrs Total Acuity:  9-15  [x]  Secondary Assessment in 24 hrs Total Acuity:  4-8  []  Secondary Assessment in 48 hrs Total Acuity:  0-3  []  Secondary Assessment in 72 hrs   METANEB QID with [physician-ordered bronchodilator(s)] if CXR Acuity = 4; otherwise:  PD&P, PEP, or Vest QID & PRN  NT Sxn PRN for ineffective cough  METANEB QID with [physician-ordered bronchodilator(s)] if CXR Acuity = 4; otherwise:  PD&P, PEP, or Vest TID & PRN  NT Sxn PRN for ineffective cough  Instruct patient to self-perform IS q1hr WA   Directed Cough self-performed q1hr WA     If Acuity Level is 2 or above in the following:    [] PULMONARY HISTORY (PULM HX)    Goal: Assist patient in quitting smoking to slow or stop the progression of lung disease.     [] Smoking Cessation Protocol    SMOKING CESSATION EDUCATION provided according to policy [id number]: (yumi with an X)  ____Yes

## 2017-11-24 NOTE — PROGRESS NOTES
Discussed discharge plans with the patient. Patient is a 80year old female here with Community acquired pneumonia of left lower lobe of lung . She is alert and oriented. Patient is a  and lives in the apartment on the [address] [place] [address] Road. She uses either a cane or a walker. Patient does lite cooking and cleaning. The family does the medication management and provides the transportation. Patient is legally blind. Her PCP is Dr. Sherif Tompkins. She has medical insurance that helps with medication costs. The discharge plan is home. Gave her written information to give to her family on agencies that can help with the cleaning and cooking. Patient does not want home health at this time.     FLY Nolasco

## 2017-11-25 LAB
GLUCOSE BLD-MCNC: 150 MG/DL (ref 74–100)
GLUCOSE BLD-MCNC: 196 MG/DL (ref 74–100)
GLUCOSE BLD-MCNC: 216 MG/DL (ref 74–100)
GLUCOSE BLD-MCNC: 216 MG/DL (ref 74–100)

## 2017-11-25 PROCEDURE — G0009 ADMIN PNEUMOCOCCAL VACCINE: HCPCS | Performed by: INTERNAL MEDICINE

## 2017-11-25 PROCEDURE — 90732 PPSV23 VACC 2 YRS+ SUBQ/IM: CPT | Performed by: INTERNAL MEDICINE

## 2017-11-25 PROCEDURE — 94640 AIRWAY INHALATION TREATMENT: CPT

## 2017-11-25 PROCEDURE — 2580000003 HC RX 258: Performed by: INTERNAL MEDICINE

## 2017-11-25 PROCEDURE — 97116 GAIT TRAINING THERAPY: CPT

## 2017-11-25 PROCEDURE — 94668 MNPJ CHEST WALL SBSQ: CPT

## 2017-11-25 PROCEDURE — 82947 ASSAY GLUCOSE BLOOD QUANT: CPT

## 2017-11-25 PROCEDURE — 97530 THERAPEUTIC ACTIVITIES: CPT

## 2017-11-25 PROCEDURE — 94664 DEMO&/EVAL PT USE INHALER: CPT

## 2017-11-25 PROCEDURE — 6370000000 HC RX 637 (ALT 250 FOR IP): Performed by: INTERNAL MEDICINE

## 2017-11-25 PROCEDURE — 6360000002 HC RX W HCPCS: Performed by: INTERNAL MEDICINE

## 2017-11-25 PROCEDURE — 1200000000 HC SEMI PRIVATE

## 2017-11-25 PROCEDURE — 97535 SELF CARE MNGMENT TRAINING: CPT

## 2017-11-25 RX ORDER — FUROSEMIDE 10 MG/ML
40 INJECTION INTRAMUSCULAR; INTRAVENOUS ONCE
Status: COMPLETED | OUTPATIENT
Start: 2017-11-25 | End: 2017-11-25

## 2017-11-25 RX ADMIN — Medication 3000 UNITS: at 09:22

## 2017-11-25 RX ADMIN — IPRATROPIUM BROMIDE AND ALBUTEROL SULFATE 1 AMPULE: .5; 3 SOLUTION RESPIRATORY (INHALATION) at 20:26

## 2017-11-25 RX ADMIN — IPRATROPIUM BROMIDE AND ALBUTEROL SULFATE 1 AMPULE: .5; 3 SOLUTION RESPIRATORY (INHALATION) at 10:09

## 2017-11-25 RX ADMIN — CLOPIDOGREL BISULFATE 75 MG: 75 TABLET ORAL at 08:38

## 2017-11-25 RX ADMIN — Medication 10 ML: at 20:19

## 2017-11-25 RX ADMIN — IPRATROPIUM BROMIDE AND ALBUTEROL SULFATE 1 AMPULE: .5; 3 SOLUTION RESPIRATORY (INHALATION) at 05:47

## 2017-11-25 RX ADMIN — FUROSEMIDE 40 MG: 10 INJECTION, SOLUTION INTRAMUSCULAR; INTRAVENOUS at 13:59

## 2017-11-25 RX ADMIN — FAMOTIDINE 20 MG: 20 TABLET, FILM COATED ORAL at 20:16

## 2017-11-25 RX ADMIN — GABAPENTIN 100 MG: 100 CAPSULE ORAL at 08:38

## 2017-11-25 RX ADMIN — FERROUS SULFATE TAB 325 MG (65 MG ELEMENTAL FE) 325 MG: 325 (65 FE) TAB at 08:38

## 2017-11-25 RX ADMIN — CEFTRIAXONE 1 G: 1 INJECTION, POWDER, FOR SOLUTION INTRAMUSCULAR; INTRAVENOUS at 13:59

## 2017-11-25 RX ADMIN — AZITHROMYCIN MONOHYDRATE 500 MG: 500 INJECTION, POWDER, LYOPHILIZED, FOR SOLUTION INTRAVENOUS at 14:09

## 2017-11-25 RX ADMIN — HEPARIN SODIUM 5000 UNITS: 5000 INJECTION, SOLUTION INTRAVENOUS; SUBCUTANEOUS at 08:12

## 2017-11-25 RX ADMIN — AMLODIPINE BESYLATE 10 MG: 10 TABLET ORAL at 08:38

## 2017-11-25 RX ADMIN — INSULIN LISPRO 4 UNITS: 100 INJECTION, SOLUTION INTRAVENOUS; SUBCUTANEOUS at 11:54

## 2017-11-25 RX ADMIN — INSULIN LISPRO 1 UNITS: 100 INJECTION, SOLUTION INTRAVENOUS; SUBCUTANEOUS at 20:17

## 2017-11-25 RX ADMIN — LEVOTHYROXINE SODIUM 88 MCG: 88 TABLET ORAL at 07:08

## 2017-11-25 RX ADMIN — Medication 10 ML: at 14:00

## 2017-11-25 RX ADMIN — GABAPENTIN 100 MG: 100 CAPSULE ORAL at 20:16

## 2017-11-25 RX ADMIN — ATORVASTATIN CALCIUM 20 MG: 20 TABLET, FILM COATED ORAL at 20:16

## 2017-11-25 RX ADMIN — Medication 3000 UNITS: at 11:55

## 2017-11-25 RX ADMIN — GABAPENTIN 100 MG: 100 CAPSULE ORAL at 14:00

## 2017-11-25 RX ADMIN — INSULIN LISPRO 2 UNITS: 100 INJECTION, SOLUTION INTRAVENOUS; SUBCUTANEOUS at 08:11

## 2017-11-25 RX ADMIN — Medication 10 ML: at 15:25

## 2017-11-25 RX ADMIN — HEPARIN SODIUM 5000 UNITS: 5000 INJECTION, SOLUTION INTRAVENOUS; SUBCUTANEOUS at 20:17

## 2017-11-25 RX ADMIN — Medication 10 ML: at 08:38

## 2017-11-25 RX ADMIN — ALLOPURINOL 100 MG: 100 TABLET ORAL at 08:38

## 2017-11-25 RX ADMIN — INSULIN LISPRO 4 UNITS: 100 INJECTION, SOLUTION INTRAVENOUS; SUBCUTANEOUS at 17:11

## 2017-11-25 RX ADMIN — IPRATROPIUM BROMIDE AND ALBUTEROL SULFATE 1 AMPULE: .5; 3 SOLUTION RESPIRATORY (INHALATION) at 15:53

## 2017-11-25 RX ADMIN — Medication 3000 UNITS: at 17:11

## 2017-11-25 RX ADMIN — MECLIZINE HYDROCHLORIDE 25 MG: 25 TABLET ORAL at 08:38

## 2017-11-25 RX ADMIN — PNEUMOCOCCAL VACCINE POLYVALENT 0.5 ML
25; 25; 25; 25; 25; 25; 25; 25; 25; 25; 25; 25; 25; 25; 25; 25; 25; 25; 25; 25; 25; 25; 25 INJECTION, SOLUTION INTRAMUSCULAR; SUBCUTANEOUS at 08:38

## 2017-11-25 ASSESSMENT — PAIN SCALES - GENERAL: PAINLEVEL_OUTOF10: 0

## 2017-11-25 NOTE — PROGRESS NOTES
Physical Therapy  Facility/Department: [facility] AT THE [facility] MED SURG  Daily Treatment Note  NAME: Demond Colon  : 1925  MRN: [id number]    Date of Service: 2017    Patient Diagnosis(es):   Patient Active Problem List    Diagnosis Date Noted    Community acquired pneumonia of left lower lobe of lung ([facility] [address] 2017     Priority: High     Class: Acute    Acute on chronic renal failure ([address] 2017    Acute hyperkalemia 2017    Acute on chronic diastolic CHF (congestive heart failure), NYHA class 4 ([address]     Severe mitral regurgitation by prior echocardiogram     Chronic obstructive pulmonary disease ([address] 2017    Debility 2017    Anemia 2017    Malignant neoplasm of thyroid gland ([address] 2015    Renal failure 2015    Calculus of kidney 2015    Anemia 2015    Transient cerebral ischemia 2015    Asthma 2015    Backache 2015    Senile nuclear sclerosis 2015    Hypertension 2014    Type 2 diabetes with nephropathy ([address] 2014     Class: Chronic    Hyperlipidemia 2014    Left shoulder pain 2014       Past Medical History:   Diagnosis Date    Headache(784.0)     migraines    History of thyroid cancer     Hyperlipidemia     Hypertension     Macular degeneration     Osteoporosis     TIA (transient ischemic attack)     Type II or unspecified type diabetes mellitus without mention of complication, not stated as uncontrolled      Past Surgical History:   Procedure Laterality Date    ANKLE SURGERY Left     CHOLECYSTECTOMY      HYSTERECTOMY      SPINE SURGERY      THYROIDECTOMY         Restrictions  Restrictions/Precautions  Restrictions/Precautions: General Precautions, Fall Risk  Subjective   General  Chart Reviewed: Yes  Subjective  Subjective: No new complaints this morning, pt is feeling pretty good  Pain Screening  Patient Currently in Pain: Denies  Vital Signs  Patient Currently in Pain: Therapy Time   Individual Concurrent Group Co-treatment   Time In 0704         Time Out 0721         Minutes 17                 Mo Jiang

## 2017-11-25 NOTE — PROGRESS NOTES
bases  []  Diminish-ed t/o, or rales   [x]  Sporadic, scattered wheezes or rhonchi []  Persistentwheezes and, or absent BBS 3   Cough [x]  Strong, effective, & non-prod. []  Effective & prod. Less than 25 ml (2 TBSP) over past 24 hrs []  Ineffective & non-prod to less than 25 ML over past 24 hrs []  Ineffective and, or greater than 25 ml sputum prod. past 24 hrs. []  Nonspon- taneous; Requires suctioning 0   Pulmonary History  (PULM HX) []  No smoking and no chronic pulmonaryhistory []  Former smoker. Quit over 12 mos. ago []  Current smoker or quit w/ in 12 mos []  Pulm. History and, or 20 pk/yr smoking hx [x]  Admitted w/ acute pulm. dx and, or has been admitted w/ pulm. dx 2 or more times over past 12 mos 4   Surgical History this Admit  (SURG HX) [x]  No surgery []  General surgery []  Lower abdominal []  Thoracic or upper abdominal   []  Thoracic w/ pulm. disease 0   Chest X-Ray (CXR)/CT Scan []  Clear or not applicable []  Not available []  Atelect- asis or pleural effusions [x]  Localized infiltrate or pulm. edema []  Con-solidated Infiltrates, bilateral, or in more than 1 lobe 3   Slow or Forced VC, FEV1 OR PEFR (PULM FXN)  [x]  80% or greater, or not indicated []  Pt. unable to perform []  FEV1 or PEFR or VC 51-79%.   []  FEV1 or PEFR or VC  30-49%   []  FEV1 or PEFR or VC less than 30%   0   TOTAL ACUITY: 12       CARE PLAN    If Acuity Level is 2, 3, or 4 in any of the following:    [x] BILATERAL BREATH SOUNDS (BBS)     [x] PULMONARY HISTORY (PULM HX)  [] PULMONARY FUNCTION (PULM FX)    Goal: Improve respiratory functions in patients with airway disease and decrease WOB    [x] AEROSOL PROTOCOL    Total Acuity:   16-32  []  Secondary Assessment in 24 hrs Total Acuity:  9-15  [x]  Secondary Assessment in 24 hrs Total Acuity:  4-8  []  Secondary Assessment in 48 hrs Total Acuity:  0-3  []  Secondary Assessment in 72 hrs   HHN AEROSOL THERAPY with  [physician-ordered bronchodilator(s)] q 4 & Albuterol PRN q2

## 2017-11-25 NOTE — PROGRESS NOTES
Claudell Hooks M.D. Internal Medicine Progress Note 11/25/17    SUBJECTIVE:    Patient seen for f/u of Community acquired pneumonia of left lower lobe of lung ([address] She is still coughing. C/o edema in legs today. Denies fever or chills. ROS:   Constitutional: negative  for fevers, and negative for chills. Respiratory: positive for shortness of breath, positive for cough, and negative for wheezing  Cardiovascular: negative for chest pain, and negative for palpitations  Gastrointestinal: negative for abdominal pain, negative for nausea,negative for vomiting, negative for diarrhea, and negative for constipation     All other systems were reviewed with the patient and are negative unless otherwise stated in HPI    OBJECTIVE:  Vitals:   Temp: 97.8 °F (36.6 °C)  BP: (!) 149/75  Resp: 20  Pulse: 78  SpO2: 96 %    24HR INTAKE/OUTPUT:    Intake/Output Summary (Last 24 hours) at 11/25/17 0909  Last data filed at 11/24/17 1900   Gross per 24 hour   Intake              350 ml   Output              200 ml   Net              150 ml     -----------------------------------------------------------------  Exam:  GEN:    Awake, alert and oriented x 3. no acute distress  EYES:  EOMI, pupils equal   NECK: Supple. No lymphadenopathy. No carotid bruit  CVS:    RRR, no murmur, rub or gallop  PULM:  Few scattered rhonchi, no wheezing  ABD:    Bowels sounds normal.  Abdomen is soft. No distention. No tenderness. EXT:   2+ edema bilaterally . No calf tenderness. NEURO: Motor and sensory are intact  SKIN:  No rashes.   No skin lesions.    -----------------------------------------------------------------  Diagnostic Data:    · All available data reviewed  Lab Results   Component Value Date    WBC 10.5 11/22/2017    HGB 8.9 (L) 11/22/2017    MCV 84.5 11/22/2017     11/22/2017    Lab Results   Component Value Date    GLUCOSE 80 11/23/2017    BUN 59 (H) 11/23/2017    CREATININE 2.54 (H) 11/23/2017     (L) 11/23/2017    K 4.5 11/23/2017    CALCIUM 8.6 11/23/2017    CL 97 (L) 11/23/2017    CO2 20 11/23/2017       ASSESSMENT:    Principal Problem:    Community acquired pneumonia of left lower lobe of lung      Active Problems:    Acute on chronic renal failure    Acute hyperkalemia - resolved    Anemia of chronic kidney disease    Acute on chronic diastolic CHF (congestive heart failure), NYHA class 4     Moderate to severe aortic regurgitation by prior echocardiogram    PLAN:  · Continue current therapy  · Repeat BMP, CBC tomorrow  · Lock IV due to edema    Dayan Garcia M.D.  11/25/2017  9:09 AM

## 2017-11-26 LAB
ABSOLUTE EOS #: 0.7 K/UL (ref 0–0.4)
ABSOLUTE IMMATURE GRANULOCYTE: ABNORMAL K/UL (ref 0–0.3)
ABSOLUTE LYMPH #: 1 K/UL (ref 1–4.8)
ABSOLUTE MONO #: 0.2 K/UL (ref 0–1)
ANION GAP SERPL CALCULATED.3IONS-SCNC: 14 MMOL/L (ref 9–17)
BASOPHILS # BLD: 0 % (ref 0–2)
BASOPHILS ABSOLUTE: 0 K/UL (ref 0–0.2)
BUN BLDV-MCNC: 58 MG/DL (ref 8–23)
BUN/CREAT BLD: 26 (ref 9–20)
CALCIUM SERPL-MCNC: 8.9 MG/DL (ref 8.6–10.4)
CHLORIDE BLD-SCNC: 99 MMOL/L (ref 98–107)
CO2: 23 MMOL/L (ref 20–31)
CREAT SERPL-MCNC: 2.26 MG/DL (ref 0.5–0.9)
DIFFERENTIAL TYPE: ABNORMAL
EOSINOPHILS RELATIVE PERCENT: 11 % (ref 0–8)
GFR AFRICAN AMERICAN: 25 ML/MIN
GFR NON-AFRICAN AMERICAN: 20 ML/MIN
GFR SERPL CREATININE-BSD FRML MDRD: ABNORMAL ML/MIN/{1.73_M2}
GFR SERPL CREATININE-BSD FRML MDRD: ABNORMAL ML/MIN/{1.73_M2}
GLUCOSE BLD-MCNC: 116 MG/DL (ref 74–100)
GLUCOSE BLD-MCNC: 137 MG/DL (ref 74–100)
GLUCOSE BLD-MCNC: 138 MG/DL (ref 70–99)
GLUCOSE BLD-MCNC: 167 MG/DL (ref 74–100)
GLUCOSE BLD-MCNC: 199 MG/DL (ref 74–100)
HCT VFR BLD CALC: 26.2 % (ref 36–46)
HEMOGLOBIN: 8.4 G/DL (ref 12–16)
IMMATURE GRANULOCYTES: ABNORMAL %
LYMPHOCYTES # BLD: 16 % (ref 24–44)
MCH RBC QN AUTO: 27.2 PG (ref 26–34)
MCHC RBC AUTO-ENTMCNC: 32 G/DL (ref 31–37)
MCV RBC AUTO: 84.9 FL (ref 80–100)
MONOCYTES # BLD: 4 % (ref 0–12)
PDW BLD-RTO: 17.4 % (ref 12.1–15.2)
PLATELET # BLD: 255 K/UL (ref 140–450)
PLATELET ESTIMATE: ABNORMAL
PMV BLD AUTO: 10.3 FL (ref 6–12)
POTASSIUM SERPL-SCNC: 4.8 MMOL/L (ref 3.7–5.3)
RBC # BLD: 3.08 M/UL (ref 4–5.2)
RBC # BLD: ABNORMAL 10*6/UL
SEG NEUTROPHILS: 69 % (ref 36–66)
SEGMENTED NEUTROPHILS ABSOLUTE COUNT: 4.3 K/UL (ref 1.8–7.7)
SODIUM BLD-SCNC: 136 MMOL/L (ref 135–144)
WBC # BLD: 6.3 K/UL (ref 3.5–11)
WBC # BLD: ABNORMAL 10*3/UL

## 2017-11-26 PROCEDURE — 97110 THERAPEUTIC EXERCISES: CPT

## 2017-11-26 PROCEDURE — 94664 DEMO&/EVAL PT USE INHALER: CPT

## 2017-11-26 PROCEDURE — 6370000000 HC RX 637 (ALT 250 FOR IP): Performed by: INTERNAL MEDICINE

## 2017-11-26 PROCEDURE — 1200000000 HC SEMI PRIVATE

## 2017-11-26 PROCEDURE — 97116 GAIT TRAINING THERAPY: CPT

## 2017-11-26 PROCEDURE — 82947 ASSAY GLUCOSE BLOOD QUANT: CPT

## 2017-11-26 PROCEDURE — 85025 COMPLETE CBC W/AUTO DIFF WBC: CPT

## 2017-11-26 PROCEDURE — 36415 COLL VENOUS BLD VENIPUNCTURE: CPT

## 2017-11-26 PROCEDURE — 2580000003 HC RX 258: Performed by: INTERNAL MEDICINE

## 2017-11-26 PROCEDURE — 80048 BASIC METABOLIC PNL TOTAL CA: CPT

## 2017-11-26 PROCEDURE — 6360000002 HC RX W HCPCS: Performed by: INTERNAL MEDICINE

## 2017-11-26 PROCEDURE — 94668 MNPJ CHEST WALL SBSQ: CPT

## 2017-11-26 PROCEDURE — 94640 AIRWAY INHALATION TREATMENT: CPT

## 2017-11-26 RX ORDER — FUROSEMIDE 10 MG/ML
40 INJECTION INTRAMUSCULAR; INTRAVENOUS ONCE
Status: COMPLETED | OUTPATIENT
Start: 2017-11-26 | End: 2017-11-26

## 2017-11-26 RX ORDER — BENZONATATE 100 MG/1
100 CAPSULE ORAL 3 TIMES DAILY PRN
Status: DISCONTINUED | OUTPATIENT
Start: 2017-11-26 | End: 2017-11-27 | Stop reason: HOSPADM

## 2017-11-26 RX ADMIN — INSULIN LISPRO 2 UNITS: 100 INJECTION, SOLUTION INTRAVENOUS; SUBCUTANEOUS at 16:35

## 2017-11-26 RX ADMIN — Medication 3000 UNITS: at 08:46

## 2017-11-26 RX ADMIN — FERROUS SULFATE TAB 325 MG (65 MG ELEMENTAL FE) 325 MG: 325 (65 FE) TAB at 08:45

## 2017-11-26 RX ADMIN — CLOPIDOGREL BISULFATE 75 MG: 75 TABLET ORAL at 08:45

## 2017-11-26 RX ADMIN — Medication 10 ML: at 20:47

## 2017-11-26 RX ADMIN — LEVOTHYROXINE SODIUM 88 MCG: 88 TABLET ORAL at 08:49

## 2017-11-26 RX ADMIN — HEPARIN SODIUM 5000 UNITS: 5000 INJECTION, SOLUTION INTRAVENOUS; SUBCUTANEOUS at 08:45

## 2017-11-26 RX ADMIN — AZITHROMYCIN MONOHYDRATE 500 MG: 500 INJECTION, POWDER, LYOPHILIZED, FOR SOLUTION INTRAVENOUS at 13:34

## 2017-11-26 RX ADMIN — Medication 3000 UNITS: at 12:16

## 2017-11-26 RX ADMIN — MAGNESIUM HYDROXIDE 30 ML: 400 SUSPENSION ORAL at 17:08

## 2017-11-26 RX ADMIN — AMLODIPINE BESYLATE 10 MG: 10 TABLET ORAL at 08:45

## 2017-11-26 RX ADMIN — ALLOPURINOL 100 MG: 100 TABLET ORAL at 08:45

## 2017-11-26 RX ADMIN — HEPARIN SODIUM 5000 UNITS: 5000 INJECTION, SOLUTION INTRAVENOUS; SUBCUTANEOUS at 20:47

## 2017-11-26 RX ADMIN — ATORVASTATIN CALCIUM 20 MG: 20 TABLET, FILM COATED ORAL at 20:47

## 2017-11-26 RX ADMIN — IPRATROPIUM BROMIDE AND ALBUTEROL SULFATE 1 AMPULE: .5; 3 SOLUTION RESPIRATORY (INHALATION) at 15:48

## 2017-11-26 RX ADMIN — GABAPENTIN 100 MG: 100 CAPSULE ORAL at 20:47

## 2017-11-26 RX ADMIN — INSULIN LISPRO 2 UNITS: 100 INJECTION, SOLUTION INTRAVENOUS; SUBCUTANEOUS at 12:13

## 2017-11-26 RX ADMIN — IPRATROPIUM BROMIDE AND ALBUTEROL SULFATE 1 AMPULE: .5; 3 SOLUTION RESPIRATORY (INHALATION) at 06:00

## 2017-11-26 RX ADMIN — GABAPENTIN 100 MG: 100 CAPSULE ORAL at 08:45

## 2017-11-26 RX ADMIN — Medication 10 ML: at 08:46

## 2017-11-26 RX ADMIN — GABAPENTIN 100 MG: 100 CAPSULE ORAL at 13:22

## 2017-11-26 RX ADMIN — CEFTRIAXONE 1 G: 1 INJECTION, POWDER, FOR SOLUTION INTRAMUSCULAR; INTRAVENOUS at 13:26

## 2017-11-26 RX ADMIN — IPRATROPIUM BROMIDE AND ALBUTEROL SULFATE 1 AMPULE: .5; 3 SOLUTION RESPIRATORY (INHALATION) at 19:51

## 2017-11-26 RX ADMIN — IPRATROPIUM BROMIDE AND ALBUTEROL SULFATE 1 AMPULE: .5; 3 SOLUTION RESPIRATORY (INHALATION) at 10:56

## 2017-11-26 RX ADMIN — BENZONATATE 100 MG: 100 CAPSULE ORAL at 13:35

## 2017-11-26 RX ADMIN — FAMOTIDINE 20 MG: 20 TABLET, FILM COATED ORAL at 20:47

## 2017-11-26 RX ADMIN — MECLIZINE HYDROCHLORIDE 25 MG: 25 TABLET ORAL at 08:45

## 2017-11-26 RX ADMIN — Medication 3000 UNITS: at 17:07

## 2017-11-26 RX ADMIN — FUROSEMIDE 40 MG: 10 INJECTION, SOLUTION INTRAMUSCULAR; INTRAVENOUS at 13:22

## 2017-11-26 ASSESSMENT — PAIN SCALES - GENERAL
PAINLEVEL_OUTOF10: 0

## 2017-11-26 NOTE — PROGRESS NOTES
uSe Valladares M.D. Internal Medicine Progress Note 11/26/17    SUBJECTIVE:    Patient seen for f/u of Community acquired pneumonia of left lower lobe of lung ([address] She is still coughing a little but SOB is much better. Denies fevers or chills. Last BM was 3 days ago but no abd pain or nausea. ROS:   Constitutional: negative  for fevers, and negative for chills. Respiratory: negative for shortness of breath, positive for cough, and negative for wheezing  Cardiovascular: negative for chest pain, and negative for palpitations  Gastrointestinal: negative for abdominal pain, negative for nausea,negative for vomiting, negative for diarrhea, and negative for constipation     All other systems were reviewed with the patient and are negative unless otherwise stated in HPI    OBJECTIVE:  Vitals:   Temp: 98.1 °F (36.7 °C)  BP: 132/61  Resp: 18  Pulse: 86  SpO2: 95 %    24HR INTAKE/OUTPUT:    Intake/Output Summary (Last 24 hours) at 11/26/17 1217  Last data filed at 11/26/17 1035   Gross per 24 hour   Intake              840 ml   Output             1000 ml   Net             -160 ml     -----------------------------------------------------------------  Exam:  GEN:    Awake, alert and oriented x 3. no acute distress  EYES:  EOMI, pupils equal   NECK: Supple. No lymphadenopathy. No carotid bruit  CVS:    RRR, no murmur, rub or gallop  PULM:  Still has few scattered rhonchi but no wheezing  ABD:    Bowels sounds normal.  Abdomen is soft. No distention. No tenderness. EXT:   2+ edema bilaterally . MAGNO hose in place. No calf tenderness. NEURO: Motor and sensory are intact  SKIN:  No rashes.   No skin lesions.    -----------------------------------------------------------------  Diagnostic Data:    · All available data reviewed  Lab Results   Component Value Date    WBC 6.3 11/26/2017    HGB 8.4 (L) 11/26/2017    MCV 84.9 11/26/2017     11/26/2017    Lab Results   Component Value Date    GLUCOSE 138 (H) 11/26/2017    BUN 58 (H) 11/26/2017    CREATININE 2.26 (H) 11/26/2017     11/26/2017    K 4.8 11/26/2017    CALCIUM 8.9 11/26/2017    CL 99 11/26/2017    CO2 23 11/26/2017       ASSESSMENT:    Principal Problem:    Community acquired pneumonia of left lower lobe of lung      Active Problems:    Acute on chronic renal failure - improving slightly    Chronic kidney disease stage 3 at baseline    Acute on chronic diastolic CHF (congestive heart failure), NYHA class 4     Acute hyperkalemia - resolved    Anemia of chronic kidney disease    Moderate to severe aortic regurgitation by prior echocardiogram    PLAN:  · Continue current therapy  · Will reorder Lasix for today due to edema    Duke Patino M.D.  11/26/2017  12:17 PM

## 2017-11-26 NOTE — PROGRESS NOTES
Recommendations:  Continue to assess pending progress, Subacute/Skilled Nursing Facility     Plan   Plan  Times per day: Daily  Current Treatment Recommendations: Strengthening, Balance Training, Safety Education & Training, Self-Care / ADL, Patient/Caregiver Education & Training, Equipment Evaluation, Education, & procurement, Home Management Training  G-Code     OutComes Score                                             Goals  Short term goals  Time Frame for Short term goals: 3-5 days  Short term goal 1: Patient to complete LB bathing/dressing with use of AE/DME PRN with SBA to improve indepenence during ADL's. Short term goal 2: Patient to complete toileting tasks c SUP to improve independence and safety during ADL's. Short term goal 3: Patient to engage in 10 minutes of ther ex/ther act to improve UE/UB strength and activity tolerance for ADL's, transfers and mobility. Short term goal 4: Patient to state 5 EC/WS techniques to implement throughout daily tasks.         Therapy Time   Individual Concurrent Group Co-treatment   Time In 1400         Time Out 169 REBECA Shah

## 2017-11-26 NOTE — PROGRESS NOTES
RESPIRATORY ASSESSMENT PROTOCOL                                                                                              Patient Name: Rojelio Moon Room#: [id number]/[id number] : 1925     Admitting diagnosis: CAP (community acquired pneumonia) due to Chlamydia species [J16.0]       Medical History:   Past Medical History:   Diagnosis Date    Headache(784.0)     migraines    History of thyroid cancer     Hyperlipidemia     Hypertension     Macular degeneration     Osteoporosis     TIA (transient ischemic attack)     Type II or unspecified type diabetes mellitus without mention of complication, not stated as uncontrolled        PATIENT ASSESSMENT    LABORATORY DATA  Hematology:   Lab Results   Component Value Date    WBC 10.5 2017    RBC 3.28 2017    RBC 0 2016    HGB 8.9 2017    HCT 27.7 2017     2017     10/26/2011     Chemistry:  No results found for: PHART, BNQ4NRH, PO2ART, [id number], JAA9IOL, PBEA    Blood Culture:   Sputum Culture:     VITALS  Pulse: 78   Resp: 20  BP: 135/62  SpO2: 94 % (02 at 1.5 lpm NC) O2 Device: Nasal cannula  Temp: 97.9 °F (36.6 °C)  Comment:     SKIN COLOR  [x] Normal  [] Pale  [] Dusky  [] Cyanotic      RESPIRATORY PATTERN  [] Normal  [x] Dyspnea  [] Cheyne-Collado  [] Kussmaul  [] Biots    AMBULATORY  [] Yes  [] No  [x] With Assistance    PEAK FLOW  Predicted:     Personal Best:        VITAL CAPACITY  Predicted value:  ml  Actual Value:  ml  30% of Predicted:  Ml    Patient Acuity 0 1 2 3 4 Score   Level of Concious (LOC) [x]  Alert & Oriented or Pt normal LOC []  Confused;follows directions []  Confused & uncooper-ative []  Obtunded []  Comatose 0   Respiratory Rate  (RR) []  Reg. rate & pattern. 12 - 20 bpm  []  Increased RR.  Greater than 20 bpm   [x]  SOB w/ exertion or RR greater than 24 bpm []  Access- ory muscle use at rest. Abn.  resp. []  SOB at rest.   2   Bilateral Breath Sounds (BBS) []  Clear []  Diminish-ed bases  []  Diminish-ed t/o, or rales   [x]  Sporadic, scattered wheezes or rhonchi []  Persistentwheezes and, or absent BBS 3   Cough [x]  Strong, effective, & non-prod. []  Effective & prod. Less than 25 ml (2 TBSP) over past 24 hrs []  Ineffective & non-prod to less than 25 ML over past 24 hrs []  Ineffective and, or greater than 25 ml sputum prod. past 24 hrs. []  Nonspon- taneous; Requires suctioning 0   Pulmonary History  (PULM HX) []  No smoking and no chronic pulmonaryhistory []  Former smoker. Quit over 12 mos. ago []  Current smoker or quit w/ in 12 mos []  Pulm. History and, or 20 pk/yr smoking hx [x]  Admitted w/ acute pulm. dx and, or has been admitted w/ pulm. dx 2 or more times over past 12 mos 4   Surgical History this Admit  (SURG HX) [x]  No surgery []  General surgery []  Lower abdominal []  Thoracic or upper abdominal   []  Thoracic w/ pulm. disease 0   Chest X-Ray (CXR)/CT Scan []  Clear or not applicable []  Not available []  Atelect- asis or pleural effusions [x]  Localized infiltrate or pulm. edema []  Con-solidated Infiltrates, bilateral, or in more than 1 lobe 3   Slow or Forced VC, FEV1 OR PEFR (PULM FXN)  [x]  80% or greater, or not indicated []  Pt. unable to perform []  FEV1 or PEFR or VC 51-79%.   []  FEV1 or PEFR or VC  30-49%   []  FEV1 or PEFR or VC less than 30%   0   TOTAL ACUITY: 12       CARE PLAN    If Acuity Level is 2, 3, or 4 in any of the following:    [x] BILATERAL BREATH SOUNDS (BBS)     [x] PULMONARY HISTORY (PULM HX)  [] PULMONARY FUNCTION (PULM FX)    Goal: Improve respiratory functions in patients with airway disease and decrease WOB    [x] AEROSOL PROTOCOL    Total Acuity:   16-32  []  Secondary Assessment in 24 hrs Total Acuity:  9-15  [x]  Secondary Assessment in 24 hrs Total Acuity:  4-8  []  Secondary Assessment in 48 hrs Total Acuity:  0-3  []  Secondary Assessment in 72 hrs   HHN AEROSOL THERAPY with  [physician-ordered bronchodilator(s)] q 4 & Albuterol PRN q2

## 2017-11-26 NOTE — PLAN OF CARE
Problem: Pain:  Goal: Pain level will decrease  Pain level will decrease   Outcome: Ongoing  Assess patients pain every four hours and as needed using the 1-10 pain scale. Patient encouraged to reposition every two hours and as needed. Medications as prescribed by physician. Alternatives to pain medications provided as tolerated. Will continue to monitor. Problem: Falls - Risk of  Goal: Absence of falls  Outcome: Ongoing  Patient is alert and oriented and has demonstrated the use of using the call light for assistance before getting up. Patient ambulates and transfers with +1 assistance with a front wheeled walker. Education has been provided to defer the use of the bed alarm and/or restraint free alarm as the patient has shown competency in calling for assistance and verbalizing the risk. Falling star program in use with fall band in place. Non skid socks are worn by patient. Call light, and bed side table along with personal belongings are within reach of patient. Will continue to monitor. Problem: Fluid Volume - Deficit:  Goal: Achieves intake and output within specified parameters  Achieves intake and output within specified parameters   Outcome: Ongoing  I&O being monitored. Patient is IV locked at this time. Problem: Gas Exchange - Impaired:  Goal: Levels of oxygenation will improve  Levels of oxygenation will improve   Outcome: Ongoing  Patient remains on oxygen at 1.5 L with an oxygen saturation of 93%. Will continue to monitor. Problem: Hyperthermia:  Goal: Ability to maintain a body temperature in the normal range will improve  Ability to maintain a body temperature in the normal range will improve   Outcome: Ongoing  Patient remains afebrile at this time.
Problem: Pain:  Goal: Pain level will decrease  Pain level will decrease   Outcome: Ongoing  Pain assessed every 4 hours. Patient is able to communicate with staff the need for pain interventions. Patient currently denies pain. Will continue to monitor. Problem: Falls - Risk of  Goal: Absence of falls  Outcome: Ongoing  Fall assessment completed daily. Bed in lowest position. Call light within reach. Falling star posted to outside of door. Fall risk sticker in place on ID band. Side rails up 2/4. Bed alarm on for safety. Patient ambulates fairly well with walker. Will continue to monitor. Problem: Airway Clearance - Ineffective:  Goal: Clear lung sounds  Clear lung sounds   Outcome: Ongoing  Lungs are diminished with some expiratory wheezes present. Non-productive cough noted. Problem: Gas Exchange - Impaired:  Goal: Levels of oxygenation will improve  Levels of oxygenation will improve   Outcome: Ongoing  Patient remains on 2L O2 via NC. Problem: Hyperthermia:  Goal: Ability to maintain a body temperature in the normal range will improve  Ability to maintain a body temperature in the normal range will improve   Outcome: Ongoing  Patient had fever upon arrival this shift. Medicated with tylenol per order.
Problem: Pain:  Goal: Pain level will decrease  Pain level will decrease   Outcome: Ongoing  Pain being monitored . Patient encouraged to use pain scale when rating pain. Patient using pain medications and non medication techniques to relieve pain. Problem: Falls - Risk of  Goal: Absence of falls  Outcome: Ongoing  No falls noted at this time. Problem: Discharge Planning:  Goal: Discharged to appropriate level of care  Discharged to appropriate level of care   Outcome: Ongoing  Patient will be going home after discharge. Home needs have been discussed. Problem: Airway Clearance - Ineffective:  Goal: Clear lung sounds  Clear lung sounds   Outcome: Ongoing  Airway velazco. Patient able to clear airway at will. Patient coughs occasionally. Will continue to monitor. Problem: Fluid Volume - Deficit:  Goal: Achieves intake and output within specified parameters  Achieves intake and output within specified parameters   Outcome: Ongoing  Monitoring fluid intake and output. Patient vital signs are WNL at this time. Problem: Gas Exchange - Impaired:  Goal: Levels of oxygenation will improve  Levels of oxygenation will improve   Outcome: Ongoing  SPO2 in lower 90s. Shortness of breath noted with exertion. Will continue monitor. Problem: Hyperthermia:  Goal: Ability to maintain a body temperature in the normal range will improve  Ability to maintain a body temperature in the normal range will improve   Outcome: Ongoing  Vitals with in normal limits. Will continue to monitor. Problem: Nutrition  Goal: Optimal nutrition therapy  Outcome: Ongoing  Patient able to eat without help. Eating more than 50% of meals. Will continue to monitor dietary intake. Problem: Musculor/Skeletal Functional Status  Goal: Highest potential functional level  Outcome: Ongoing  Patient able to stand and walk with minimal help. Patient denies weakness in arms and legs.
Problem: Pain:  Goal: Pain level will decrease  Pain level will decrease   Outcome: Ongoing  Pain scale of 0/10 being used to assess patient pain level. Patient rates pain at this time 0/10. Pain medication administered as ordered. Will assess pain level with hourly rounding and will continue to monitor. Problem: Falls - Risk of  Goal: Absence of falls  Outcome: Ongoing  Shore fall assessment scale complete. Fall band on ID bracelet and falling star posted on patient doorway. Non-skid socks remain in place with bed in lowest position and wheels locked. Call light within reach and bed alarm on. Will continue to monitor. Problem: Airway Clearance - Ineffective:  Goal: Clear lung sounds  Clear lung sounds   Outcome: Ongoing  Monitoring lung sounds, vitals, labs, and I/O. IV antibiotics continue as ordered. Problem: Gas Exchange - Impaired:  Goal: Levels of oxygenation will improve  Levels of oxygenation will improve   Outcome: Ongoing  O2 continues at 2 lpm per nc. O2 sat WDL. Problem: Hyperthermia:  Goal: Ability to maintain a body temperature in the normal range will improve  Ability to maintain a body temperature in the normal range will improve   Outcome: Ongoing  Afebrile at this time.
Problem: Pain:  Goal: Pain level will decrease  Pain level will decrease   Outcome: Ongoing  Pt is able to rate pain using a 0-10 scale. Medication, ice and repositioning reduce the pain if needed and pt is aware that it is available. Will continue to monitor. Problem: Falls - Risk of  Goal: Absence of falls  Outcome: Ongoing  Pt has no falls and is continuing to be monitored. Fall precautions remain intact. Bracelet on pt, star on, bed low and locked, alarm on, side rails up, call light and personal belongings within reach, and room clear and clean of clutter. Problem: Airway Clearance - Ineffective:  Goal: Clear lung sounds  Clear lung sounds   Outcome: Ongoing  Patient has clear lung sounds at this time. Problem: Fluid Volume - Deficit:  Goal: Achieves intake and output within specified parameters  Achieves intake and output within specified parameters   Outcome: Ongoing  Intake and output being monitored. IV is saline locked. Weight monitored daily. Problem: Gas Exchange - Impaired:  Goal: Levels of oxygenation will improve  Levels of oxygenation will improve   Outcome: Ongoing  Patient is on 3 liters via nasal cannula. Pulse oximetry is 92%. Problem: Hyperthermia:  Goal: Ability to maintain a body temperature in the normal range will improve  Ability to maintain a body temperature in the normal range will improve   Outcome: Ongoing  Patient has been afebrile.
Problem: Pain:  Goal: Pain level will decrease  Pain level will decrease   Outcome: Ongoing  Pt. Denies and pain or discomfort. Will continue to monitor. Problem: Falls - Risk of  Goal: Absence of falls  Outcome: Ongoing  Remains free from falls. Fall precautions in place, slipper socks on. Pathway clear. Call light and belongings in reach. Instructed to use call light prior to getting up. Problem: Airway Clearance - Ineffective:  Goal: Clear lung sounds  Clear lung sounds   Outcome: Ongoing  Expiratory wheezes noted to lungs on auscultation. Diminished in the bases. IV ATB ordered and continue along with breathing treatments. Problem: Gas Exchange - Impaired:  Goal: Levels of oxygenation will improve  Levels of oxygenation will improve   Outcome: Ongoing  Pulse ox greater than 90% on room air, breathing treatments given as ordered, no shortness of breath or difficulty breathing noted with exertion.
in the normal range will improve  Ability to maintain a body temperature in the normal range will improve   Outcome: Ongoing  Temperature within normal limits thus far this shift. Will continue to monitor. Problem: Musculor/Skeletal Functional Status  Goal: Highest potential functional level  Outcome: Ongoing  Therapy working with pt.

## 2017-11-26 NOTE — PROGRESS NOTES
Writer offered to assist with washing patient up at this time. Patient states she wants to wait till before breakfast to wash up. Will continue to monitor.

## 2017-11-27 VITALS
HEART RATE: 62 BPM | RESPIRATION RATE: 18 BRPM | SYSTOLIC BLOOD PRESSURE: 118 MMHG | BODY MASS INDEX: 30.34 KG/M2 | OXYGEN SATURATION: 94 % | WEIGHT: 160.7 LBS | TEMPERATURE: 97.5 F | DIASTOLIC BLOOD PRESSURE: 60 MMHG | HEIGHT: 61 IN

## 2017-11-27 PROBLEM — R09.02 HYPOXIA: Status: ACTIVE | Noted: 2017-11-22

## 2017-11-27 LAB
CULTURE: NORMAL
GLUCOSE BLD-MCNC: 140 MG/DL (ref 74–100)
GLUCOSE BLD-MCNC: 146 MG/DL (ref 74–100)
Lab: NORMAL
Lab: NORMAL
SPECIMEN DESCRIPTION: NORMAL
SPECIMEN DESCRIPTION: NORMAL
STATUS: NORMAL
STATUS: NORMAL

## 2017-11-27 PROCEDURE — 6370000000 HC RX 637 (ALT 250 FOR IP): Performed by: INTERNAL MEDICINE

## 2017-11-27 PROCEDURE — 94664 DEMO&/EVAL PT USE INHALER: CPT

## 2017-11-27 PROCEDURE — 97116 GAIT TRAINING THERAPY: CPT

## 2017-11-27 PROCEDURE — 2580000003 HC RX 258: Performed by: INTERNAL MEDICINE

## 2017-11-27 PROCEDURE — 94668 MNPJ CHEST WALL SBSQ: CPT

## 2017-11-27 PROCEDURE — 97110 THERAPEUTIC EXERCISES: CPT

## 2017-11-27 PROCEDURE — 82947 ASSAY GLUCOSE BLOOD QUANT: CPT

## 2017-11-27 PROCEDURE — 94640 AIRWAY INHALATION TREATMENT: CPT

## 2017-11-27 PROCEDURE — 6360000002 HC RX W HCPCS: Performed by: INTERNAL MEDICINE

## 2017-11-27 RX ORDER — IPRATROPIUM BROMIDE AND ALBUTEROL SULFATE 2.5; .5 MG/3ML; MG/3ML
3 SOLUTION RESPIRATORY (INHALATION) 4 TIMES DAILY
Qty: 360 ML | Status: ON HOLD | DISCHARGE
Start: 2017-11-27 | End: 2019-02-01

## 2017-11-27 RX ORDER — BENZONATATE 100 MG/1
100 CAPSULE ORAL 3 TIMES DAILY PRN
DISCHARGE
Start: 2017-11-27 | End: 2017-12-04

## 2017-11-27 RX ORDER — AMOXICILLIN AND CLAVULANATE POTASSIUM 250; 62.5 MG/5ML; MG/5ML
500 POWDER, FOR SUSPENSION ORAL 2 TIMES DAILY
Refills: 0 | DISCHARGE
Start: 2017-11-27 | End: 2017-12-03

## 2017-11-27 RX ADMIN — INSULIN LISPRO 2 UNITS: 100 INJECTION, SOLUTION INTRAVENOUS; SUBCUTANEOUS at 08:00

## 2017-11-27 RX ADMIN — Medication 10 ML: at 14:18

## 2017-11-27 RX ADMIN — ALLOPURINOL 100 MG: 100 TABLET ORAL at 08:00

## 2017-11-27 RX ADMIN — GABAPENTIN 100 MG: 100 CAPSULE ORAL at 14:17

## 2017-11-27 RX ADMIN — CEFTRIAXONE 1 G: 1 INJECTION, POWDER, FOR SOLUTION INTRAMUSCULAR; INTRAVENOUS at 14:17

## 2017-11-27 RX ADMIN — IPRATROPIUM BROMIDE AND ALBUTEROL SULFATE 1 AMPULE: .5; 3 SOLUTION RESPIRATORY (INHALATION) at 10:34

## 2017-11-27 RX ADMIN — Medication 3000 UNITS: at 07:59

## 2017-11-27 RX ADMIN — HEPARIN SODIUM 5000 UNITS: 5000 INJECTION, SOLUTION INTRAVENOUS; SUBCUTANEOUS at 08:00

## 2017-11-27 RX ADMIN — FERROUS SULFATE TAB 325 MG (65 MG ELEMENTAL FE) 325 MG: 325 (65 FE) TAB at 08:00

## 2017-11-27 RX ADMIN — IPRATROPIUM BROMIDE AND ALBUTEROL SULFATE 1 AMPULE: .5; 3 SOLUTION RESPIRATORY (INHALATION) at 05:46

## 2017-11-27 RX ADMIN — LEVOTHYROXINE SODIUM 88 MCG: 88 TABLET ORAL at 08:00

## 2017-11-27 RX ADMIN — Medication 10 ML: at 13:00

## 2017-11-27 RX ADMIN — INSULIN LISPRO 2 UNITS: 100 INJECTION, SOLUTION INTRAVENOUS; SUBCUTANEOUS at 11:53

## 2017-11-27 RX ADMIN — Medication 10 ML: at 08:05

## 2017-11-27 RX ADMIN — AMLODIPINE BESYLATE 10 MG: 10 TABLET ORAL at 07:59

## 2017-11-27 RX ADMIN — Medication 3000 UNITS: at 11:53

## 2017-11-27 RX ADMIN — AZITHROMYCIN MONOHYDRATE 500 MG: 500 INJECTION, POWDER, LYOPHILIZED, FOR SOLUTION INTRAVENOUS at 13:00

## 2017-11-27 RX ADMIN — MECLIZINE HYDROCHLORIDE 25 MG: 25 TABLET ORAL at 07:59

## 2017-11-27 RX ADMIN — GABAPENTIN 100 MG: 100 CAPSULE ORAL at 07:59

## 2017-11-27 RX ADMIN — CLOPIDOGREL BISULFATE 75 MG: 75 TABLET ORAL at 07:59

## 2017-11-27 ASSESSMENT — PAIN SCALES - GENERAL
PAINLEVEL_OUTOF10: 0

## 2017-11-27 NOTE — PROGRESS NOTES
Physical Therapy  Facility/Department: [address] [place] [address] MED SURG  Daily Treatment Note  NAME: Chevy Lainez  : 1925  MRN: [id number]    Date of Service: 2017    Patient Diagnosis(es):   Patient Active Problem List    Diagnosis Date Noted    Community acquired pneumonia of left lower lobe of lung ([facility] 2017     Priority: High     Class: Acute    Acute on chronic renal failure ([address] 2017    Acute hyperkalemia 2017    Acute on chronic diastolic CHF (congestive heart failure), NYHA class 4 ([address]     Severe mitral regurgitation by prior echocardiogram     Chronic obstructive pulmonary disease ([address] 2017    Debility 2017    Anemia 2017    Malignant neoplasm of thyroid gland ([facility] [address] 2015    Renal failure 2015    Calculus of kidney 2015    Anemia 2015    Transient cerebral ischemia 2015    Asthma 2015    Backache 2015    Senile nuclear sclerosis 2015    Hypertension 2014    Type 2 diabetes with nephropathy ([address] 2014     Class: Chronic    Hyperlipidemia 2014    Left shoulder pain 2014       Past Medical History:   Diagnosis Date    Headache(784.0)     migraines    History of thyroid cancer     Hyperlipidemia     Hypertension     Macular degeneration     Osteoporosis     TIA (transient ischemic attack)     Type II or unspecified type diabetes mellitus without mention of complication, not stated as uncontrolled      Past Surgical History:   Procedure Laterality Date    ANKLE SURGERY Left     CHOLECYSTECTOMY      HYSTERECTOMY      SPINE SURGERY      THYROIDECTOMY         Restrictions  Restrictions/Precautions  Restrictions/Precautions: General Precautions, Fall Risk  Subjective   General  Chart Reviewed: Yes  Subjective  Subjective: Pt reported no pain.    Pain Screening  Patient Currently in Pain: Denies  Pain Assessment  Pain Assessment: 0-10  Pain Level: 0  Vital Signs  Patient Currently in Pain: Denies       Orientation  Orientation  Overall Orientation Status: Within Functional Limits  Objective   Bed mobility  Supine to Sit: Contact guard assistance  Scooting: Stand by assistance  Transfers  Sit to Stand: Contact guard assistance  Stand to sit: Contact guard assistance  Ambulation  Ambulation?: Yes  Ambulation 1  Surface: level tile  Device: Rolling Walker  Other Apparatus: O2  Assistance: Contact guard assistance  Distance: 50 feet x 2  Comments: Minimal cues for posture and to stay closer to AD. Stairs/Curb  Stairs?: No     Balance  Posture: Fair  Sitting - Static: Good  Sitting - Dynamic: Good  Standing - Static: Fair  Standing - Dynamic: Fair  Exercises  Comments: Completed standing there ex x 15 of: heel/toe raises, marching, hip abd, and partial squats. Assessment      Patient Education: Educated pt on safety with amb. Pt with fair understanding. REQUIRES PT FOLLOW UP: Yes  Activity Tolerance  Activity Tolerance: Patient Tolerated treatment well       Discharge Recommendations:  Continue to assess pending progress    G-Code     OutComes Score    Goals  Short term goals  Time Frame for Short term goals: 12 visits  Short term goal 1: Pt will initiate LE strengthening for ease of transfers. Short term goal 2: Pt will be independent with bed mob/transfers for functional independence. Short term goal 3: Pt will ambulate independently 100ft with RW without LOB for ease of household ambulation. Short term goal 4: Pt will tolerate 20-30mins ther ex/act to improve endurance for ADLs. Plan    Plan  Times per week: 7 days per week  Times per day: Twice a day  Current Treatment Recommendations: Strengthening, Balance Training, Transfer Training, Gait Training, Endurance Training, Safety Education & Training, Home Exercise Program, Neuromuscular Re-education, IADL Training, Patient/Caregiver Education & Training, Stair training  Safety Devices  Type of devices:  All fall risk

## 2017-11-27 NOTE — PROGRESS NOTES
RESPIRATORY ASSESSMENT PROTOCOL                                                                                              Patient Name: Alix Mares Room#: 6827/6392-22 : 1925     Admitting diagnosis: CAP (community acquired pneumonia) due to Chlamydia species [J16.0]       Medical History:   Past Medical History:   Diagnosis Date    Headache(784.0)     migraines    History of thyroid cancer     Hyperlipidemia     Hypertension     Macular degeneration     Osteoporosis     TIA (transient ischemic attack)     Type II or unspecified type diabetes mellitus without mention of complication, not stated as uncontrolled        PATIENT ASSESSMENT    LABORATORY DATA  Hematology:   Lab Results   Component Value Date    WBC 6.3 2017    RBC 3.08 2017    RBC 0 2016    HGB 8.4 2017    HCT 26.2 2017     2017     10/26/2011     Chemistry:  No results found for: PHART, UYA3UOS, PO2ART, X2BMEPIE, ZPT1KEC, PBEA    Blood Culture:   Sputum Culture:     VITALS  Pulse: 87   Resp: 18  BP: (!) 153/74 (Upon return from bathroom)  SpO2: 93 % O2 Device: None (Room air)  Temp: 98 °F (36.7 °C)  Comment:     SKIN COLOR  [x] Normal  [] Pale  [] Dusky  [] Cyanotic      RESPIRATORY PATTERN  [x] Normal  [] Dyspnea  [] Cheyne-Collado  [] Kussmaul  [] Biots    AMBULATORY  [] Yes  [] No  [x] With Assistance    PEAK FLOW  Predicted:     Personal Best:        VITAL CAPACITY  Predicted value:  ml  Actual Value:  ml  30% of Predicted:  Ml    Patient Acuity 0 1 2 3 4 Score   Level of Concious (LOC) [x]  Alert & Oriented or Pt normal LOC []  Confused;follows directions []  Confused & uncooper-ative []  Obtunded []  Comatose 0   Respiratory Rate  (RR) [x]  Reg. rate & pattern. 12 - 20 bpm  []  Increased RR.  Greater than 20 bpm   []  SOB w/ exertion or RR greater than 24 bpm []  Access- ory muscle use at rest. Abn.  resp. []  SOB at rest.   0   Bilateral Breath Sounds (BBS) []  Clear []  Diminish-ed bases  [x]  Diminish-ed t/o, or rales   []  Sporadic, scattered wheezes or rhonchi []  Persistentwheezes and, or absent BBS 2   Cough [x]  Strong, effective, & non-prod. []  Effective & prod. Less than 25 ml (2 TBSP) over past 24 hrs []  Ineffective & non-prod to less than 25 ML over past 24 hrs []  Ineffective and, or greater than 25 ml sputum prod. past 24 hrs. []  Nonspon- taneous; Requires suctioning 0   Pulmonary History  (PULM HX) []  No smoking and no chronic pulmonaryhistory []  Former smoker. Quit over 12 mos. ago []  Current smoker or quit w/ in 12 mos []  Pulm. History and, or 20 pk/yr smoking hx [x]  Admitted w/ acute pulm. dx and, or has been admitted w/ pulm. dx 2 or more times over past 12 mos 4   Surgical History this Admit  (SURG HX) [x]  No surgery []  General surgery []  Lower abdominal []  Thoracic or upper abdominal   []  Thoracic w/ pulm. disease 0   Chest X-Ray (CXR)/CT Scan []  Clear or not applicable []  Not available []  Atelect- asis or pleural effusions [x]  Localized infiltrate or pulm. edema []  Con-solidated Infiltrates, bilateral, or in more than 1 lobe 3   Slow or Forced VC, FEV1 OR PEFR (PULM FXN)  [x]  80% or greater, or not indicated []  Pt. unable to perform []  FEV1 or PEFR or VC 51-79%.   []  FEV1 or PEFR or VC  30-49%   []  FEV1 or PEFR or VC less than 30%   0   TOTAL ACUITY: 9       CARE PLAN    If Acuity Level is 2, 3, or 4 in any of the following:    [x] BILATERAL BREATH SOUNDS (BBS)     [x] PULMONARY HISTORY (PULM HX)  [] PULMONARY FUNCTION (PULM FX)    Goal: Improve respiratory functions in patients with airway disease and decrease WOB    [] AEROSOL PROTOCOL    Total Acuity:   16-32  []  Secondary Assessment in 24 hrs Total Acuity:  9-15  [x]  Secondary Assessment in 24 hrs Total Acuity:  4-8  []  Secondary Assessment in 48 hrs Total Acuity:  0-3  []  Secondary Assessment in 72 hrs   HHN AEROSOL THERAPY with  [physician-ordered bronchodilator(s)] q 4 & to policy [id number]: (yumi with an X)  ____Yes    ____ No     ____ NA    Smoking Cessation Booklet given:  ____Yes  ____No ____Patient Anne-Marie Tena

## 2017-11-27 NOTE — DISCHARGE SUMMARY
Zachary Barnett  Physician Assistant Attestation Note For Discharge 11/27/17    I personally evaluated and examined the patient face-to-face in conjunction with the PA and agree with the management and dispostition of the patient. Please see PA's discharge summary note for full details. My key findings are:     SUBJECTIVE:    Patient seen for follow up of Community acquired pneumonia of left lower lobe of lung ([address] She stable at this point     OBJECTIVE:    Vitals:   Temp: 97.5 °F (36.4 °C)  BP: 118/60  Resp: 18  Pulse: 62  SpO2: 94 %    24HR INTAKE/OUTPUT:    Intake/Output Summary (Last 24 hours) at 11/27/17 1207  Last data filed at 11/27/17 7206   Gross per 24 hour   Intake              570 ml   Output             1000 ml   Net             -430 ml     -----------------------------------------------------------------  Exam:  GEN:    Awake, alert and oriented x 3. no acute distress  EYES:  EOMI, pupils equal   NECK: Supple. No lymphadenopathy. No carotid bruit  CVS:    RRR, no murmur, rub or gallop  PULM: CTA, no wheezes, rales or rhonchi  ABD:    Bowels sounds normal.  Abdomen is soft. No distention. No tenderness. EXT:   no edema bilaterally . No calf tenderness. NEURO: Motor and sensory are intact  SKIN:  No rashes. No skin lesions.       Diagnostic Data:    · All available data reviewed  Lab Results   Component Value Date    WBC 6.3 11/26/2017    HGB 8.4 (L) 11/26/2017    MCV 84.9 11/26/2017     11/26/2017    Lab Results   Component Value Date    GLUCOSE 138 (H) 11/26/2017    BUN 58 (H) 11/26/2017    CREATININE 2.26 (H) 11/26/2017     11/26/2017    K 4.8 11/26/2017    CALCIUM 8.9 11/26/2017    CL 99 11/26/2017    CO2 23 11/26/2017       ASSESSMENT:    · Resolving LLL pneumonia    PLAN:  · I agree with the plan as outlined in the PA's note        Clemente Melgar M.D.  11/27/2017  12:07 PM

## 2017-11-27 NOTE — PROGRESS NOTES
Report called to Az Plummer RN at [facility]. All questions answered and phone number left in case of additional questions.

## 2017-11-27 NOTE — PROGRESS NOTES
within reach     Therapy Time   Individual Concurrent Group Co-treatment   Time In 0705         Time Out 0730         Minutes [address] [place], [place]

## 2017-11-27 NOTE — PROGRESS NOTES
11/26/2017    LABALBU 3.5 11/22/2017    CREATININE 2.26 11/26/2017    CALCIUM 8.9 11/26/2017    GFRAA 25 11/26/2017    LABGLOM 20 11/26/2017    GLUCOSE 138 11/26/2017    GLUCOSE 124 05/19/2016           Xr Chest Standard (2 Vw)    Result Date: 11/22/2017  REPORT: Chest PA and lateral INDICATION: Leg swelling, bronchitis, wheezing, cough FINDINGS: Patchy consolidation and probable small effusion of the left lower lobe concerning for pneumonia. There is mild diffuse interstitial thickening throughout both lungs. Cardiomegaly. No free intraperitoneal air. Degenerative changes and osteopenia thoracic spine.  Final report electronically signed by Ev Nova on 11/22/2017 10:23 AM    Suspect developing left lower lobe pneumonia      ASSESSMENT:  LLL pneumonia---resolving                               CKD---stable            Hospital Problems:  Principal Problem:    Community acquired pneumonia of left lower lobe of lung ([address]  Active Problems:    Acute on chronic renal failure (HCC)    Acute hyperkalemia    Acute on chronic diastolic CHF (congestive heart failure), NYHA class 4 (HCC)    Severe mitral regurgitation by prior echocardiogram      All Problems:  Patient Active Problem List   Diagnosis    Hypertension    Type 2 diabetes with nephropathy ([address]    Hyperlipidemia    Left shoulder pain    Senile nuclear sclerosis    Malignant neoplasm of thyroid gland (HCC)    Renal failure    Calculus of kidney    Anemia    Transient cerebral ischemia    Asthma    Backache    Debility    Anemia    Chronic obstructive pulmonary disease ([address]    Community acquired pneumonia of left lower lobe of lung ([address]    Acute on chronic renal failure (HCC)    Acute hyperkalemia    Acute on chronic diastolic CHF (congestive heart failure), NYHA class 4 (HCC)    Severe mitral regurgitation by prior echocardiogram       PLAN:  · Will discuss rehab    HIGH RISK MEDS: none    Mayelin Haskins M.D.

## 2017-11-27 NOTE — PROGRESS NOTES
Physical Therapy  Facility/Department: [facility] MED SURG  Daily Treatment Note  NAME: Alix Mares  : 1925  MRN: [id number]    Date of Service: 2017    Patient Diagnosis(es):   Patient Active Problem List    Diagnosis Date Noted    Community acquired pneumonia of left lower lobe of lung ([facility] [address] 2017     Priority: High     Class: Acute    Acute on chronic renal failure ([facility] [address] 2017    Acute hyperkalemia 2017    Hypoxia 2017    Acute on chronic diastolic CHF (congestive heart failure), NYHA class 4 (HCC)     Severe mitral regurgitation by prior echocardiogram     Chronic obstructive pulmonary disease ([facility] [address] 2017    Debility 2017    Anemia 2017    Malignant ne[facility] gland ([facility] 2015    Renal failure 2015    Calculus of kidney 2015    Anemia 2015    Transient cerebral ischemia 2015    Asthma 2015    Backache 2015    Senile nuclear sclerosis 2015    Hypertension 2014    Type 2 diabetes with nephropathy ([facility] [address] 2014     Class: Chronic    Hyperlipidemia 2014    Left shoulder pain 2014       Past Medical History:   Diagnosis Date    Headache(784.0)     migraines    History of thyroid cancer     Hyperlipidemia     Hypertension     Macular degeneration     Osteoporosis     TIA (transient ischemic attack)     Type II or unspecified type diabetes mellitus without mention of complication, not stated as uncontrolled      Past Surgical History:   Procedure Laterality Date    ANKLE SURGERY Left     CHOLECYSTECTOMY      HYSTERECTOMY      SPINE SURGERY      THYROIDECTOMY         Restrictions  Restrictions/Precautions  Restrictions/Precautions: General Precautions, Fall Risk  Subjective   General  Chart Reviewed: Yes  Subjective  Subjective: Pt without any pain.    Pain Screening  Patient Currently in Pain: Denies  Pain Assessment  Pain Assessment: 0-10  Pain Level: place, Call light within reach     Therapy Time   Individual Concurrent Group Co-treatment   Time In [address] [place], [place]

## 2017-11-27 NOTE — DISCHARGE SUMMARY
[address] Haw[address] Rodríguez 5 TO 10 11/13/2017    RBCUA 0 TO 2 11/13/2017    EPITHUA 0 TO 2 11/13/2017    LEUKOCYTESUR SMALL (A) 11/13/2017    SPECGRAV 1.020 11/13/2017    GLUCOSEU NEGATIVE 11/13/2017    KETUA NEGATIVE 11/13/2017    PROTEINU 2+ (A) 11/13/2017    HGBUR 1+ (A) 11/13/2017    CASTUA NOT REPORTED 11/13/2017    CRYSTUA NOT REPORTED 11/13/2017    BACTERIA TRACE (A) 11/13/2017    YEAST NOT REPORTED 11/13/2017       Xr Chest Standard (2 Vw)    Result Date: 11/22/2017  REPORT: Chest PA and lateral INDICATION: Leg swelling, bronchitis, wheezing, cough FINDINGS: Patchy consolidation and probable small effusion of the left lower lobe concerning for pneumonia. There is mild diffuse interstitial thickening throughout both lungs. Cardiomegaly. No free intraperitoneal air. Degenerative changes and osteopenia thoracic spine.  Final report electronically signed by Pavithra Valladares on 11/22/2017 10:23 AM    Suspect developing left lower lobe pneumonia      Discharge Diagnoses:    Principal Problem:    Community acquired pneumonia of left lower lobe of lung ([facility]  Active Problems:    Acute on chronic renal failure (HCC)    Acute hyperkalemia    Acute on chronic diastolic CHF (congestive heart failure), NYHA class 4 (HCC)    Severe mitral regurgitation by prior echocardiogram    Hypoxia      Active Hospital Problems    Diagnosis Date Noted    Community acquired pneumonia of left lower lobe of lung ([facility] [address] [J18.1] 11/22/2017     Priority: High     Class: Acute    Acute on chronic renal failure ([address] [N17.9, N18.9] 11/22/2017    Acute hyperkalemia [E87.5] 11/22/2017    Hypoxia [R09.02] 11/22/2017    Acute on chronic diastolic CHF (congestive heart failure), NYHA class 4 (HCC) [I50.33]     Severe mitral regurgitation by prior echocardiogram [I34.0]        Discharge Medications:       Roddie Jaret   Home Medication Instructions [facility]:[id number]    Printed on:11/27/17 9846   Medication Information                      allopurinol (ZYLOPRIM) 300 MG tablet  Take 1 tablet by mouth daily             amLODIPine (NORVASC) 10 MG tablet  Take 1 tablet by mouth daily             amoxicillin-clavulanate (AUGMENTIN) 250-62.5 MG/5ML suspension  Take 10 mLs by mouth 2 times daily for 6 days             benzonatate (TESSALON) 100 MG capsule  Take 1 capsule by mouth 3 times daily as needed for Cough             clopidogrel (PLAVIX) 75 MG tablet  TAKE 1 TABLET BY MOUTH ONCE A DAY             ferrous sulfate 325 (65 FE) MG tablet  Take 325 mg by mouth daily (with breakfast). gabapentin (NEURONTIN) 100 MG capsule  TAKE 1 CAPSULE BY MOUTH THREE TIMES DAILY             glimepiride (AMARYL) 1 MG tablet  TAKE 1 TABLET BY MOUTH ONCE DAILY IN THE MORNING             hydrochlorothiazide (MICROZIDE) 12.5 MG capsule  Take 12.5 mg by mouth daily             ipratropium-albuterol (DUONEB) 0.5-2.5 (3) MG/3ML SOLN nebulizer solution  Inhale 3 mLs into the lungs 4 times daily             lactase (LACTAID) 3000 UNITS tablet  Take 4 tablets by mouth 3 times daily (with meals)              levothyroxine (SYNTHROID) 88 MCG tablet  Take 88 mcg by mouth daily              meclizine (ANTIVERT) 25 MG tablet  Take 25 mg by mouth daily              rosuvastatin (CRESTOR) 5 MG tablet  TAKE 1 TABLET BY MOUTH ONCE A DAY                 Patient Instructions:    Activity: activity as tolerated  Diet: cardiac diet  Wound Care: none needed  Other: daily weights, BMP Thursday, PT/OT, nebs     Disposition:   DC to [facility]    Follow up:  Patient will be followed by Lianet Cheng MD in 1 week    CORE MEASURES on Discharge (if applicable)  ACE/ARB in CHF: No  Statin in MI: NA  ASA in MI: NA  Statin in CVA: NA  Antiplatelet in CVA: NA    Total time spent on discharge services: 30 minutes    Including the following activities:  Evaluation and Management of patient  Discussion with patient and/or surrogate about current care plan  Coordination with Case Management and/or

## 2017-11-27 NOTE — PROGRESS NOTES
[facility]  OCCUPATIONAL THERAPY  No Visit Note    [] ICU    [x] Acute   Patient: Kevin Leung  Room: 4899/5539-04      Kevin Leung not seen on 11/27/2017 at 12:10 PM due to pt being d/c per physician orders.           Signature: CASSANDRA Billy

## 2017-11-30 ENCOUNTER — HOSPITAL ENCOUNTER (OUTPATIENT)
Age: 82
Setting detail: SPECIMEN
Discharge: HOME | End: 2017-11-30
Payer: MEDICARE

## 2017-11-30 LAB
ANION GAP SERPL CALCULATED.3IONS-SCNC: 15 MMOL/L (ref 9–17)
BUN BLDV-MCNC: 46 MG/DL (ref 8–23)
BUN/CREAT BLD: 28 (ref 9–20)
CALCIUM SERPL-MCNC: 9 MG/DL (ref 8.6–10.4)
CHLORIDE BLD-SCNC: 100 MMOL/L (ref 98–107)
CO2: 25 MMOL/L (ref 20–31)
CREAT SERPL-MCNC: 1.65 MG/DL (ref 0.5–0.9)
GFR AFRICAN AMERICAN: 35 ML/MIN
GFR NON-AFRICAN AMERICAN: 29 ML/MIN
GFR SERPL CREATININE-BSD FRML MDRD: ABNORMAL ML/MIN/{1.73_M2}
GFR SERPL CREATININE-BSD FRML MDRD: ABNORMAL ML/MIN/{1.73_M2}
GLUCOSE BLD-MCNC: 92 MG/DL (ref 70–99)
POTASSIUM SERPL-SCNC: 4.3 MMOL/L (ref 3.7–5.3)
SODIUM BLD-SCNC: 140 MMOL/L (ref 135–144)

## 2017-11-30 PROCEDURE — 80048 BASIC METABOLIC PNL TOTAL CA: CPT

## 2017-11-30 PROCEDURE — 36415 COLL VENOUS BLD VENIPUNCTURE: CPT

## 2017-11-30 PROCEDURE — P9604 ONE-WAY ALLOW PRORATED TRIP: HCPCS

## 2017-12-08 ENCOUNTER — HOSPITAL ENCOUNTER (OUTPATIENT)
Dept: LAB | Age: 82
Discharge: HOME | End: 2017-12-08
Payer: MEDICARE

## 2017-12-08 LAB
ANION GAP SERPL CALCULATED.3IONS-SCNC: 12 MMOL/L (ref 9–17)
BUN BLDV-MCNC: 34 MG/DL (ref 8–23)
BUN/CREAT BLD: 20 (ref 9–20)
CALCIUM SERPL-MCNC: 9 MG/DL (ref 8.6–10.4)
CHLORIDE BLD-SCNC: 102 MMOL/L (ref 98–107)
CO2: 27 MMOL/L (ref 20–31)
CREAT SERPL-MCNC: 1.69 MG/DL (ref 0.5–0.9)
GFR AFRICAN AMERICAN: 34 ML/MIN
GFR NON-AFRICAN AMERICAN: 28 ML/MIN
GFR SERPL CREATININE-BSD FRML MDRD: ABNORMAL ML/MIN/{1.73_M2}
GFR SERPL CREATININE-BSD FRML MDRD: ABNORMAL ML/MIN/{1.73_M2}
GLUCOSE BLD-MCNC: 117 MG/DL (ref 70–99)
POTASSIUM SERPL-SCNC: 4.2 MMOL/L (ref 3.7–5.3)
SODIUM BLD-SCNC: 141 MMOL/L (ref 135–144)

## 2017-12-08 PROCEDURE — 36415 COLL VENOUS BLD VENIPUNCTURE: CPT

## 2017-12-08 PROCEDURE — 80048 BASIC METABOLIC PNL TOTAL CA: CPT

## 2017-12-15 ENCOUNTER — HOSPITAL ENCOUNTER (INPATIENT)
Age: 82
LOS: 4 days | Discharge: HOME | DRG: 291 | End: 2017-12-19
Attending: INTERNAL MEDICINE | Admitting: INTERNAL MEDICINE
Payer: MEDICARE

## 2017-12-15 ENCOUNTER — APPOINTMENT (OUTPATIENT)
Dept: GENERAL RADIOLOGY | Age: 82
DRG: 291 | End: 2017-12-15
Payer: MEDICARE

## 2017-12-15 DIAGNOSIS — I50.23 ACUTE ON CHRONIC SYSTOLIC CONGESTIVE HEART FAILURE (HCC): Primary | ICD-10-CM

## 2017-12-15 PROBLEM — I50.43 ACUTE ON CHRONIC COMBINED SYSTOLIC AND DIASTOLIC CHF (CONGESTIVE HEART FAILURE) (HCC): Status: ACTIVE | Noted: 2017-12-15

## 2017-12-15 LAB
-: ABNORMAL
ABSOLUTE EOS #: 0.2 K/UL (ref 0–0.4)
ABSOLUTE IMMATURE GRANULOCYTE: ABNORMAL K/UL (ref 0–0.3)
ABSOLUTE LYMPH #: 1 K/UL (ref 1–4.8)
ABSOLUTE MONO #: 0.3 K/UL (ref 0–1)
ALBUMIN SERPL-MCNC: 3.8 G/DL (ref 3.5–5.2)
ALBUMIN/GLOBULIN RATIO: 1 (ref 1–2.5)
ALP BLD-CCNC: 86 U/L (ref 35–104)
ALT SERPL-CCNC: 9 U/L (ref 5–33)
AMORPHOUS: ABNORMAL
ANION GAP SERPL CALCULATED.3IONS-SCNC: 13 MMOL/L (ref 9–17)
AST SERPL-CCNC: 13 U/L
BACTERIA: ABNORMAL
BASOPHILS # BLD: 0 % (ref 0–2)
BASOPHILS ABSOLUTE: 0 K/UL (ref 0–0.2)
BILIRUB SERPL-MCNC: 0.41 MG/DL (ref 0.3–1.2)
BILIRUBIN URINE: NEGATIVE
BNP INTERPRETATION: ABNORMAL
BUN BLDV-MCNC: 38 MG/DL (ref 8–23)
BUN/CREAT BLD: 25 (ref 9–20)
CALCIUM SERPL-MCNC: 8.6 MG/DL (ref 8.6–10.4)
CASTS UA: ABNORMAL /LPF
CHLORIDE BLD-SCNC: 101 MMOL/L (ref 98–107)
CO2: 25 MMOL/L (ref 20–31)
COLOR: YELLOW
COMMENT UA: ABNORMAL
CREAT SERPL-MCNC: 1.53 MG/DL (ref 0.5–0.9)
CRYSTALS, UA: ABNORMAL /HPF
DIFFERENTIAL TYPE: ABNORMAL
EKG ATRIAL RATE: 77 BPM
EKG P AXIS: 56 DEGREES
EKG P-R INTERVAL: 144 MS
EKG Q-T INTERVAL: 380 MS
EKG QRS DURATION: 96 MS
EKG QTC CALCULATION (BAZETT): 430 MS
EKG R AXIS: 63 DEGREES
EKG T AXIS: -11 DEGREES
EKG VENTRICULAR RATE: 77 BPM
EOSINOPHILS RELATIVE PERCENT: 2 % (ref 0–8)
EPITHELIAL CELLS UA: ABNORMAL /HPF (ref 0–25)
ESTIMATED AVERAGE GLUCOSE: 134 MG/DL
GFR AFRICAN AMERICAN: 38 ML/MIN
GFR NON-AFRICAN AMERICAN: 32 ML/MIN
GFR SERPL CREATININE-BSD FRML MDRD: ABNORMAL ML/MIN/{1.73_M2}
GFR SERPL CREATININE-BSD FRML MDRD: ABNORMAL ML/MIN/{1.73_M2}
GLUCOSE BLD-MCNC: 107 MG/DL (ref 74–100)
GLUCOSE BLD-MCNC: 158 MG/DL (ref 70–99)
GLUCOSE URINE: NEGATIVE
HBA1C MFR BLD: 6.3 % (ref 4.8–5.9)
HCT VFR BLD CALC: 28.9 % (ref 36–46)
HEMOGLOBIN: 9.2 G/DL (ref 12–16)
IMMATURE GRANULOCYTES: ABNORMAL %
KETONES, URINE: NEGATIVE
LACTIC ACID, WHOLE BLOOD: NORMAL MMOL/L (ref 0.7–2.1)
LACTIC ACID: 0.8 MMOL/L (ref 0.5–2.2)
LEUKOCYTE ESTERASE, URINE: NEGATIVE
LYMPHOCYTES # BLD: 13 % (ref 24–44)
MCH RBC QN AUTO: 26.7 PG (ref 26–34)
MCHC RBC AUTO-ENTMCNC: 31.8 G/DL (ref 31–37)
MCV RBC AUTO: 84 FL (ref 80–100)
MONOCYTES # BLD: 4 % (ref 0–12)
MUCUS: ABNORMAL
NITRITE, URINE: NEGATIVE
OTHER OBSERVATIONS UA: ABNORMAL
PDW BLD-RTO: 17.1 % (ref 12.1–15.2)
PH UA: 5.5 (ref 5–9)
PLATELET # BLD: 236 K/UL (ref 140–450)
PLATELET ESTIMATE: ABNORMAL
PMV BLD AUTO: 9.4 FL (ref 6–12)
POTASSIUM SERPL-SCNC: 3.8 MMOL/L (ref 3.7–5.3)
PRO-BNP: ABNORMAL PG/ML
PROTEIN UA: ABNORMAL
RBC # BLD: 3.43 M/UL (ref 4–5.2)
RBC # BLD: ABNORMAL 10*6/UL
RBC UA: ABNORMAL /HPF (ref 0–2)
RENAL EPITHELIAL, UA: ABNORMAL /HPF
SEG NEUTROPHILS: 81 % (ref 36–66)
SEGMENTED NEUTROPHILS ABSOLUTE COUNT: 6 K/UL (ref 1.8–7.7)
SODIUM BLD-SCNC: 139 MMOL/L (ref 135–144)
SPECIFIC GRAVITY UA: 1.02 (ref 1.01–1.02)
THYROXINE, FREE: 1.25 NG/DL (ref 0.93–1.7)
TOTAL PROTEIN: 7.5 G/DL (ref 6.4–8.3)
TRICHOMONAS: ABNORMAL
TROPONIN INTERP: NORMAL
TROPONIN T: <0.03 NG/ML
TSH SERPL DL<=0.05 MIU/L-ACNC: 4.92 MIU/L (ref 0.3–5)
TURBIDITY: CLEAR
URINE HGB: ABNORMAL
UROBILINOGEN, URINE: NORMAL
WBC # BLD: 7.5 K/UL (ref 3.5–11)
WBC # BLD: ABNORMAL 10*3/UL
WBC UA: ABNORMAL /HPF (ref 0–5)
YEAST: ABNORMAL

## 2017-12-15 PROCEDURE — 83880 ASSAY OF NATRIURETIC PEPTIDE: CPT

## 2017-12-15 PROCEDURE — 6360000002 HC RX W HCPCS: Performed by: INTERNAL MEDICINE

## 2017-12-15 PROCEDURE — 96374 THER/PROPH/DIAG INJ IV PUSH: CPT

## 2017-12-15 PROCEDURE — 82947 ASSAY GLUCOSE BLOOD QUANT: CPT

## 2017-12-15 PROCEDURE — 84439 ASSAY OF FREE THYROXINE: CPT

## 2017-12-15 PROCEDURE — 94664 DEMO&/EVAL PT USE INHALER: CPT

## 2017-12-15 PROCEDURE — 94760 N-INVAS EAR/PLS OXIMETRY 1: CPT

## 2017-12-15 PROCEDURE — 99285 EMERGENCY DEPT VISIT HI MDM: CPT

## 2017-12-15 PROCEDURE — 93005 ELECTROCARDIOGRAM TRACING: CPT

## 2017-12-15 PROCEDURE — 83605 ASSAY OF LACTIC ACID: CPT

## 2017-12-15 PROCEDURE — 80053 COMPREHEN METABOLIC PANEL: CPT

## 2017-12-15 PROCEDURE — 71020 XR CHEST STANDARD TWO VW: CPT

## 2017-12-15 PROCEDURE — 6370000000 HC RX 637 (ALT 250 FOR IP): Performed by: INTERNAL MEDICINE

## 2017-12-15 PROCEDURE — 87086 URINE CULTURE/COLONY COUNT: CPT

## 2017-12-15 PROCEDURE — 84484 ASSAY OF TROPONIN QUANT: CPT

## 2017-12-15 PROCEDURE — 81001 URINALYSIS AUTO W/SCOPE: CPT

## 2017-12-15 PROCEDURE — 2580000003 HC RX 258: Performed by: INTERNAL MEDICINE

## 2017-12-15 PROCEDURE — 2000000000 HC ICU R&B

## 2017-12-15 PROCEDURE — 84443 ASSAY THYROID STIM HORMONE: CPT

## 2017-12-15 PROCEDURE — 6360000002 HC RX W HCPCS: Performed by: PHYSICIAN ASSISTANT

## 2017-12-15 PROCEDURE — 87040 BLOOD CULTURE FOR BACTERIA: CPT

## 2017-12-15 PROCEDURE — 36415 COLL VENOUS BLD VENIPUNCTURE: CPT

## 2017-12-15 PROCEDURE — 83036 HEMOGLOBIN GLYCOSYLATED A1C: CPT

## 2017-12-15 PROCEDURE — 85025 COMPLETE CBC W/AUTO DIFF WBC: CPT

## 2017-12-15 RX ORDER — MAGNESIUM SULFATE 1 G/100ML
1 INJECTION INTRAVENOUS PRN
Status: DISCONTINUED | OUTPATIENT
Start: 2017-12-15 | End: 2017-12-19 | Stop reason: HOSPADM

## 2017-12-15 RX ORDER — GABAPENTIN 100 MG/1
100 CAPSULE ORAL 3 TIMES DAILY
Status: DISCONTINUED | OUTPATIENT
Start: 2017-12-15 | End: 2017-12-19 | Stop reason: HOSPADM

## 2017-12-15 RX ORDER — ACETAMINOPHEN 325 MG/1
650 TABLET ORAL EVERY 4 HOURS PRN
Status: DISCONTINUED | OUTPATIENT
Start: 2017-12-15 | End: 2017-12-19 | Stop reason: HOSPADM

## 2017-12-15 RX ORDER — FUROSEMIDE 10 MG/ML
40 INJECTION INTRAMUSCULAR; INTRAVENOUS 2 TIMES DAILY
Status: DISCONTINUED | OUTPATIENT
Start: 2017-12-15 | End: 2017-12-17

## 2017-12-15 RX ORDER — SODIUM CHLORIDE 0.9 % (FLUSH) 0.9 %
10 SYRINGE (ML) INJECTION EVERY 12 HOURS SCHEDULED
Status: DISCONTINUED | OUTPATIENT
Start: 2017-12-15 | End: 2017-12-19 | Stop reason: HOSPADM

## 2017-12-15 RX ORDER — IPRATROPIUM BROMIDE AND ALBUTEROL SULFATE 2.5; .5 MG/3ML; MG/3ML
3 SOLUTION RESPIRATORY (INHALATION) 4 TIMES DAILY
Status: DISCONTINUED | OUTPATIENT
Start: 2017-12-15 | End: 2017-12-15

## 2017-12-15 RX ORDER — FAMOTIDINE 20 MG/1
20 TABLET, FILM COATED ORAL DAILY
Status: DISCONTINUED | OUTPATIENT
Start: 2017-12-15 | End: 2017-12-19 | Stop reason: HOSPADM

## 2017-12-15 RX ORDER — AMLODIPINE BESYLATE 10 MG/1
10 TABLET ORAL DAILY
Status: DISCONTINUED | OUTPATIENT
Start: 2017-12-16 | End: 2017-12-19 | Stop reason: HOSPADM

## 2017-12-15 RX ORDER — ONDANSETRON 2 MG/ML
4 INJECTION INTRAMUSCULAR; INTRAVENOUS EVERY 6 HOURS PRN
Status: DISCONTINUED | OUTPATIENT
Start: 2017-12-15 | End: 2017-12-19 | Stop reason: HOSPADM

## 2017-12-15 RX ORDER — LEVOTHYROXINE SODIUM 88 UG/1
88 TABLET ORAL DAILY
Status: DISCONTINUED | OUTPATIENT
Start: 2017-12-15 | End: 2017-12-19 | Stop reason: HOSPADM

## 2017-12-15 RX ORDER — DEXTROSE MONOHYDRATE 50 MG/ML
100 INJECTION, SOLUTION INTRAVENOUS PRN
Status: DISCONTINUED | OUTPATIENT
Start: 2017-12-15 | End: 2017-12-19 | Stop reason: HOSPADM

## 2017-12-15 RX ORDER — FUROSEMIDE 10 MG/ML
20 INJECTION INTRAMUSCULAR; INTRAVENOUS ONCE
Status: COMPLETED | OUTPATIENT
Start: 2017-12-15 | End: 2017-12-15

## 2017-12-15 RX ORDER — ATORVASTATIN CALCIUM 40 MG/1
40 TABLET, FILM COATED ORAL NIGHTLY
Status: DISCONTINUED | OUTPATIENT
Start: 2017-12-16 | End: 2017-12-19 | Stop reason: HOSPADM

## 2017-12-15 RX ORDER — NICOTINE POLACRILEX 4 MG
15 LOZENGE BUCCAL PRN
Status: DISCONTINUED | OUTPATIENT
Start: 2017-12-15 | End: 2017-12-19 | Stop reason: HOSPADM

## 2017-12-15 RX ORDER — POTASSIUM CHLORIDE 20 MEQ/1
40 TABLET, EXTENDED RELEASE ORAL PRN
Status: DISCONTINUED | OUTPATIENT
Start: 2017-12-15 | End: 2017-12-19 | Stop reason: HOSPADM

## 2017-12-15 RX ORDER — FERROUS SULFATE 325(65) MG
325 TABLET ORAL
Status: DISCONTINUED | OUTPATIENT
Start: 2017-12-16 | End: 2017-12-19 | Stop reason: HOSPADM

## 2017-12-15 RX ORDER — SODIUM CHLORIDE 0.9 % (FLUSH) 0.9 %
10 SYRINGE (ML) INJECTION PRN
Status: DISCONTINUED | OUTPATIENT
Start: 2017-12-15 | End: 2017-12-19 | Stop reason: HOSPADM

## 2017-12-15 RX ORDER — ACETAMINOPHEN 500 MG
1000 TABLET ORAL 3 TIMES DAILY PRN
COMMUNITY

## 2017-12-15 RX ORDER — ZOLPIDEM TARTRATE 5 MG/1
5 TABLET ORAL NIGHTLY PRN
COMMUNITY

## 2017-12-15 RX ORDER — POTASSIUM CHLORIDE 20MEQ/15ML
40 LIQUID (ML) ORAL PRN
Status: DISCONTINUED | OUTPATIENT
Start: 2017-12-15 | End: 2017-12-19 | Stop reason: HOSPADM

## 2017-12-15 RX ORDER — ZOLPIDEM TARTRATE 5 MG/1
5 TABLET ORAL NIGHTLY PRN
Status: DISCONTINUED | OUTPATIENT
Start: 2017-12-15 | End: 2017-12-15

## 2017-12-15 RX ORDER — CLOPIDOGREL BISULFATE 75 MG/1
75 TABLET ORAL DAILY
Status: DISCONTINUED | OUTPATIENT
Start: 2017-12-16 | End: 2017-12-19 | Stop reason: HOSPADM

## 2017-12-15 RX ORDER — MECLIZINE HYDROCHLORIDE 25 MG/1
25 TABLET ORAL DAILY
Status: DISCONTINUED | OUTPATIENT
Start: 2017-12-16 | End: 2017-12-19 | Stop reason: HOSPADM

## 2017-12-15 RX ORDER — CHOLECALCIFEROL (VITAMIN D3) 125 MCG
4 CAPSULE ORAL
Status: DISCONTINUED | OUTPATIENT
Start: 2017-12-15 | End: 2017-12-19 | Stop reason: HOSPADM

## 2017-12-15 RX ORDER — ALLOPURINOL 300 MG/1
300 TABLET ORAL DAILY
Status: DISCONTINUED | OUTPATIENT
Start: 2017-12-15 | End: 2017-12-19 | Stop reason: HOSPADM

## 2017-12-15 RX ORDER — POTASSIUM CHLORIDE 7.45 MG/ML
10 INJECTION INTRAVENOUS PRN
Status: DISCONTINUED | OUTPATIENT
Start: 2017-12-15 | End: 2017-12-19 | Stop reason: HOSPADM

## 2017-12-15 RX ORDER — DEXTROSE MONOHYDRATE 25 G/50ML
12.5 INJECTION, SOLUTION INTRAVENOUS PRN
Status: DISCONTINUED | OUTPATIENT
Start: 2017-12-15 | End: 2017-12-19 | Stop reason: HOSPADM

## 2017-12-15 RX ORDER — IPRATROPIUM BROMIDE AND ALBUTEROL SULFATE 2.5; .5 MG/3ML; MG/3ML
3 SOLUTION RESPIRATORY (INHALATION) EVERY 4 HOURS PRN
Status: DISCONTINUED | OUTPATIENT
Start: 2017-12-15 | End: 2017-12-19 | Stop reason: HOSPADM

## 2017-12-15 RX ADMIN — LEVOTHYROXINE SODIUM 88 MCG: 88 TABLET ORAL at 17:41

## 2017-12-15 RX ADMIN — FUROSEMIDE 40 MG: 10 INJECTION, SOLUTION INTRAMUSCULAR; INTRAVENOUS at 20:46

## 2017-12-15 RX ADMIN — INSULIN LISPRO 2 UNITS: 100 INJECTION, SOLUTION INTRAVENOUS; SUBCUTANEOUS at 20:44

## 2017-12-15 RX ADMIN — FAMOTIDINE 20 MG: 20 TABLET ORAL at 17:41

## 2017-12-15 RX ADMIN — ENOXAPARIN SODIUM 30 MG: 30 INJECTION SUBCUTANEOUS at 17:41

## 2017-12-15 RX ADMIN — Medication 10 ML: at 20:46

## 2017-12-15 RX ADMIN — FUROSEMIDE 20 MG: 10 INJECTION, SOLUTION INTRAMUSCULAR; INTRAVENOUS at 16:11

## 2017-12-15 RX ADMIN — GABAPENTIN 100 MG: 100 CAPSULE ORAL at 20:46

## 2017-12-15 ASSESSMENT — PAIN DESCRIPTION - ONSET: ONSET: ON-GOING

## 2017-12-15 ASSESSMENT — PAIN SCALES - GENERAL
PAINLEVEL_OUTOF10: 0
PAINLEVEL_OUTOF10: 4

## 2017-12-15 ASSESSMENT — PAIN DESCRIPTION - FREQUENCY: FREQUENCY: CONTINUOUS

## 2017-12-15 ASSESSMENT — PAIN DESCRIPTION - PAIN TYPE: TYPE: ACUTE PAIN

## 2017-12-15 ASSESSMENT — ENCOUNTER SYMPTOMS
SHORTNESS OF BREATH: 1
WHEEZING: 0
EYE DISCHARGE: 0
CONSTIPATION: 0
COUGH: 0
BLOOD IN STOOL: 0
ABDOMINAL PAIN: 0
SORE THROAT: 0
DIARRHEA: 0
BACK PAIN: 0
CHEST TIGHTNESS: 0
RHINORRHEA: 0
EYE REDNESS: 0
NAUSEA: 0
VOMITING: 0

## 2017-12-15 ASSESSMENT — PAIN DESCRIPTION - LOCATION: LOCATION: KNEE

## 2017-12-15 ASSESSMENT — PAIN DESCRIPTION - DESCRIPTORS: DESCRIPTORS: ACHING

## 2017-12-15 ASSESSMENT — PAIN DESCRIPTION - PROGRESSION: CLINICAL_PROGRESSION: NOT CHANGED

## 2017-12-15 ASSESSMENT — PAIN DESCRIPTION - ORIENTATION: ORIENTATION: RIGHT

## 2017-12-15 NOTE — H&P
vomiting  Genitourinary:negative  Integument/breast: negative for changed mole, dryness, pruritus, rash, skin color change and skin lesion(s)  Hematologic/lymphatic: negative for bleeding, easy bruising, lymphadenopathy and petechiae  Musculoskeletal:positive for arthralgias, back pain, stiff joints and has painful left knee, negative for bone pain, myalgias and neck pain  Neurological: positive for coordination problems, gait problems and weakness, negative for dizziness, headaches, memory problems, paresthesia, seizures, speech problems, tremors and vertigo  Behavioral/Psych: negative  All other systems were reviewed with the patient and are negative except as stated  Physical Exam:    Vitals:   Vitals:    12/15/17 1700   BP: 131/79   Pulse: 73   Resp: 17   Temp:    SpO2: 96%     Weight: 159 lb 1.6 oz (72.2 kg)   Height: 5' 1\" (154.9 cm)   GEN:   A & O x3, no apparent distress  EYES: No gross abnormalities.   NECK: normal, supple, no lymphadenopathy,  no carotid bruits  PULM: rales present- up to mid back  COR: regular rate & rhythm and no murmurs  ABD:  soft, non-tender, non-distended, normal bowel sounds, no masses or organomegaly  EXT:   no cyanosis, clubbing or edema present  , has swelling left knee  NEURO: negative  SKIN:  no rashes or significant lesions      -----------------------------------------------------------------  Diagnostic Data: Reviewed    Assessment:      Hospital Problems:  Active Problems:    Acute on chronic combined systolic and diastolic CHF (congestive heart failure) ([facility])      All Problems:  Patient Active Problem List   Diagnosis    Hypertension    Type 2 diabetes with nephropathy ([facility] [facility]    Hyperlipidemia    Left shoulder pain    Senile nuclear sclerosis    Malignant neoplasm of thyroid gland ([facility] [address]    Renal failure    Calculus of kidney    Anemia    Transient cerebral ischemia    Asthma    Backache    Debility    Anemia    Chronic obstructive pulmonary disease ([facility] [address]    Community acquired pneumonia of left lower lobe of lung ([facility] [address] [facility]    Acute on chronic renal failure (HCC)    Acute hyperkalemia    Chronic diastolic heart failure (HCC)    Severe mitral regurgitation by prior echocardiogram    Hypoxia    Acute on chronic combined systolic and diastolic CHF (congestive heart failure) ([facility]           Plan:       · This patient requires inpatient admission because of chf   · Factors affecting the medical complexity of this patient include CHF, CKD, anemia, COPD, hypertension, gout   · Estimated length of stay is 3 days  · IV lasix, heart monitor, monitor lytes and kidney function  ·   High risk medications: IV lasix    Joanie Vicente MD  CORE MEASURES  · DVT prophylaxis: Lovenox  · Decubitus ulcer present on admission: No  · CODE STATUS: DNR-CCA  · Nutrition Status: good   · Physical therapy: Yes   · Old Charts reviewed: Yes  · EKG Reviewed:  Yes  · Advance Directive Addressed: Yes    Joanie Vicente M.D.

## 2017-12-15 NOTE — ED PROVIDER NOTES
ONCE DAILY IN THE MORNING  Qty: 30 tablet, Refills: 5      allopurinol (ZYLOPRIM) 300 MG tablet Take 1 tablet by mouth daily  Qty: 90 tablet, Refills: 3      lactase (LACTAID) 3000 UNITS tablet Take 4 tablets by mouth 3 times daily (with meals)       clopidogrel (PLAVIX) 75 MG tablet TAKE 1 TABLET BY MOUTH ONCE A DAY  Qty: 30 tablet, Refills: 5      levothyroxine (SYNTHROID) 88 MCG tablet Take 88 mcg by mouth daily       ferrous sulfate 325 (65 FE) MG tablet Take 325 mg by mouth daily (with breakfast). meclizine (ANTIVERT) 25 MG tablet Take 25 mg by mouth daily              ALLERGIES     Review of patient's allergies indicates no known allergies. FAMILY HISTORY       Family History   Problem Relation Age of Onset    Stroke Mother     Heart Disease Father           SOCIAL HISTORY       Social History     Social History    Marital status:      Spouse name: N/A    Number of children: N/A    Years of education: N/A     Social History Main Topics    Smoking status: Never Smoker    Smokeless tobacco: Never Used    Alcohol use No    Drug use: No    Sexual activity: Not Asked     Other Topics Concern    None     Social History Narrative    None       SCREENINGS    Kimberley Coma Scale  Eye Opening: Spontaneous  Best Verbal Response: Oriented  Best Motor Response: Obeys commands  Kimberley Coma Scale Score: 15        PHYSICAL EXAM    (up to 7 for level 4, 8 or more for level 5)     ED Triage Vitals   BP Temp Temp Source Pulse Resp SpO2 Height Weight   12/15/17 1426 12/15/17 1426 12/15/17 1426 12/15/17 1426 12/15/17 1426 12/15/17 1426 12/15/17 1644 12/15/17 1644   (!) 155/79 99.9 °F (37.7 °C) Tympanic 78 20 (!) 88 % 5' 1\" (1.549 m) 159 lb 1.6 oz (72.2 kg)       Physical Exam   Constitutional: She is oriented to person, place, and time. She appears well-developed and well-nourished. No distress. HENT:   Head: Normocephalic and atraumatic.    Right Ear: External ear normal.   Left Ear: External ear normal.   Mouth/Throat: Oropharynx is clear and moist. No oropharyngeal exudate. Eyes: Conjunctivae and EOM are normal. Pupils are equal, round, and reactive to light. Right eye exhibits no discharge. Left eye exhibits no discharge. No scleral icterus. Neck: Normal range of motion. Neck supple. No tracheal deviation present. No thyromegaly present. Cardiovascular: Normal rate, regular rhythm and intact distal pulses. Exam reveals no gallop and no friction rub. No murmur heard. Pulmonary/Chest: Effort normal. No accessory muscle usage or stridor. No respiratory distress. Crackles heard bilaterally. Patient is not in respiratory distress   Abdominal: Soft. Bowel sounds are normal. She exhibits no distension. There is no rebound and no guarding. Musculoskeletal: Normal range of motion. She exhibits no edema, tenderness or deformity. Lymphadenopathy:     She has no cervical adenopathy. Neurological: She is alert and oriented to person, place, and time. She has normal reflexes. No cranial nerve deficit. She exhibits normal muscle tone. Skin: Skin is warm and dry. No rash noted. She is not diaphoretic. No erythema. Psychiatric: She has a normal mood and affect. Her behavior is normal.   Nursing note and vitals reviewed.       DIAGNOSTIC RESULTS     EKG: All EKG's are interpreted by the Emergency Department Physician who either signs or Co-signs this chart in the absence of a cardiologist.      RADIOLOGY:   Non-plain film images such as CT, Ultrasound and MRI are read by the radiologist. Plain radiographic images are visualized and preliminarily interpreted by the emergency physician with the below findings:      Interpretation per the Radiologist below, if available at the time of this note:    XR CHEST STANDARD (2 VW)   Final Result   CHF with effusions            ED BEDSIDE ULTRASOUND:   Performed by ED Physician - none    LABS:  Labs Reviewed   CBC WITH AUTO DIFFERENTIAL - Abnormal; Notable for the following:        Result Value    RBC 3.43 (*)     Hemoglobin 9.2 (*)     Hematocrit 28.9 (*)     RDW 17.1 (*)     Seg Neutrophils 81 (*)     Lymphocytes 13 (*)     All other components within normal limits   COMPREHENSIVE METABOLIC PANEL - Abnormal; Notable for the following:     Glucose 158 (*)     BUN 38 (*)     CREATININE 1.53 (*)     Bun/Cre Ratio 25 (*)     GFR Non- 32 (*)     GFR  38 (*)     All other components within normal limits   URINALYSIS WITH MICROSCOPIC - Abnormal; Notable for the following:     Urine Hgb 1+ (*)     Protein, UA 2+ (*)     Amorphous, UA 3+ (*)     All other components within normal limits   BRAIN NATRIURETIC PEPTIDE - Abnormal; Notable for the following:     Pro-BNP 17,026 (*)     All other components within normal limits   CULTURE BLOOD #1   CULTURE BLOOD #2   URINE CULTURE   LACTIC ACID, PLASMA   TROPONIN   TSH WITHOUT REFLEX   T4, FREE   HEMOGLOBIN A1C       All other labs were within normal range or not returned as of this dictation. EMERGENCY DEPARTMENT COURSE and DIFFERENTIAL DIAGNOSIS/MDM:   Vitals:    Vitals:    12/15/17 1617 12/15/17 1644 12/15/17 1700 12/15/17 1715   BP: (!) 144/69 133/70 131/79 138/65   Pulse: 72 75 73 74   Resp: 16 18 17 18   Temp:  98.4 °F (36.9 °C)     TempSrc:  Temporal     SpO2: 93% 98% 96% 99%   Weight:  159 lb 1.6 oz (72.2 kg)     Height:  5' 1\" (1.549 m)           MDM  68-year-old female with a history of multiple admissions here at this hospital for pneumonia and CHF who presents with exertional dyspnea and leg swelling. On exam, he does have 2+ pitting edema crackles heard bilaterally. She is afebrile here he is not with cough she was initially hypoxic at 87% on room air when she arrived she is not on home O2 will get labs to rule out CHF exacerbation pneumonia infection dehydration or O imbalance    BNP is elevated and 17,000. Chest x-ray showing CHF changes.   This point we'll call patient's primary care provider for admission    I called and spoke with Dr. Hosie Gosselin, patient's primary care provider he is aware patient's presentation workup. In the ER he is reviewed labs. He agrees to admit this patient for acute CHF exacerbation. Patient will be given 20 of Lasix here in the ER. Patient and family have been updated on plan to admit. Questions have been answered. They agree with plan. Procedures    FINAL IMPRESSION      1. Acute on chronic systolic congestive heart failure [facility])          DISPOSITION/PLAN   DISPOSITION Admitted    PATIENT REFERRED TO:  Lianet Cheng MD  [facility]  [address] [facility]  [phone]            DISCHARGE MEDICATIONS:  Current Discharge Medication List                 Summation      Patient Course:      ED Medications administered this visit:    Medications   furosemide (LASIX) injection 20 mg (20 mg Intravenous Given 12/15/17 1611)       New Prescriptions from this visit:    Current Discharge Medication List          Follow-up:  Lianet Cheng MD  [address] [place] [address]   [facility] [id number]              Final Impression:   1.  Acute on chronic systolic congestive heart failure ([facility] [address]               (Please note that portions of this note were completed with a voice recognition program.  Efforts were made to edit the dictations but occasionally words are mis-transcribed.)            Jami aFy PA-C  12/15/17 6834

## 2017-12-16 LAB
ANION GAP SERPL CALCULATED.3IONS-SCNC: 11 MMOL/L (ref 9–17)
BUN BLDV-MCNC: 39 MG/DL (ref 8–23)
BUN/CREAT BLD: 25 (ref 9–20)
CALCIUM SERPL-MCNC: 8.6 MG/DL (ref 8.6–10.4)
CHLORIDE BLD-SCNC: 103 MMOL/L (ref 98–107)
CO2: 28 MMOL/L (ref 20–31)
CREAT SERPL-MCNC: 1.57 MG/DL (ref 0.5–0.9)
CULTURE: NO GROWTH
CULTURE: NORMAL
GFR AFRICAN AMERICAN: 37 ML/MIN
GFR NON-AFRICAN AMERICAN: 31 ML/MIN
GFR SERPL CREATININE-BSD FRML MDRD: ABNORMAL ML/MIN/{1.73_M2}
GFR SERPL CREATININE-BSD FRML MDRD: ABNORMAL ML/MIN/{1.73_M2}
GLUCOSE BLD-MCNC: 149 MG/DL (ref 74–100)
GLUCOSE BLD-MCNC: 156 MG/DL (ref 74–100)
GLUCOSE BLD-MCNC: 165 MG/DL (ref 74–100)
GLUCOSE BLD-MCNC: 219 MG/DL (ref 74–100)
GLUCOSE BLD-MCNC: 93 MG/DL (ref 70–99)
GLUCOSE BLD-MCNC: 98 MG/DL (ref 74–100)
Lab: NORMAL
MAGNESIUM: 2.1 MG/DL (ref 1.6–2.6)
POTASSIUM SERPL-SCNC: 4.1 MMOL/L (ref 3.7–5.3)
SODIUM BLD-SCNC: 142 MMOL/L (ref 135–144)
SPECIMEN DESCRIPTION: NORMAL
SPECIMEN DESCRIPTION: NORMAL
STATUS: NORMAL

## 2017-12-16 PROCEDURE — 83735 ASSAY OF MAGNESIUM: CPT

## 2017-12-16 PROCEDURE — 2580000003 HC RX 258: Performed by: INTERNAL MEDICINE

## 2017-12-16 PROCEDURE — 82947 ASSAY GLUCOSE BLOOD QUANT: CPT

## 2017-12-16 PROCEDURE — G8987 SELF CARE CURRENT STATUS: HCPCS

## 2017-12-16 PROCEDURE — 1200000000 HC SEMI PRIVATE

## 2017-12-16 PROCEDURE — 6360000002 HC RX W HCPCS: Performed by: INTERNAL MEDICINE

## 2017-12-16 PROCEDURE — 6370000000 HC RX 637 (ALT 250 FOR IP): Performed by: INTERNAL MEDICINE

## 2017-12-16 PROCEDURE — 36415 COLL VENOUS BLD VENIPUNCTURE: CPT

## 2017-12-16 PROCEDURE — G8978 MOBILITY CURRENT STATUS: HCPCS

## 2017-12-16 PROCEDURE — G8979 MOBILITY GOAL STATUS: HCPCS

## 2017-12-16 PROCEDURE — 97162 PT EVAL MOD COMPLEX 30 MIN: CPT

## 2017-12-16 PROCEDURE — 80048 BASIC METABOLIC PNL TOTAL CA: CPT

## 2017-12-16 PROCEDURE — 97166 OT EVAL MOD COMPLEX 45 MIN: CPT

## 2017-12-16 PROCEDURE — 97530 THERAPEUTIC ACTIVITIES: CPT

## 2017-12-16 PROCEDURE — G8988 SELF CARE GOAL STATUS: HCPCS

## 2017-12-16 RX ADMIN — INSULIN LISPRO 1 UNITS: 100 INJECTION, SOLUTION INTRAVENOUS; SUBCUTANEOUS at 21:15

## 2017-12-16 RX ADMIN — GABAPENTIN 100 MG: 100 CAPSULE ORAL at 21:15

## 2017-12-16 RX ADMIN — GABAPENTIN 100 MG: 100 CAPSULE ORAL at 16:11

## 2017-12-16 RX ADMIN — INSULIN LISPRO 2 UNITS: 100 INJECTION, SOLUTION INTRAVENOUS; SUBCUTANEOUS at 12:01

## 2017-12-16 RX ADMIN — CLOPIDOGREL BISULFATE 75 MG: 75 TABLET ORAL at 08:30

## 2017-12-16 RX ADMIN — ATORVASTATIN CALCIUM 40 MG: 40 TABLET, FILM COATED ORAL at 21:15

## 2017-12-16 RX ADMIN — GABAPENTIN 100 MG: 100 CAPSULE ORAL at 08:30

## 2017-12-16 RX ADMIN — Medication 12000 UNITS: at 16:30

## 2017-12-16 RX ADMIN — FUROSEMIDE 40 MG: 10 INJECTION, SOLUTION INTRAMUSCULAR; INTRAVENOUS at 20:14

## 2017-12-16 RX ADMIN — FAMOTIDINE 20 MG: 20 TABLET ORAL at 08:29

## 2017-12-16 RX ADMIN — ACETAMINOPHEN 650 MG: 325 TABLET ORAL at 07:19

## 2017-12-16 RX ADMIN — INSULIN LISPRO 2 UNITS: 100 INJECTION, SOLUTION INTRAVENOUS; SUBCUTANEOUS at 16:30

## 2017-12-16 RX ADMIN — MECLIZINE HYDROCHLORIDE 25 MG: 25 TABLET ORAL at 08:29

## 2017-12-16 RX ADMIN — ALLOPURINOL 300 MG: 300 TABLET ORAL at 08:30

## 2017-12-16 RX ADMIN — Medication 12000 UNITS: at 08:39

## 2017-12-16 RX ADMIN — Medication 12000 UNITS: at 12:09

## 2017-12-16 RX ADMIN — ACETAMINOPHEN 650 MG: 325 TABLET ORAL at 16:11

## 2017-12-16 RX ADMIN — LEVOTHYROXINE SODIUM 88 MCG: 88 TABLET ORAL at 08:29

## 2017-12-16 RX ADMIN — Medication 10 ML: at 08:33

## 2017-12-16 RX ADMIN — ENOXAPARIN SODIUM 30 MG: 30 INJECTION SUBCUTANEOUS at 08:29

## 2017-12-16 RX ADMIN — AMLODIPINE BESYLATE 10 MG: 10 TABLET ORAL at 08:30

## 2017-12-16 RX ADMIN — Medication 10 ML: at 20:14

## 2017-12-16 RX ADMIN — FERROUS SULFATE TAB 325 MG (65 MG ELEMENTAL FE) 325 MG: 325 (65 FE) TAB at 08:29

## 2017-12-16 RX ADMIN — ACETAMINOPHEN 650 MG: 325 TABLET ORAL at 21:15

## 2017-12-16 RX ADMIN — FUROSEMIDE 40 MG: 10 INJECTION, SOLUTION INTRAMUSCULAR; INTRAVENOUS at 08:33

## 2017-12-16 RX ADMIN — ACETAMINOPHEN 650 MG: 325 TABLET ORAL at 00:40

## 2017-12-16 ASSESSMENT — PAIN DESCRIPTION - FREQUENCY
FREQUENCY: CONTINUOUS
FREQUENCY: CONTINUOUS
FREQUENCY: INTERMITTENT

## 2017-12-16 ASSESSMENT — PAIN DESCRIPTION - PAIN TYPE
TYPE: CHRONIC PAIN
TYPE: CHRONIC PAIN
TYPE: ACUTE PAIN
TYPE: CHRONIC PAIN

## 2017-12-16 ASSESSMENT — PAIN DESCRIPTION - ORIENTATION
ORIENTATION: RIGHT

## 2017-12-16 ASSESSMENT — PAIN DESCRIPTION - DESCRIPTORS
DESCRIPTORS: ACHING

## 2017-12-16 ASSESSMENT — PAIN SCALES - GENERAL
PAINLEVEL_OUTOF10: 0
PAINLEVEL_OUTOF10: 4
PAINLEVEL_OUTOF10: 2
PAINLEVEL_OUTOF10: 5
PAINLEVEL_OUTOF10: 3

## 2017-12-16 ASSESSMENT — PAIN DESCRIPTION - ONSET
ONSET: ON-GOING
ONSET: ON-GOING

## 2017-12-16 ASSESSMENT — PAIN DESCRIPTION - LOCATION
LOCATION: KNEE

## 2017-12-16 ASSESSMENT — PAIN DESCRIPTION - PROGRESSION: CLINICAL_PROGRESSION: NOT CHANGED

## 2017-12-16 NOTE — PROGRESS NOTES
Component Value Date    MYOGLOBIN 48 08/25/2014    TROPONINT <0.03 12/15/2017    CKTOTAL 57 03/25/2017    CKMB 2.8 03/25/2017    PROBNP 17,026 (H) 12/15/2017       Xr Chest Standard (2 Vw)    Result Date: 12/15/2017  REPORT: Chest PA and lateral INDICATION: Shortness of breath FINDINGS: Pulmonary vascular congestion is seen throughout both lungs. Trace right and small left pleural effusions. Cardiomegaly. No free intraperitoneal air. Degenerative changes thoracic spine.  Final report electronically signed by Umang Oviedo on 12/15/2017 3:46 PM    CHF with effusions      ASSESSMENT:    Active Problems:    Acute on chronic combined systolic and diastolic CHF (congestive heart failure) ([address]      Patient Active Problem List    Diagnosis Date Noted    Community acquired pneumonia of left lower lobe of lung ([address] 11/22/2017     Priority: High     Class: Acute    Acute on chronic combined systolic and diastolic CHF (congestive heart failure) ([address] 12/15/2017    Acute on chronic renal failure ([address] 11/22/2017    Acute hyperkalemia 11/22/2017    Hypoxia 11/22/2017    Chronic diastolic heart failure (HCC)     Severe mitral regurgitation by prior echocardiogram     Chronic obstructive pulmonary disease ([address] 04/06/2017    Debility 04/03/2017    Anemia 04/03/2017    Malignant neoplasm of thyroid gland ([address] 02/11/2015    Renal failure 02/11/2015    Calculus of kidney 02/11/2015    Anemia 02/11/2015    Transient cerebral ischemia 02/11/2015    Asthma 02/11/2015    Backache 02/11/2015    Senile nuclear sclerosis 02/02/2015    Hypertension 08/25/2014    Type 2 diabetes with nephropathy ([address] 08/25/2014     Class: Chronic    Hyperlipidemia 08/25/2014    Left shoulder pain 08/25/2014       PLAN:  · [facility]  · diuretics  · Critical Care Time: Deb Bajwa M.D.

## 2017-12-16 NOTE — PROGRESS NOTES
Occupational Therapy   Occupational Therapy Initial Assessment  Date: 2017   Patient Name: Coy Vital  MRN: [id number]     : 1925    Patient Diagnosis(es): The encounter diagnosis was Acute on chronic systolic congestive heart failure ([facility] [address] has a past medical history of Headache(784.0); History of thyroid cancer; Hyperlipidemia; Hypertension; Macular degeneration; Osteoporosis; TIA (transient ischemic attack); and Type II or unspecified type diabetes mellitus without mention of complication, not stated as uncontrolled. has a past surgical history that includes Hysterectomy; Cholecystectomy; Spine surgery; Thyroidectomy; and Ankle surgery (Left).            Restrictions  Restrictions/Precautions  Restrictions/Precautions: Cardiac, Fall Risk    Subjective   General  Chart Reviewed: Yes  Patient assessed for rehabilitation services?: Yes  Family / Caregiver Present: No  Pain Assessment  Patient Currently in Pain: Yes  Pain Assessment: 0-10  Pain Level: 2  Pain Type: Chronic pain  Pain Location: Knee  Pain Orientation: Right  Pain Descriptors: Aching  Pain Frequency: Continuous  Clinical Progression: Not changed  Pain Intervention(s): Medication (see eMar)  Response to Pain Intervention: Asleep with RR greater than 10  Oxygen Therapy  SpO2: 96 %  Social/Functional History  Social/Functional History  Lives With: Alone  Type of Home: House  Home Layout: One level  Home Access: Stairs to enter with rails  Entrance Stairs - Number of Steps: 1  Entrance Stairs - Rails: Both  Bathroom Shower/Tub: Walk-in shower, Tub/Shower unit  Bathroom Equipment: Grab bars in shower, Built-in shower seat  Bathroom Accessibility: Accessible  Home Equipment: Rolling walker, 4 wheeled walker  Receives Help From: Family  ADL Assistance: Independent  Homemaking Assistance: Independent  Ambulation Assistance: Independent  Transfer Assistance: Independent  Active : No  Additional Comments: Pt stated she was completed health OT, Patient would benefit from continued therapy after discharge     Plan   Plan  Times per week: 7  Times per day: Daily  Current Treatment Recommendations: Strengthening, Functional Mobility Training, Endurance Training, Self-Care / ADL  Plan Comment: ther-ex, ther-act, and self-care tasks. G-Code  OT G-codes  Functional Limitation: Self care  Self Care Current Status (): At least 40 percent but less than 60 percent impaired, limited or restricted  Self Care Goal Status (): At least 1 percent but less than 20 percent impaired, limited or restricted  OutComes Score                                           AM-PAC Score             Goals  Short term goals  Time Frame for Short term goals: 3-5 days  Short term goal 1: Pt to tolerate 15 minutes ther-ex/ther-act in order to improve strength and endurance for ADLs. Short term goal 2: Pt to tolerate >5 minutes dynamic standing during functional tasks with CGA. Short term goal 3: Pt to complete functional mobility tasks demonstrating G safety with LRAD. Short term goal 4: Pt to complete sink-side ADLs with min A. Long term goals  Time Frame for Long term goals : 4 weeks  Long term goal 1: Pt to complete ADL tasks with mod I in order to ensure safe return home. Patient Goals   Patient goals : Pt does not want to go to rehab, wants to go home.         Therapy Time   Individual Concurrent Group Co-treatment   Time In 0026         Time Out 1205         Minutes Larisa [address] [place] [address], OTR/L

## 2017-12-16 NOTE — PROGRESS NOTES
Physical Therapy    Facility/Department: [facility] AT THE [facility]  Initial Assessment    NAME: Coy Vital  : 1925  MRN: [id number]    Date of Service: 2017    Patient Diagnosis(es): The encounter diagnosis was Acute on chronic systolic congestive heart failure ([facility] [address] has a past medical history of Headache(784.0); History of thyroid cancer; Hyperlipidemia; Hypertension; Macular degeneration; Osteoporosis; TIA (transient ischemic attack); and Type II or unspecified type diabetes mellitus without mention of complication, not stated as uncontrolled. has a past surgical history that includes Hysterectomy; Cholecystectomy; Spine surgery; Thyroidectomy; and Ankle surgery (Left).     Restrictions  Restrictions/Precautions  Restrictions/Precautions: Cardiac, Fall Risk  Vision/Hearing  Vision: Within Functional Limits  Hearing: Within functional limits     Subjective  General  Chart Reviewed: Yes  Patient assessed for rehabilitation services?: Yes  Additional Pertinent Hx: CHF  Diagnosis: CHF  Pain Screening  Patient Currently in Pain: Yes  Pain Assessment  Pain Assessment: 0-10  Pain Level: 3  Pain Type: Chronic pain  Pain Location: Knee  Pain Orientation: Right  Vital Signs  Patient Currently in Pain: Yes       Orientation  Orientation  Overall Orientation Status: Within Normal Limits    Social/Functional History  Social/Functional History  Lives With: Alone  Type of Home: House  Home Layout: One level  Ambulation Assistance: Independent  Transfer Assistance: Independent  Objective          PROM RLE (degrees)  RLE PROM: WFL  AROM RLE (degrees)  RLE AROM: WFL  PROM LLE (degrees)  LLE PROM: WFL  AROM LLE (degrees)  LLE AROM : WFL  PROM RUE (degrees)  RUE PROM: WFL  AROM RUE (degrees)  RUE AROM : WFL  Strength RLE  Strength RLE: Exception  Comment: 3+/5  Strength LLE  Strength LLE: Exception  Comment: 3+/5  Strength RUE  Strength RUE: Exception  Comment: 3+/5  Strength LUE  Strength LUE: Exception  Comment: 3+/5 Bed mobility  Bridging: Contact guard assistance  Rolling to Left: Contact guard assistance  Rolling to Right: Contact guard assistance  Supine to Sit: Contact guard assistance  Sit to Supine: Contact guard assistance  Scooting: Contact guard assistance  Transfers  Sit to Stand: Contact guard assistance  Stand to sit: Contact guard assistance  Bed to Chair: Contact guard assistance  Stand Pivot Transfers: Contact guard assistance  Squat Pivot Transfers: Contact guard assistance  Lateral Transfers: Contact guard assistance  Ambulation  Ambulation?: Yes  WB Status: NO RESTRICTIONS  Ambulation 1  Surface: level tile  Device: Rolling Walker  Assistance: Contact guard assistance  Distance: 10 FT     Balance  Posture: Fair  Sitting - Static: Fair  Sitting - Dynamic: Fair  Standing - Static: Poor  Standing - Dynamic: Poor        Assessment   Body structures, Functions, Activity limitations: Decreased functional mobility ; Decreased ADL status; Decreased strength;Decreased high-level IADLs;Decreased balance  Treatment Diagnosis: CHF,GENERALIZED WEAKNESS. Prognosis: Good  Decision Making: Medium Complexity  REQUIRES PT FOLLOW UP: Yes  Activity Tolerance  Activity Tolerance: Patient Tolerated treatment well     Discharge Recommendations:  Home with assist PRN      Plan   Plan  Times per day: Twice a day  Current Treatment Recommendations: Strengthening, ROM, Balance Training, Functional Mobility Training, Transfer Training, Gait Training, Safety Education & Training, Patient/Caregiver Education & Training, Home Exercise Program  Safety Devices  Type of devices: Call light within reach, Nurse notified    G-Code  PT G-Codes  Functional Limitation: Mobility: Walking and moving around  Mobility: Walking and Moving Around Current Status (): At least 20 percent but less than 40 percent impaired, limited or restricted  Mobility: Walking and Moving Around Goal Status ():  At least 1 percent but less than 20 percent

## 2017-12-16 NOTE — PROGRESS NOTES
Breath-Actuated Neb if BBS Acuity = 4, and pt. can use MP. Notify physician if condition deteriorates. HHN AEROSOL THERAPY with  [physician-ordered bronchodilator(s)]  QID and Albuterol PRN q4 hrs. Breath-Actuated Neb if BBS Acuity = 4, and pt. can use MP. Notify physician if condition deteriorates. MDI THERAPY with  2 actuations of [physician-ordered bronchodilator(s)] via spacer TID Albuterol and PRNq4 hrs. If unable to utilize MDI: HHN [physician-ordered bronchodilator(s)] TID and Albuterol PRN q4 hrs. Notify physician if condition deteriorates. MDI THERAPY with  [physician-ordered bronchodilator(s)] via spacer TID PRN. If unable to utilize MDI: HHN [physician-ordered bronchodilator(s)] TID PRN. Notify physician if condition deteriorates. If Acuity Level is 2, 3, or 4 in any of the following:    [] COUGH     [] SURGICAL HISTORY (SURG HX)  [] CHEST XRAY (CXR)    Goal: Improvement in sputum mobilization in patients with ineffective airway clearance. Reverse atelectasis. [] Bronchopulmonary Hygiene Protocol    Total Acuity:   16-32  []  Secondary Assessment in 24 hrs Total Acuity:  9-15  []  Secondary Assessment in 24 hrs Total Acuity:  4-8  []  Secondary Assessment in 48 hrs Total Acuity:  0-3  []  Secondary Assessment in 72 hrs   METANEB QID with [physician-ordered bronchodilator(s)] if CXR Acuity = 4; otherwise:  PD&P, PEP, or Vest QID & PRN  NT Sxn PRN for ineffective cough  METANEB QID with [physician-ordered bronchodilator(s)] if CXR Acuity = 4; otherwise:  PD&P, PEP, or Vest TID & PRN  NT Sxn PRN for ineffective cough  Instruct patient to self-perform IS q1hr WA   Directed Cough self-performed q1hr WA     If Acuity Level is 2 or above in the following:    [] PULMONARY HISTORY (PULM HX)    Goal: Assist patient in quitting smoking to slow or stop the progression of lung disease.     [] Smoking Cessation Protocol    SMOKING CESSATION EDUCATION provided according to policy [id number]_042: martha with an X)  ____Yes    ____ No     ____ NA    Smoking Cessation Booklet given:  ____Yes  ____No ____Patient Nisreen Ac

## 2017-12-16 NOTE — PLAN OF CARE
Problem: Falls - Risk of  Goal: Absence of falls  Outcome: Ongoing  Called out for assistance. Up to [facility] with one assist.  Gait slightly unsteady with personal slippers on. Problem: Cardiac Output - Decreased:  Goal: Hemodynamic stability will improve  Hemodynamic stability will improve  Outcome: Ongoing  Respirations slightly labored with exertion. Voided 475 clear yellow urine. Diaper on. Returned to bed. Monitor shows RSR.

## 2017-12-17 LAB
ANION GAP SERPL CALCULATED.3IONS-SCNC: 12 MMOL/L (ref 9–17)
BUN BLDV-MCNC: 46 MG/DL (ref 8–23)
BUN/CREAT BLD: 25 (ref 9–20)
CALCIUM SERPL-MCNC: 9 MG/DL (ref 8.6–10.4)
CHLORIDE BLD-SCNC: 97 MMOL/L (ref 98–107)
CO2: 31 MMOL/L (ref 20–31)
CREAT SERPL-MCNC: 1.82 MG/DL (ref 0.5–0.9)
GFR AFRICAN AMERICAN: 31 ML/MIN
GFR NON-AFRICAN AMERICAN: 26 ML/MIN
GFR SERPL CREATININE-BSD FRML MDRD: ABNORMAL ML/MIN/{1.73_M2}
GFR SERPL CREATININE-BSD FRML MDRD: ABNORMAL ML/MIN/{1.73_M2}
GLUCOSE BLD-MCNC: 116 MG/DL (ref 74–100)
GLUCOSE BLD-MCNC: 118 MG/DL (ref 74–100)
GLUCOSE BLD-MCNC: 124 MG/DL (ref 70–99)
GLUCOSE BLD-MCNC: 165 MG/DL (ref 74–100)
GLUCOSE BLD-MCNC: 257 MG/DL (ref 74–100)
POTASSIUM SERPL-SCNC: 4.1 MMOL/L (ref 3.7–5.3)
SODIUM BLD-SCNC: 140 MMOL/L (ref 135–144)

## 2017-12-17 PROCEDURE — 97110 THERAPEUTIC EXERCISES: CPT

## 2017-12-17 PROCEDURE — 6360000002 HC RX W HCPCS: Performed by: INTERNAL MEDICINE

## 2017-12-17 PROCEDURE — 36415 COLL VENOUS BLD VENIPUNCTURE: CPT

## 2017-12-17 PROCEDURE — 97116 GAIT TRAINING THERAPY: CPT

## 2017-12-17 PROCEDURE — 82947 ASSAY GLUCOSE BLOOD QUANT: CPT

## 2017-12-17 PROCEDURE — 6370000000 HC RX 637 (ALT 250 FOR IP): Performed by: INTERNAL MEDICINE

## 2017-12-17 PROCEDURE — 80048 BASIC METABOLIC PNL TOTAL CA: CPT

## 2017-12-17 PROCEDURE — 2580000003 HC RX 258: Performed by: INTERNAL MEDICINE

## 2017-12-17 PROCEDURE — 1200000000 HC SEMI PRIVATE

## 2017-12-17 PROCEDURE — 97535 SELF CARE MNGMENT TRAINING: CPT

## 2017-12-17 RX ORDER — FUROSEMIDE 10 MG/ML
40 INJECTION INTRAMUSCULAR; INTRAVENOUS DAILY
Status: DISCONTINUED | OUTPATIENT
Start: 2017-12-18 | End: 2017-12-18

## 2017-12-17 RX ADMIN — FERROUS SULFATE TAB 325 MG (65 MG ELEMENTAL FE) 325 MG: 325 (65 FE) TAB at 08:11

## 2017-12-17 RX ADMIN — GABAPENTIN 100 MG: 100 CAPSULE ORAL at 08:11

## 2017-12-17 RX ADMIN — INSULIN LISPRO 1 UNITS: 100 INJECTION, SOLUTION INTRAVENOUS; SUBCUTANEOUS at 20:00

## 2017-12-17 RX ADMIN — ENOXAPARIN SODIUM 30 MG: 30 INJECTION SUBCUTANEOUS at 08:11

## 2017-12-17 RX ADMIN — Medication 12000 UNITS: at 08:10

## 2017-12-17 RX ADMIN — Medication 10 ML: at 08:10

## 2017-12-17 RX ADMIN — ATORVASTATIN CALCIUM 40 MG: 40 TABLET, FILM COATED ORAL at 20:00

## 2017-12-17 RX ADMIN — Medication 10 ML: at 20:02

## 2017-12-17 RX ADMIN — ACETAMINOPHEN 650 MG: 325 TABLET ORAL at 08:10

## 2017-12-17 RX ADMIN — CLOPIDOGREL BISULFATE 75 MG: 75 TABLET ORAL at 08:11

## 2017-12-17 RX ADMIN — MECLIZINE HYDROCHLORIDE 25 MG: 25 TABLET ORAL at 08:11

## 2017-12-17 RX ADMIN — FUROSEMIDE 40 MG: 10 INJECTION, SOLUTION INTRAMUSCULAR; INTRAVENOUS at 08:11

## 2017-12-17 RX ADMIN — Medication 12000 UNITS: at 11:39

## 2017-12-17 RX ADMIN — AMLODIPINE BESYLATE 10 MG: 10 TABLET ORAL at 08:11

## 2017-12-17 RX ADMIN — GABAPENTIN 100 MG: 100 CAPSULE ORAL at 20:00

## 2017-12-17 RX ADMIN — LEVOTHYROXINE SODIUM 88 MCG: 88 TABLET ORAL at 08:11

## 2017-12-17 RX ADMIN — FAMOTIDINE 20 MG: 20 TABLET ORAL at 08:11

## 2017-12-17 RX ADMIN — ALLOPURINOL 300 MG: 300 TABLET ORAL at 08:11

## 2017-12-17 RX ADMIN — Medication 12000 UNITS: at 16:52

## 2017-12-17 RX ADMIN — INSULIN LISPRO 6 UNITS: 100 INJECTION, SOLUTION INTRAVENOUS; SUBCUTANEOUS at 11:38

## 2017-12-17 RX ADMIN — GABAPENTIN 100 MG: 100 CAPSULE ORAL at 14:24

## 2017-12-17 ASSESSMENT — PAIN DESCRIPTION - LOCATION
LOCATION: KNEE
LOCATION: KNEE

## 2017-12-17 ASSESSMENT — PAIN SCALES - GENERAL
PAINLEVEL_OUTOF10: 0
PAINLEVEL_OUTOF10: 5
PAINLEVEL_OUTOF10: 0
PAINLEVEL_OUTOF10: 0
PAINLEVEL_OUTOF10: 3
PAINLEVEL_OUTOF10: 1

## 2017-12-17 ASSESSMENT — PAIN DESCRIPTION - PAIN TYPE: TYPE: ACUTE PAIN

## 2017-12-17 ASSESSMENT — PAIN DESCRIPTION - ORIENTATION
ORIENTATION: RIGHT
ORIENTATION: RIGHT

## 2017-12-17 ASSESSMENT — PAIN DESCRIPTION - DESCRIPTORS: DESCRIPTORS: THROBBING

## 2017-12-17 ASSESSMENT — PAIN DESCRIPTION - FREQUENCY: FREQUENCY: CONTINUOUS

## 2017-12-17 NOTE — PROGRESS NOTES
Occupational Therapy  Facility/Department: [facility] AT THE [facility] MED SURG  Daily Treatment Note  NAME: Abdullahi Calderon  : 1925  MRN: [id number]    Date of Service: 2017    Patient Diagnosis(es): The encounter diagnosis was Acute on chronic systolic congestive heart failure ([facility] [address] has a past medical history of Headache(784.0); History of thyroid cancer; Hyperlipidemia; Hypertension; Macular degeneration; Osteoporosis; TIA (transient ischemic attack); and Type II or unspecified type diabetes mellitus without mention of complication, not stated as uncontrolled. has a past surgical history that includes Hysterectomy; Cholecystectomy; Spine surgery; Thyroidectomy; and Ankle surgery (Left). Restrictions  Restrictions/Precautions  Restrictions/Precautions: Cardiac, Fall Risk, General Precautions  Subjective   General  Chart Reviewed: Yes  Patient assessed for rehabilitation services?: Yes  Response to previous treatment: Patient with no complaints from previous session  Family / Caregiver Present: No  General Comment  Comments: Upon arrival pt seated on commode. Pt agreed to completed sinkside ADLs. Pain Assessment  Patient Currently in Pain: Yes (NSG notified. )  Pain Assessment: 0-10  Pain Level: 5  Pain Location: Knee  Pain Orientation: Right  Vital Signs  Patient Currently in Pain: Yes (NSG notified. )   Orientation     Objective    ADL  Grooming: Stand by assistance  UE Bathing: Minimal assistance (To wash/reach back. Pt completed UB bathing seated sinkside. )  LE Bathing: Contact guard assistance (Pt stood to wash/reach bottom requiring CGA for safety. Pt declined to completed remainder of LB bathing tasks this date. )  UE Dressing: Minimal assistance (To [facility] gown. )  LE Dressing:  (Pt declined to leola [facility] brief this date, reporting \"I just put a new one on. \" )  Toileting: Contact guard assistance (Pt required CGA for toilet tf for safety, completed brief mgt and hygiene with SBA. )

## 2017-12-17 NOTE — PROGRESS NOTES
MEDICAL NUTRITION THERAPY - CONSULT ACKNOWLEDGEMENT    Acknowledgement of consult/positive nutrition screening regarding difficulty swallowing food pta. CC diet order currently on chart. Note weight history below. Labs reviewed. Will monitor oral intakes and S/Sx of difficulty swallowing. Will recommend SLP if concerns noted with eating. PO has been good and f/u with further recommendations as indicated.     Wt Readings from Last 10 Encounters:   12/17/17 154 lb 1.6 oz (69.9 kg)   12/12/17 159 lb (72.1 kg)   11/27/17 160 lb 11.2 oz (72.9 kg)   11/22/17 162 lb (73.5 kg)   11/20/17 158 lb (71.7 kg)   11/13/17 158 lb 8 oz (71.9 kg)   08/08/17 151 lb (68.5 kg)   06/12/17 152 lb (68.9 kg)   05/12/17 154 lb 6.4 oz (70 kg)   05/08/17 165 lb (74.8 kg)       Karina Miller RDN, EJ 12/17/2017 4:05 PM

## 2017-12-17 NOTE — PROGRESS NOTES
Physical Therapy  Facility/Department: [facility] AT THE [facility] MED SURG  Daily Treatment Note  NAME: Edson Rivas  : 1925  MRN: [id number]    Date of Service: 2017    Patient Diagnosis(es):   Patient Active Problem List    Diagnosis Date Noted    Community acquired pneumonia of left lower lobe of lung ([address] 2017     Priority: High     Class: Acute    Acute on chronic combined systolic and diastolic CHF (congestive heart failure) ([address] 12/15/2017    Acute on chronic renal failure ([address] 2017    Acute hyperkalemia 2017    Hypoxia 2017    Chronic diastolic heart failure (HCC)     Severe mitral regurgitation by prior echocardiogram     Chronic obstructive pulmonary disease ([address] 2017    Debility 2017    Anemia 2017    Malignant neoplasm of thyroid gland ([address] 2015    Renal failure 2015    Calculus of kidney 2015    Anemia 2015    Transient cerebral ischemia 2015    Asthma 2015    Backache 2015    Senile nuclear sclerosis 2015    Hypertension 2014    Type 2 diabetes with nephropathy ([address] 2014     Class: Chronic    Hyperlipidemia 2014    Left shoulder pain 2014       Past Medical History:   Diagnosis Date    Headache(784.0)     migraines    History of thyroid cancer     Hyperlipidemia     Hypertension     Macular degeneration     Osteoporosis     TIA (transient ischemic attack)     Type II or unspecified type diabetes mellitus without mention of complication, not stated as uncontrolled      Past Surgical History:   Procedure Laterality Date    ANKLE SURGERY Left     CHOLECYSTECTOMY      HYSTERECTOMY      SPINE SURGERY      THYROIDECTOMY         Restrictions  Restrictions/Precautions  Restrictions/Precautions: Cardiac, Fall Risk, General Precautions  Subjective   General  Chart Reviewed: Yes  Subjective  Subjective: Pt reported no pain.    Pain Screening  Patient Currently in Pain: Denies  Pain Assessment  Pain Assessment: 0-10  Pain Level: 0  Vital Signs  Patient Currently in Pain: Denies       Orientation  Orientation  Overall Orientation Status: Within Normal Limits  Objective      Transfers  Sit to Stand: Contact guard assistance  Stand to sit: Contact guard assistance  Ambulation  Ambulation?: Yes  WB Status: NO RESTRICTIONS  Ambulation 1  Surface: level tile  Device: Rolling Walker  Assistance: Contact guard assistance  Distance: 230 feet x 1  Comments: Verbal cues for posture. SPO2 94-95% after amb. Stairs/Curb  Stairs?: No     Balance  Posture: Fair  Sitting - Static: Good  Sitting - Dynamic: Good  Standing - Static: Fair;+  Standing - Dynamic: Fair  Exercises  Hip Flexion: 15x  Hip Abduction: 15x  Knee Long Arc Quad: 15x  Ankle Pumps: 15x  Comments: Above exercises completed in sitting. Assessment      Patient Education: Educated pt on safety with transfers/amb. Pt with good understanding. REQUIRES PT FOLLOW UP: Yes  Activity Tolerance  Activity Tolerance: Patient Tolerated treatment well       Discharge Recommendations:  Home with assist PRN    G-Code     OutComes Score    AM-PAC Score             Goals  Short term goals  Time Frame for Short term goals: IND GAIT [employer]  Short term goal 1: IND TRANSFERS  Long term goals  Time Frame for Long term goals : 4 WEEKS  Long term goal 1: IND GAIT [facility]. Patient Goals   Patient goals : 3 DAYS    Plan    Plan  Times per day: Twice a day  Current Treatment Recommendations: Strengthening, ROM, Balance Training, Functional Mobility Training, Transfer Training, Gait Training, Safety Education & Training, Patient/Caregiver Education & Training, Home Exercise Program  Safety Devices  Type of devices:  All fall risk precautions in place, Left in chair, Call light within reach, Chair alarm in place, Nurse notified     Therapy Time   Individual Concurrent Group Co-treatment   Time In 1100         Time Out 1132         Minutes [address], [place]

## 2017-12-17 NOTE — PLAN OF CARE
Problem: Pain:  Goal: Control of acute pain  Control of acute pain   Outcome: Ongoing  Patient c/o minimal knee pain, that she only wants Tylenol for. Tylenol keeping patient comfortable at this time. Problem: Falls - Risk of  Goal: Absence of falls  Outcome: Ongoing  Armband and falling star continued. Stand by assist with rolling walker for transfers d/t weakness and being blind. Call light in reach, bed in lowest position, bed/personal alarms in place. Patient is A&Ox3, knows to call for assistance, and has not made any attempts to get up on own. Problem: Risk for Impaired Skin Integrity  Goal: Tissue integrity - skin and mucous membranes  Structural intactness and normal physiological function of skin and  mucous membranes. Outcome: Ongoing  No skin breakdown noted. Patient is able to turn and reposition self, gets up to chair, and ambulates occasionally. Patient has some stress INC; periodic brief changes needed. Hygiene is WNLs. Problem: Fluid Volume - Imbalance:  Goal: Absence of imbalanced fluid volume signs and symptoms  Absence of imbalanced fluid volume signs and symptoms   Outcome: Ongoing  IV Lasix decreased d/t decreasing kidney function. Patient has no edema and has not gained any weight. Problem: Activity Intolerance:  Goal: Ability to tolerate increased activity will improve  Ability to tolerate increased activity will improve  Outcome: Ongoing  Patient no longer dyspneic on exertion; able to tolerate ambulation well with walker. Problem: Airway Clearance - Ineffective:  Goal: Ability to maintain a clear airway will improve  Ability to maintain a clear airway will improve  Outcome: Ongoing  Patient has no c/o cough or difficulty breathing.     Problem: Breathing Pattern - Ineffective:  Goal: Ability to achieve and maintain a regular respiratory rate will improve  Ability to achieve and maintain a regular respiratory rate will improve  Outcome: Ongoing  RR is WNLs with regular and unlabored breathing. Patient has no c/o dyspnea this am.    Problem: Gas Exchange - Impaired:  Goal: Levels of oxygenation will improve  Levels of oxygenation will improve  Outcome: Ongoing  Pulse ox in mid 90s on RA; patient tolerating well.

## 2017-12-17 NOTE — PROGRESS NOTES
Progress Note    SUBJECTIVE:  shrotness of breath better. No CP     OBJECTIVE:    Vitals:   TEMPERATURE:  Current - Temp: 97.1 °F (36.2 °C); Max - Temp  Av °F (36.1 °C)  Min: 97 °F (36.1 °C)  Max: 97.1 °F (36.2 °C)  RESPIRATIONS RANGE: Resp  Av.5  Min: 14  Max: 19  PULSE RANGE: Pulse  Av.1  Min: 62  Max: 74  BLOOD PRESSURE RANGE:  Systolic (34TKK), WCI:222 , Min:106 , YBR:028   ; Diastolic (55MYN), OAY:32, Min:60, Max:93    PULSE OXIMETRY RANGE: SpO2  Av %  Min: 93 %  Max: 96 %  24HR INTAKE/OUTPUT:    Intake/Output Summary (Last 24 hours) at 17 0966  Last data filed at 17 0810   Gross per 24 hour   Intake              724 ml   Output             1950 ml   Net            -1226 ml     -----------------------------------------------------------------  Exam:  General: A & O x3  HEENT: Supple neck & negative  Heart: Regular  Lungs: clear with few rales in bases. no wheezes.   Abdomen: Normal & soft, No tenderness and BS normal  Extremities:  Minor edema   Neuro: NonFocal     -----------------------------------------------------------------  Diagnostic Data:  Lab Results   Component Value Date    WBC 7.5 12/15/2017    HGB 9.2 (L) 12/15/2017     12/15/2017       Lab Results   Component Value Date    BUN 46 (H) 2017    CREATININE 1.82 (H) 2017     2017    K 4.1 2017    CALCIUM 9.0 2017    CL 97 (L) 2017    CO2 31 2017    LABGLOM 26 (L) 2017       Lab Results   Component Value Date    WBCUA 0 TO 2 12/15/2017    RBCUA 0 TO 2 12/15/2017    EPITHUA 2 TO 5 12/15/2017    LEUKOCYTESUR NEGATIVE 12/15/2017    SPECGRAV 1.020 12/15/2017    GLUCOSEU NEGATIVE 12/15/2017    KETUA NEGATIVE 12/15/2017    PROTEINU 2+ (A) 12/15/2017    HGBUR 1+ (A) 12/15/2017    CASTUA NOT REPORTED 12/15/2017    CRYSTUA NOT REPORTED 12/15/2017    BACTERIA NOT REPORTED 12/15/2017    YEAST NOT REPORTED 12/15/2017       Lab Results   Component Value Date    MYOGLOBIN 48 08/25/2014    TROPONINT <0.03 12/15/2017    CKTOTAL 57 03/25/2017    CKMB 2.8 03/25/2017    PROBNP 17,026 (H) 12/15/2017       Xr Chest Standard (2 Vw)    Result Date: 12/15/2017  REPORT: Chest PA and lateral INDICATION: Shortness of breath FINDINGS: Pulmonary vascular congestion is seen throughout both lungs. Trace right and small left pleural effusions. Cardiomegaly. No free intraperitoneal air. Degenerative changes thoracic spine.  Final report electronically signed by Aime Bolaños on 12/15/2017 3:46 PM    CHF with effusions      ASSESSMENT:    Active Problems:    Acute on chronic combined systolic and diastolic CHF (congestive heart failure) [facility])      Patient Active Problem List    Diagnosis Date Noted    Community acquired pneumonia of left lower lobe of lung ([address] 11/22/2017     Priority: High     Class: Acute    Acute on chronic combined systolic and diastolic CHF (congestive heart failure) ([address] 12/15/2017    Acute on chronic renal failure ([address] 11/22/2017    Acute hyperkalemia 11/22/2017    Hypoxia 11/22/2017    Chronic diastolic heart failure (HCC)     Severe mitral regurgitation by prior echocardiogram     Chronic obstructive pulmonary disease ([address] 04/06/2017    Debility 04/03/2017    Anemia 04/03/2017    Malignant neoplasm of thyroid gland ([address] 02/11/2015    Renal failure 02/11/2015    Calculus of kidney 02/11/2015    Anemia 02/11/2015    Transient cerebral ischemia 02/11/2015    Asthma 02/11/2015    Backache 02/11/2015    Senile nuclear sclerosis 02/02/2015    Hypertension 08/25/2014    Type 2 diabetes with nephropathy ([address] 08/25/2014     Class: Chronic    Hyperlipidemia 08/25/2014    Left shoulder pain 08/25/2014       PLAN:  · Decrease lasix 1x daily / bump in creatinine and BUN  · Critical Care Time: maeve Simon M.D.

## 2017-12-18 ENCOUNTER — APPOINTMENT (OUTPATIENT)
Dept: NON INVASIVE DIAGNOSTICS | Age: 82
DRG: 291 | End: 2017-12-18
Payer: MEDICARE

## 2017-12-18 LAB
ANION GAP SERPL CALCULATED.3IONS-SCNC: 12 MMOL/L (ref 9–17)
BUN BLDV-MCNC: 50 MG/DL (ref 8–23)
BUN/CREAT BLD: 27 (ref 9–20)
CALCIUM SERPL-MCNC: 8.7 MG/DL (ref 8.6–10.4)
CHLORIDE BLD-SCNC: 96 MMOL/L (ref 98–107)
CO2: 32 MMOL/L (ref 20–31)
CREAT SERPL-MCNC: 1.86 MG/DL (ref 0.5–0.9)
GFR AFRICAN AMERICAN: 31 ML/MIN
GFR NON-AFRICAN AMERICAN: 25 ML/MIN
GFR SERPL CREATININE-BSD FRML MDRD: ABNORMAL ML/MIN/{1.73_M2}
GFR SERPL CREATININE-BSD FRML MDRD: ABNORMAL ML/MIN/{1.73_M2}
GLUCOSE BLD-MCNC: 106 MG/DL (ref 74–100)
GLUCOSE BLD-MCNC: 118 MG/DL (ref 70–99)
GLUCOSE BLD-MCNC: 132 MG/DL (ref 74–100)
GLUCOSE BLD-MCNC: 276 MG/DL (ref 74–100)
GLUCOSE BLD-MCNC: 432 MG/DL (ref 74–100)
LV EF: 55 %
LVEF MODALITY: NORMAL
POTASSIUM SERPL-SCNC: 3.9 MMOL/L (ref 3.7–5.3)
SODIUM BLD-SCNC: 140 MMOL/L (ref 135–144)

## 2017-12-18 PROCEDURE — 97116 GAIT TRAINING THERAPY: CPT

## 2017-12-18 PROCEDURE — 2580000003 HC RX 258: Performed by: INTERNAL MEDICINE

## 2017-12-18 PROCEDURE — 97110 THERAPEUTIC EXERCISES: CPT

## 2017-12-18 PROCEDURE — 6360000002 HC RX W HCPCS: Performed by: INTERNAL MEDICINE

## 2017-12-18 PROCEDURE — 94664 DEMO&/EVAL PT USE INHALER: CPT

## 2017-12-18 PROCEDURE — 97535 SELF CARE MNGMENT TRAINING: CPT

## 2017-12-18 PROCEDURE — 36415 COLL VENOUS BLD VENIPUNCTURE: CPT

## 2017-12-18 PROCEDURE — 99223 1ST HOSP IP/OBS HIGH 75: CPT | Performed by: INTERNAL MEDICINE

## 2017-12-18 PROCEDURE — 93306 TTE W/DOPPLER COMPLETE: CPT

## 2017-12-18 PROCEDURE — 80048 BASIC METABOLIC PNL TOTAL CA: CPT

## 2017-12-18 PROCEDURE — 6370000000 HC RX 637 (ALT 250 FOR IP): Performed by: INTERNAL MEDICINE

## 2017-12-18 PROCEDURE — 82947 ASSAY GLUCOSE BLOOD QUANT: CPT

## 2017-12-18 PROCEDURE — 1200000000 HC SEMI PRIVATE

## 2017-12-18 PROCEDURE — 97530 THERAPEUTIC ACTIVITIES: CPT

## 2017-12-18 RX ORDER — FUROSEMIDE 20 MG/1
20 TABLET ORAL DAILY
Status: DISCONTINUED | OUTPATIENT
Start: 2017-12-18 | End: 2017-12-19 | Stop reason: HOSPADM

## 2017-12-18 RX ADMIN — MECLIZINE HYDROCHLORIDE 25 MG: 25 TABLET ORAL at 08:38

## 2017-12-18 RX ADMIN — FAMOTIDINE 20 MG: 20 TABLET ORAL at 08:38

## 2017-12-18 RX ADMIN — ACETAMINOPHEN 650 MG: 325 TABLET ORAL at 10:10

## 2017-12-18 RX ADMIN — ALLOPURINOL 300 MG: 300 TABLET ORAL at 08:38

## 2017-12-18 RX ADMIN — CLOPIDOGREL BISULFATE 75 MG: 75 TABLET ORAL at 08:38

## 2017-12-18 RX ADMIN — INSULIN LISPRO 6 UNITS: 100 INJECTION, SOLUTION INTRAVENOUS; SUBCUTANEOUS at 19:49

## 2017-12-18 RX ADMIN — AMLODIPINE BESYLATE 10 MG: 10 TABLET ORAL at 08:38

## 2017-12-18 RX ADMIN — LEVOTHYROXINE SODIUM 88 MCG: 88 TABLET ORAL at 08:38

## 2017-12-18 RX ADMIN — FUROSEMIDE 20 MG: 20 TABLET ORAL at 08:38

## 2017-12-18 RX ADMIN — GABAPENTIN 100 MG: 100 CAPSULE ORAL at 13:16

## 2017-12-18 RX ADMIN — GABAPENTIN 100 MG: 100 CAPSULE ORAL at 20:00

## 2017-12-18 RX ADMIN — Medication 10 ML: at 19:50

## 2017-12-18 RX ADMIN — ENOXAPARIN SODIUM 30 MG: 30 INJECTION SUBCUTANEOUS at 08:38

## 2017-12-18 RX ADMIN — GABAPENTIN 100 MG: 100 CAPSULE ORAL at 08:38

## 2017-12-18 RX ADMIN — INSULIN LISPRO 6 UNITS: 100 INJECTION, SOLUTION INTRAVENOUS; SUBCUTANEOUS at 13:00

## 2017-12-18 RX ADMIN — ATORVASTATIN CALCIUM 40 MG: 40 TABLET, FILM COATED ORAL at 19:50

## 2017-12-18 RX ADMIN — FERROUS SULFATE TAB 325 MG (65 MG ELEMENTAL FE) 325 MG: 325 (65 FE) TAB at 08:38

## 2017-12-18 RX ADMIN — Medication 10 ML: at 08:40

## 2017-12-18 ASSESSMENT — PAIN SCALES - GENERAL
PAINLEVEL_OUTOF10: 3
PAINLEVEL_OUTOF10: 0
PAINLEVEL_OUTOF10: 0
PAINLEVEL_OUTOF10: 3
PAINLEVEL_OUTOF10: 0
PAINLEVEL_OUTOF10: 0
PAINLEVEL_OUTOF10: 8
PAINLEVEL_OUTOF10: 0
PAINLEVEL_OUTOF10: 4

## 2017-12-18 ASSESSMENT — PAIN DESCRIPTION - ORIENTATION
ORIENTATION: RIGHT

## 2017-12-18 ASSESSMENT — PAIN DESCRIPTION - FREQUENCY: FREQUENCY: INTERMITTENT

## 2017-12-18 ASSESSMENT — PAIN DESCRIPTION - DESCRIPTORS: DESCRIPTORS: THROBBING

## 2017-12-18 ASSESSMENT — PAIN DESCRIPTION - ONSET: ONSET: GRADUAL

## 2017-12-18 ASSESSMENT — PAIN DESCRIPTION - PAIN TYPE
TYPE: ACUTE PAIN
TYPE: ACUTE PAIN;CHRONIC PAIN
TYPE: ACUTE PAIN

## 2017-12-18 ASSESSMENT — PAIN DESCRIPTION - PROGRESSION: CLINICAL_PROGRESSION: RAPIDLY WORSENING

## 2017-12-18 ASSESSMENT — PAIN DESCRIPTION - LOCATION
LOCATION: KNEE

## 2017-12-18 NOTE — CARE COORDINATION
Spoke with patient daughter in law, Freddy Elaine, about possibly getting a voice-activated scale for patient's daily weight measurement since patient is unable to see the numbers on the scale. States they will check into getting such. Also states that patient's son visits almost daily after work and could weigh patient at that time and record finding. Aware of the importance of weight monitoring for CHF.   Alessandro Chan, RN

## 2017-12-18 NOTE — PROGRESS NOTES
06/12/17 152 lb (68.9 kg)   05/12/17 154 lb 6.4 oz (70 kg)   05/08/17 165 lb (74.8 kg)     Recent Labs      12/15/17   1435  12/16/17   0535  12/17/17   0520  12/18/17   0549   NA  139  142  140  140   K  3.8  4.1  4.1  3.9   CL  101  103  97*  96*   CO2  25  28  31  32*   BUN  38*  39*  46*  50*   CREATININE  1.53*  1.57*  1.82*  1.86*   GLUCOSE  158*  93  124*  118*   ALT  9   --    --    --    ALKPHOS  86   --    --    --    GFR                                                      Lab Results   Component Value Date    LABALBU 3.8 12/15/2017      Lab Results   Component Value Date    LABA1C 6.3 12/15/2017     Recent Labs      12/16/17   1109  12/16/17   1609  12/16/17   2114  12/17/17   0803  12/17/17   1105  12/17/17   1625  12/17/17   1959  12/18/17   0702   POCGLU  149*  156*  165*  118*  257*  116*  165*  132*     Lab Results   Component Value Date    TRIG 103 05/10/2017    HDL 33 05/10/2017    LDLCALC 98 01/28/2017    LABVLDL 40 07/23/2016     Estimated Intake vs Estimated Needs: Intake Meets Needs    Nutrition Risk Level: Moderate  Renal indices have increased since admission. K+ is wnl. Glucose is trending down. Pt with good glycemic control by A1c. Per historical data, Pt with poor vision and cannot read food labels and swallowing issues are from  Hiatal hernia. She typically avoids K+ foods and limits sodium. BNP 17,026. Recommend to add 2 gm sodium to diet and modify CC to 4 carbs per meal. F/u with education needs.    Nutrition Interventions:   Modify current diet (Add 2 gm sodium to diet)  Continued Inpatient Monitoring, Education not appropriate at this time, Coordination of Care    Nutrition Evaluation:   · Evaluation: Goals set   · Goals: POI > 75% of meals    · Monitoring: Meal Intake, Weight, Pertinent Labs, Chewing/Swallowing, Patient/Family Education (f/u low sodium diet education needs)      Electronically signed by Aurea Woodward RD, LD on 12/18/17 at 10:25 AM    Contact Number:

## 2017-12-18 NOTE — PATIENT CARE CONFERENCE
Jimy Aaron was admitted on 12/15/2017  2:13 PM with Acute on chronic combined systolic and diastolic CHF (congestive heart failure) ([facility] [address] [I50.43]    Quality Flow Rounds held. Wants to return home. Weight stable. Echocardiogram today. Cardiology consulted. Lasix changed to p.o. States has a scale at home - however due to vision - unable to see. May need a talking scale at home to monitor weight for CHF. Ambulated 250 feet with walker.

## 2017-12-18 NOTE — PROGRESS NOTES
 Left shoulder pain    Senile nuclear sclerosis    Malignant neoplasm of thyroid gland (HCC)    Renal failure    Calculus of kidney    Anemia    Transient cerebral ischemia    Asthma    Backache    Debility    Anemia    Chronic obstructive pulmonary disease (HCC)    Community acquired pneumonia of left lower lobe of lung (HCC)    Acute on chronic renal failure (HCC)    Acute hyperkalemia    Chronic diastolic heart failure (HCC)    Severe mitral regurgitation by prior echocardiogram    Hypoxia    Acute on chronic combined systolic and diastolic CHF (congestive heart failure) (HCC)       PLAN:  · Will decrease lasix to 20mg QD    HIGH RISK MEDS: none    Clemente Melgar M.D.

## 2017-12-18 NOTE — PLAN OF CARE
Problem: Pain:  Goal: Pain level will decrease  Pain level will decrease   Outcome: Ongoing  Pain assessed t/o shift. Pt instructed on rating pain scale, verbalized understanding. Pain medication administered per [facility] with good effects. Will continue to monitor      Problem: Falls - Risk of  Goal: Absence of falls  Outcome: Ongoing  A&Ox3, able to call for assistance when needed. Call light within reach at all times. Bed locked. Bed alarm and/or personal alarm on at all times. Non-skid socks on when out of bed. Assistance provided for transfers, assistive device used for ambulation. Will continue to monitor. Problem: Risk for Impaired Skin Integrity  Goal: Tissue integrity - skin and mucous membranes  Structural intactness and normal physiological function of skin and  mucous membranes. Outcome: Ongoing  Skin assessment completed per shift and as needed. Pt encouraged and assisted with turning and repositioning every 2 hours and as needed. Skin clean and dry at all times. Monitoring skin every shift and as needed. Will continue to monitor. Problem: Cardiac Output - Decreased:  Goal: Hemodynamic stability will improve  Hemodynamic stability will improve   Outcome: Ongoing  ECHO completed this day, pt aware of results per Dr. Hiral Chapa, expresses satisfaction with results. Problem: Activity Intolerance:  Goal: Ability to tolerate increased activity will improve  Ability to tolerate increased activity will improve   Outcome: Ongoing      Problem: Gas Exchange - Impaired:  Goal: Levels of oxygenation will improve  Levels of oxygenation will improve   Outcome: Ongoing  SpO2 >90%, on room air, t/o shift during spot checks. Patient encouraged to cough and deep breath hourly. No cyanosis noted. Unlabored breathing, chest expansion symmetrical. No ssx of respiratory distress noted/reported t/o shift.

## 2017-12-18 NOTE — PROGRESS NOTES
Occupational Therapy  [facility]  Daily Treatment Note  NAME: Corinna Perea  : 1925  MRN: [id number]    Date of Service: 2017    Patient Diagnosis(es): The encounter diagnosis was Acute on chronic systolic congestive heart failure ([address] has a past medical history of Headache(784.0); History of thyroid cancer; Hyperlipidemia; Hypertension; Macular degeneration; Osteoporosis; TIA (transient ischemic attack); and Type II or unspecified type diabetes mellitus without mention of complication, not stated as uncontrolled. has a past surgical history that includes Hysterectomy; Cholecystectomy; Spine surgery; Thyroidectomy; and Ankle surgery (Left). Restrictions  Restrictions/Precautions  Restrictions/Precautions: Cardiac, Fall Risk, General Precautions  Subjective   General  Chart Reviewed: Yes  Patient assessed for rehabilitation services?: Yes  Response to previous treatment: Patient with no complaints from previous session  Family / Caregiver Present: No  General Comment  Comments: Upon arrival pt seated sinkside with completion of ADL tasks. Pain Assessment  Patient Currently in Pain: Yes, NSG notified  Pain Assessment: 0-10  Pain Level: 3  Pain Location: Knee  Pain Orientation: Right  Vital Signs  Patient Currently in Pain: Yes   Orientation     Objective    ADL  Equipment Provided: Reacher;Sock aid (Ed given on reacher and use of sock aid this date with fair understanding. Pt able to don socks (I) without sock aid. )  UE Dressing: Minimal assistance (To leola lopez difficulty manipulating IV port through sleeve.  Pt donned while standing with CGA-SBA. )  LE Dressing: Stand by assistance (Pt donned/doffed socks with SBA.)        Balance  Standing Balance: Contact guard assistance  Standing Balance  Sit to stand: Contact guard assistance  Stand to sit: Contact guard assistance  Functional Mobility  Functional - Mobility Device: Rolling Walker  Activity: Other

## 2017-12-18 NOTE — PLAN OF CARE
Problem: Nutrition  Goal: Optimal nutrition therapy  Outcome: Ongoing  Nutrition Problem: Altered GI function  Intervention: Food and/or Nutrient Delivery: Modify current diet (Add 2 gm sodium to diet)  Nutritional Goals: POI > 75% of meals

## 2017-12-18 NOTE — PROGRESS NOTES
Therapy Time   Individual Concurrent Group Co-treatment   Time In 0945         Time Out 0033 814 [address]

## 2017-12-18 NOTE — CONSULTS
Patient: Harry Mckeon  : 1925  Date of Admission: 12/15/2017  Primary Care Physician: Cynthia Morris  Today's Date: 2017    REASON FOR CONSULTATION: Acute and chronic diastolic CHF, NYHA class IV. HPI: Ms. Homar Castelan is a 80 y.o. female who was admitted to the hospital with increased shortness of breath on exertion. During her hospitalization, an echocardiogram was performed which showed at least moderate diastolic dysfunction as well as moderate to severe mitral regurgitation, moderate tricuspid regurgitation and moderate pulmonary hypertension leading to this consultation. Ms. Homar Castelan denies any history of known history of coronary artery disease. She does have history of normal cardiac catheterization in . Patient also has stage IV chronic kidney disease. She initially presented with worsening shortness of breath and bilateral lower extremity swelling, found to have acute and chronic CHF. She denies any chest pain, pressure or tightness. She is short of breath at rest with orthopnea and PND (she has used 5 pillows prior to hospitalization but was not able to sleep). No palpitations, dizziness or lightheadedness. She is doing much better since admission. Repeat echo is unchanged from prior. Negative fluid balance of almost 3 L since admission. Past Medical History:   Diagnosis Date    Headache(784.0)     migraines    History of thyroid cancer     Hyperlipidemia     Hypertension     Macular degeneration     Osteoporosis     TIA (transient ischemic attack)     Type II or unspecified type diabetes mellitus without mention of complication, not stated as uncontrolled        CURRENT ALLERGIES: Review of patient's allergies indicates no known allergies. REVIEW OF SYSTEMS: 14 systems were reviewed. Pertinent positives and negatives as above, all else negative.      Past Surgical History:   Procedure Laterality Date    ANKLE SURGERY Left     CHOLECYSTECTOMY      Oral Nightly    sodium chloride flush  10 mL Intravenous 2 times per day    famotidine  20 mg Oral Daily    insulin lispro  0-12 Units Subcutaneous TID WC    insulin lispro  0-6 Units Subcutaneous Nightly    enoxaparin  30 mg Subcutaneous Daily         FAMILY HISTORY: family history includes Heart Disease in her father; Stroke in her mother. PHYSICAL EXAM:   /62   Pulse 73   Temp 97.4 °F (36.3 °C) (Temporal)   Resp 16   Ht 5' 1\" (1.549 m)   Wt 154 lb 12.8 oz (70.2 kg)   SpO2 97%   BMI 29.25 kg/m²  Body mass index is 29.25 kg/m². Constitutional: She is oriented to person, place, and time. She appears well-developed and well-nourished. In no acute distress. HEENT: Normocephalic and atraumatic. No JVD present. Carotid bruit is not present. No mass and no thyromegaly present. No lymphadenopathy present. Cardiovascular: Normal rate, regular rhythm, normal heart sounds and intact distal pulses. Exam reveals no gallop and no friction rub. A I/VI systolic murmur was heard at the base of the heart without significant radiation. III/VI systolic murmur heard maximally at the cardiac apex. Pulmonary/Chest:scattered medium-sized crackles bilaterally. No significant wheezes. No rhonchi. Abdominal: Soft, non-tender. Bowel sounds and aorta are normal. She exhibits no organomegaly, mass or bruit. Extremities: no significant edema. No cyanosis, or clubbing. Pulses are 2+ radial/carotid/dorsalis pedis and posterior tibial bilaterally. Neurological: She is alert and oriented to person, place, and time. No evidence of gross cranial nerve deficit. Coordination appeared normal.   Skin: Skin is warm and dry. There is no rash or diaphoresis. Psychiatric: She has a normal mood and affect.  Her speech is normal and behavior is normal.      MOST RECENT LABS ON RECORD:   Lab Results   Component Value Date    WBC 7.5 12/15/2017    HGB 9.2 (L) 12/15/2017    HCT 28.9 (L) 12/15/2017     12/15/2017    CHOL 114 05/10/2017    TRIG 103 05/10/2017    HDL 33 (L) 05/10/2017    ALT 9 12/15/2017    AST 13 12/15/2017     12/18/2017    K 3.9 12/18/2017    CL 96 (L) 12/18/2017    CREATININE 1.86 (H) 12/18/2017    BUN 50 (H) 12/18/2017    CO2 32 (H) 12/18/2017    TSH 4.92 12/15/2017    INR 0.9 08/31/2015    LABA1C 6.3 (H) 12/15/2017    LABMICR 1,211 (H) 11/13/2017       ASSESSMENT:  Patient Active Problem List    Diagnosis Date Noted    Community acquired pneumonia of left lower lobe of lung ([facility] 11/22/2017     Priority: High     Class: Acute    Acute on chronic combined systolic and diastolic CHF (congestive heart failure) ([address] 12/15/2017    Acute on chronic renal failure (HCC) 11/22/2017    Acute hyperkalemia 11/22/2017    Hypoxia 11/22/2017    Chronic diastolic heart failure (HCC)     Severe mitral regurgitation by prior echocardiogram     Chronic obstructive pulmonary disease ([address] 04/06/2017    Debility 04/03/2017    Anemia 04/03/2017    Malignant neoplasm of thyroid gland ([facility] 02/11/2015    Renal failure 02/11/2015    Calculus of kidney 02/11/2015    Anemia 02/11/2015    Transient cerebral ischemia 02/11/2015    Asthma 02/11/2015    Backache 02/11/2015    Senile nuclear sclerosis 02/02/2015    Hypertension 08/25/2014    Type 2 diabetes with nephropathy ([facility] 08/25/2014     Class: Chronic    Hyperlipidemia 08/25/2014    Left shoulder pain 08/25/2014       PLAN:  Acute on Chronic Diastolic Heart Failure:  · Most likely secondary to valvular heart disease and pulmonary hypertension. · Improving with diuresis, negative fluid balance of almost 3 L since admission. · Breathing as well as lower extremity swelling improved. ACE Inibitor/ARB: cannot be used giving advanced kidney disease. · Diuretics: Continue furosemide (lasix) 20 mg once daily. I told her to watch for any increased lightheadedness or dizziness and call if this develops.   · Nonpharmacologic management of Heart Failure:  I advised her to try and keep his legs up whenever possible and to limit salt in her diet. · Additional Testing: Echo reviewed with, grossly unchanged compared to prior ultrasound. Patient is frail too frail for corrective valve surgery. · Continue aspirin and Lipitor giving diabetes. · Blood pressure is controlled. · Can be discharged tomorrow on chronic medications. Once again, thank you for allowing me to participate in this patients care. Please do not hesitate to contact me could I be of further assistance. Rajwinder Raymundo MD, F.A.C.C.   [facility] Cardiology Specialist, [address] [facility], [address]  Phone: [phone], Fax: [phone]     Based on the patients current medical conditions, I believe the risk of significant morbidity and mortality is: high

## 2017-12-18 NOTE — PROGRESS NOTES
RESPIRATORY ASSESSMENT PROTOCOL                                                                                              Patient Name: Lakia Salazar Room#: [id number] : 1925     Admitting diagnosis: Acute on chronic combined systolic and diastolic CHF (congestive heart failure) ([facility] [I50.43]       Medical History:   Past Medical History:   Diagnosis Date    Headache(784.0)     migraines    History of thyroid cancer     Hyperlipidemia     Hypertension     Macular degeneration     Osteoporosis     TIA (transient ischemic attack)     Type II or unspecified type diabetes mellitus without mention of complication, not stated as uncontrolled        PATIENT ASSESSMENT    LABORATORY DATA  Hematology:   Lab Results   Component Value Date    WBC 7.5 12/15/2017    RBC 3.43 12/15/2017    RBC 0 2016    HGB 9.2 12/15/2017    HCT 28.9 12/15/2017     12/15/2017     10/26/2011     Chemistry:  No results found for: PHART, FXI4ADT, PO2ART, O3AKVKMQ, NBR9FXE, PBEA      VITALS  Pulse: 73   Resp: 16  BP: 126/62  SpO2: 97 % O2 Device: None (Room air)  Temp: 97.4 °F (36.3 °C)    SKIN COLOR  [x] Normal  [] Pale  [] Dusky  [] Cyanotic    RESPIRATORY PATTERN  [x] Normal  [] Dyspnea  [] Cheyne-Collado  [] Kussmaul  [] Biots    AMBULATORY  [] Yes  [] No  [] With Assistance      Patient Acuity 0 1 2 3 4 Score   Level of Concious (LOC) [x]  Alert & Oriented or Pt normal LOC []  Confused;follows directions []  Confused & uncooper-ative []  Obtunded []  Comatose 0   Respiratory Rate  (RR) [x]  Reg. rate & pattern. 12 - 20 bpm  []  Increased RR. Greater than 20 bpm   []  SOB w/ exertion or RR greater than 24 bpm []  Access- ory muscle use at rest. Abn.  resp. []  SOB at rest.   0   Bilateral Breath Sounds (BBS) [x]  Clear []  Diminish-ed bases  []  Diminish-ed t/o, or rales   []  Sporadic, scattered wheezes or rhonchi []  Persistentwheezes and, or absent BBS 0   Cough [x]  Strong, effective, & non-prod.

## 2017-12-18 NOTE — PROGRESS NOTES
Patient prefers that IV not to be restarted at this time. She stated maybe after she washes up and eats breakfast. Will pass information on to oncoming shift RN.

## 2017-12-18 NOTE — PLAN OF CARE
Problem: Pain:  Goal: Pain level will decrease  Pain level will decrease   Outcome: Ongoing  Pain scale of 0-10 being used to assess pain. Patient rates pain at 0/10, denies at this time. Pain will be assessed with hourly rounding and medication administered as ordered. Problem: Falls - Risk of  Goal: Absence of falls  Outcome: Ongoing  Patient is alert and oriented and has demonstrated the use of using the call light for assistance before getting up. Education has been provided to defer the use of the bed alarm and/or restraint free alarm as the patient has shown competency in calling for assistance and verbalizing the risk. Problem: Risk for Impaired Skin Integrity  Goal: Tissue integrity - skin and mucous membranes  Structural intactness and normal physiological function of skin and  mucous membranes. Outcome: Ongoing  Patient self turn in bed. No open areas noted. Augustine scale complete. Will continue to monitor. Problem: Cardiac Output - Decreased:  Goal: Hemodynamic stability will improve  Hemodynamic stability will improve   Outcome: Ongoing  Telemetry monitor ordered. NSR noted. Cardiology on consult. Echo scheduled for 12/18. Problem: Fluid Volume - Imbalance:  Goal: Absence of imbalanced fluid volume signs and symptoms  Absence of imbalanced fluid volume signs and symptoms   Outcome: Ongoing  IV lock. Lasix daily. Intake and output recorded along with daily weight. Problem: SAFETY  Goal: LTG - Patient will demonstrate safety requirements appropriate to situation/environment  Outcome: Ongoing  Patient remain free from injury. Fall risk assessment complete. Bed in low position with bed wheels locked and bed alarm on. Call light within reach. ID band and fall band on. Problem: Airway Clearance - Ineffective:  Goal: Ability to maintain a clear airway will improve  Ability to maintain a clear airway will improve   Outcome: Ongoing  No dyspnea noted.  Respirations symmetrical and unlabored. Lung sounds diminished/clear. Problem: Gas Exchange - Impaired:  Goal: Levels of oxygenation will improve  Levels of oxygenation will improve   Outcome: Ongoing  Pulse ox greater than 92% on room air.

## 2017-12-18 NOTE — PROGRESS NOTES
Physical Therapy  [facility]  Daily Treatment Note  NAME: Marylee Sanders  : 1925  MRN: [id number]    Date of Service: 2017    Patient Diagnosis(es):   Patient Active Problem List    Diagnosis Date Noted    Community acquired pneumonia of left lower lobe of lung ([facility] 2017     Priority: High     Class: Acute    Acute on chronic combined systolic and diastolic CHF (congestive heart failure) ([address] 12/15/2017    Acute on chronic renal failure ([address] 2017    Acute hyperkalemia 2017    Hypoxia 2017    Chronic diastolic heart failure (HCC)     Severe mitral regurgitation by prior echocardiogram     Chronic obstructive pulmonary disease ([address] 2017    Debility 2017    Anemia 2017    Malignant neoplasm of thyroid gland ([address] 2015    Renal failure 2015    Calculus of kidney 2015    Anemia 2015    Transient cerebral ischemia 2015    Asthma 2015    Backache 2015    Senile nuclear sclerosis 2015    Hypertension 2014    Type 2 diabetes with nephropathy ([address] 2014     Class: Chronic    Hyperlipidemia 2014    Left shoulder pain 2014       Past Medical History:   Diagnosis Date    Headache(784.0)     migraines    History of thyroid cancer     Hyperlipidemia     Hypertension     Macular degeneration     Osteoporosis     TIA (transient ischemic attack)     Type II or unspecified type diabetes mellitus without mention of complication, not stated as uncontrolled      Past Surgical History:   Procedure Laterality Date    ANKLE SURGERY Left     CHOLECYSTECTOMY      HYSTERECTOMY      SPINE SURGERY      THYROIDECTOMY         Restrictions  Restrictions/Precautions  Restrictions/Precautions: Cardiac, Fall Risk, General Precautions  Subjective   General  Chart Reviewed: Yes  Subjective  Subjective: Pt reported 3/10 R knee pain.    Pain Screening  Patient Currently in Pain: Yes  Pain Assessment  Pain Assessment: 0-10  Pain Level: 4  Pain Type: Acute pain;Chronic pain  Pain Location: Knee  Pain Orientation: Right  Vital Signs  Patient Currently in Pain: Yes       Orientation  Orientation  Overall Orientation Status: Within Normal Limits  Objective      Transfers  Sit to Stand: Contact guard assistance  Stand to sit: Contact guard assistance  Ambulation  Ambulation?: Yes  Ambulation 1  Surface: level tile  Device: Rolling Walker  Assistance: Contact guard assistance  Distance: 350 feet x 1  Comments: Minimal cues for posture and safety with AD. Stairs/Curb  Stairs?: No     Balance  Posture: Fair  Sitting - Static: Good  Sitting - Dynamic: Good  Standing - Static: Fair;+  Standing - Dynamic: Fair;+  Exercises  Straight Leg Raise: 15x  Quad Sets: 15x  Heelslides: 15x  Gluteal Sets: 15x  Hip Flexion: 15x  Hip Abduction: 15x  Knee Long Arc Quad: 15x  Knee Short Arc Quad: 15x  Ankle Pumps: 15x  Comments: Above exercises completed in supine. Assessment      Patient Education: Educated pt on safety with transfers/amb. Pt with good understanding. REQUIRES PT FOLLOW UP: Yes  Activity Tolerance  Activity Tolerance: Patient Tolerated treatment well       Discharge Recommendations:  Home with assist PRN    G-Code     OutComes Score    AM-PAC Score             Goals  Short term goals  Time Frame for Short term goals: IND GAIT [employer]  Short term goal 1: IND TRANSFERS  Long term goals  Time Frame for Long term goals : 4 WEEKS  Long term goal 1: IND GAIT [facility]. Patient Goals   Patient goals : 3 DAYS    Plan    Plan  Times per day: Twice a day  Current Treatment Recommendations: Strengthening, ROM, Balance Training, Functional Mobility Training, Transfer Training, Gait Training, Safety Education & Training, Patient/Caregiver Education & Training, Home Exercise Program  Safety Devices  Type of devices:  All fall risk precautions in place, Left in chair, Call light within reach,

## 2017-12-19 VITALS
WEIGHT: 156.2 LBS | TEMPERATURE: 98.5 F | RESPIRATION RATE: 16 BRPM | OXYGEN SATURATION: 93 % | SYSTOLIC BLOOD PRESSURE: 155 MMHG | HEIGHT: 61 IN | HEART RATE: 70 BPM | BODY MASS INDEX: 29.49 KG/M2 | DIASTOLIC BLOOD PRESSURE: 76 MMHG

## 2017-12-19 PROBLEM — N18.30 CKD (CHRONIC KIDNEY DISEASE) STAGE 3, GFR 30-59 ML/MIN (HCC): Chronic | Status: ACTIVE | Noted: 2017-12-19

## 2017-12-19 LAB
GLUCOSE BLD-MCNC: 129 MG/DL (ref 74–100)
GLUCOSE BLD-MCNC: 245 MG/DL (ref 74–100)

## 2017-12-19 PROCEDURE — 6370000000 HC RX 637 (ALT 250 FOR IP): Performed by: INTERNAL MEDICINE

## 2017-12-19 PROCEDURE — 97110 THERAPEUTIC EXERCISES: CPT

## 2017-12-19 PROCEDURE — 82947 ASSAY GLUCOSE BLOOD QUANT: CPT

## 2017-12-19 PROCEDURE — 6360000002 HC RX W HCPCS: Performed by: INTERNAL MEDICINE

## 2017-12-19 PROCEDURE — 97535 SELF CARE MNGMENT TRAINING: CPT

## 2017-12-19 RX ORDER — FUROSEMIDE 20 MG/1
20 TABLET ORAL DAILY
Qty: 60 TABLET | Refills: 3 | Status: SHIPPED | OUTPATIENT
Start: 2017-12-19 | End: 2018-08-28 | Stop reason: SDUPTHER

## 2017-12-19 RX ORDER — POTASSIUM CHLORIDE 750 MG/1
10 TABLET, EXTENDED RELEASE ORAL DAILY
Qty: 60 TABLET | Refills: 3 | Status: SHIPPED | OUTPATIENT
Start: 2017-12-19 | End: 2018-12-26 | Stop reason: SDUPTHER

## 2017-12-19 RX ADMIN — FUROSEMIDE 20 MG: 20 TABLET ORAL at 07:44

## 2017-12-19 RX ADMIN — ALLOPURINOL 300 MG: 300 TABLET ORAL at 07:44

## 2017-12-19 RX ADMIN — ENOXAPARIN SODIUM 30 MG: 30 INJECTION SUBCUTANEOUS at 07:45

## 2017-12-19 RX ADMIN — Medication 12000 UNITS: at 07:49

## 2017-12-19 RX ADMIN — GABAPENTIN 100 MG: 100 CAPSULE ORAL at 07:45

## 2017-12-19 RX ADMIN — FERROUS SULFATE TAB 325 MG (65 MG ELEMENTAL FE) 325 MG: 325 (65 FE) TAB at 07:44

## 2017-12-19 RX ADMIN — AMLODIPINE BESYLATE 10 MG: 10 TABLET ORAL at 07:44

## 2017-12-19 RX ADMIN — MECLIZINE HYDROCHLORIDE 25 MG: 25 TABLET ORAL at 07:44

## 2017-12-19 RX ADMIN — FAMOTIDINE 20 MG: 20 TABLET ORAL at 07:44

## 2017-12-19 RX ADMIN — CLOPIDOGREL BISULFATE 75 MG: 75 TABLET ORAL at 07:44

## 2017-12-19 RX ADMIN — LEVOTHYROXINE SODIUM 88 MCG: 88 TABLET ORAL at 07:44

## 2017-12-19 ASSESSMENT — PAIN SCALES - GENERAL
PAINLEVEL_OUTOF10: 0
PAINLEVEL_OUTOF10: 0

## 2017-12-19 NOTE — DISCHARGE SUMMARY
movement, no respiratory distress  COR: regular rate & rhythm and 2/6 systolic murmur  ABD:  soft, non-tender, non-distended, normal bowel sounds, no masses or organomegaly  EXT:   no cyanosis, clubbing or edema present    NEURO: negative  SKIN:  no rashes or significant lesions  -----------------------------------------------------------------          Hospital Course: The patient was admitted for the above. She was treated with IV lasix and improved over the course of her hospitalization. Kidney function declined a little with the diuresis, but was stable at the time of discharge. Consultants:    · cardiology    Procedures:    · none    Complications:   · none    Significant Diagnostic Studies:   Xr Chest Standard (2 Vw)    Result Date: 12/15/2017  REPORT: Chest PA and lateral INDICATION: Shortness of breath FINDINGS: Pulmonary vascular congestion is seen throughout both lungs. Trace right and small left pleural effusions. Cardiomegaly. No free intraperitoneal air. Degenerative changes thoracic spine. Final report electronically signed by Nel Alberts on 12/15/2017 3:46 PM    CHF with effusions    Xr Chest Standard (2 Vw)    Result Date: 11/22/2017  REPORT: Chest PA and lateral INDICATION: Leg swelling, bronchitis, wheezing, cough FINDINGS: Patchy consolidation and probable small effusion of the left lower lobe concerning for pneumonia. There is mild diffuse interstitial thickening throughout both lungs. Cardiomegaly. No free intraperitoneal air. Degenerative changes and osteopenia thoracic spine.  Final report electronically signed by Nel Alberts on 11/22/2017 10:23 AM    Suspect developing left lower lobe pneumonia    Recent Results (from the past 96 hour(s))   EKG 12 Lead    Collection Time: 12/15/17  2:25 PM   Result Value Ref Range    Ventricular Rate 77 BPM    Atrial Rate 77 BPM    P-R Interval 144 ms    QRS Duration 96 ms    Q-T Interval 380 ms    QTc Calculation (Bazett) 430 ms    P Axis 56 degrees R Axis 63 degrees    T Axis -11 degrees   CBC Auto Differential    Collection Time: 12/15/17  2:35 PM   Result Value Ref Range    WBC 7.5 3.5 - 11.0 k/uL    RBC 3.43 (L) 4.0 - 5.2 m/uL    Hemoglobin 9.2 (L) 12.0 - 16.0 g/dL    Hematocrit 28.9 (L) 36 - 46 %    MCV 84.0 80 - 100 fL    MCH 26.7 26 - 34 pg    MCHC 31.8 31 - 37 g/dL    RDW 17.1 (H) 12.1 - 15.2 %    Platelets 992 710 - 818 k/uL    MPV 9.4 6.0 - 12.0 fL    Differential Type NOT REPORTED     Seg Neutrophils 81 (H) 36 - 66 %    Lymphocytes 13 (L) 24 - 44 %    Monocytes 4 0 - 12 %    Eosinophils % 2 0 - 8 %    Basophils 0 0 - 2 %    Immature Granulocytes NOT REPORTED 0 %    Segs Absolute 6.00 1.8 - 7.7 k/uL    Absolute Lymph # 1.00 1.0 - 4.8 k/uL    Absolute Mono # 0.30 0.0 - 1.0 k/uL    Absolute Eos # 0.20 0.0 - 0.4 k/uL    Basophils # 0.00 0.0 - 0.2 k/uL    Absolute Immature Granulocyte NOT REPORTED 0.00 - 0.30 k/uL    WBC Morphology NOT REPORTED     RBC Morphology NOT REPORTED     Platelet Estimate NOT REPORTED    Comprehensive Metabolic Panel    Collection Time: 12/15/17  2:35 PM   Result Value Ref Range    Glucose 158 (H) 70 - 99 mg/dL    BUN 38 (H) 8 - 23 mg/dL    CREATININE 1.53 (H) 0.50 - 0.90 mg/dL    Bun/Cre Ratio 25 (H) 9 - 20    Calcium 8.6 8.6 - 10.4 mg/dL    Sodium 139 135 - 144 mmol/L    Potassium 3.8 3.7 - 5.3 mmol/L    Chloride 101 98 - 107 mmol/L    CO2 25 20 - 31 mmol/L    Anion Gap 13 9 - 17 mmol/L    Alkaline Phosphatase 86 35 - 104 U/L    ALT 9 5 - 33 U/L    AST 13 <32 U/L    Total Bilirubin 0.41 0.3 - 1.2 mg/dL    Total Protein 7.5 6.4 - 8.3 g/dL    Alb 3.8 3.5 - 5.2 g/dL    Albumin/Globulin Ratio 1.0 1.0 - 2.5    GFR Non- 32 (L) >60 mL/min    GFR  38 (L) >60 mL/min    GFR Comment          GFR Staging         Culture Blood #1    Collection Time: 12/15/17  2:35 PM   Result Value Ref Range    Specimen Description . BLOOD     Special Requests  right forarm 20ml     Culture NO GROWTH 3 DAYS     Culture Collection Time: 12/16/17  9:14 PM   Result Value Ref Range    POC Glucose 165 (H) 74 - 100 mg/dL   Basic metabolic panel    Collection Time: 12/17/17  5:20 AM   Result Value Ref Range    Glucose 124 (H) 70 - 99 mg/dL    BUN 46 (H) 8 - 23 mg/dL    CREATININE 1.82 (H) 0.50 - 0.90 mg/dL    Bun/Cre Ratio 25 (H) 9 - 20    Calcium 9.0 8.6 - 10.4 mg/dL    Sodium 140 135 - 144 mmol/L    Potassium 4.1 3.7 - 5.3 mmol/L    Chloride 97 (L) 98 - 107 mmol/L    CO2 31 20 - 31 mmol/L    Anion Gap 12 9 - 17 mmol/L    GFR Non-African American 26 (L) >60 mL/min    GFR  31 (L) >60 mL/min    GFR Comment          GFR Staging         Glucose, Whole Blood    Collection Time: 12/17/17  8:03 AM   Result Value Ref Range    POC Glucose 118 (H) 74 - 100 mg/dL   Glucose, Whole Blood    Collection Time: 12/17/17 11:05 AM   Result Value Ref Range    POC Glucose 257 (H) 74 - 100 mg/dL   Glucose, Whole Blood    Collection Time: 12/17/17  4:25 PM   Result Value Ref Range    POC Glucose 116 (H) 74 - 100 mg/dL   Glucose, Whole Blood    Collection Time: 12/17/17  7:59 PM   Result Value Ref Range    POC Glucose 165 (H) 74 - 100 mg/dL   Basic metabolic panel    Collection Time: 12/18/17  5:49 AM   Result Value Ref Range    Glucose 118 (H) 70 - 99 mg/dL    BUN 50 (H) 8 - 23 mg/dL    CREATININE 1.86 (H) 0.50 - 0.90 mg/dL    Bun/Cre Ratio 27 (H) 9 - 20    Calcium 8.7 8.6 - 10.4 mg/dL    Sodium 140 135 - 144 mmol/L    Potassium 3.9 3.7 - 5.3 mmol/L    Chloride 96 (L) 98 - 107 mmol/L    CO2 32 (H) 20 - 31 mmol/L    Anion Gap 12 9 - 17 mmol/L    GFR Non-African American 25 (L) >60 mL/min    GFR  31 (L) >60 mL/min    GFR Comment          GFR Staging         Glucose, Whole Blood    Collection Time: 12/18/17  7:02 AM   Result Value Ref Range    POC Glucose 132 (H) 74 - 100 mg/dL   Glucose, Whole Blood    Collection Time: 12/18/17 11:05 AM   Result Value Ref Range    POC Glucose 276 (H) 74 - 100 mg/dL   Glucose, Whole Blood Collection Time: 12/18/17  3:53 PM   Result Value Ref Range    POC Glucose 106 (H) 74 - 100 mg/dL   Glucose, Whole Blood    Collection Time: 12/18/17  7:48 PM   Result Value Ref Range    POC Glucose 432 (H) 74 - 100 mg/dL           Discharge Condition:   · good    Disposition:   · home    Discharge Medications:     Mary Cruz   Home Medication Instructions AAB:[id number]    Printed on:12/19/17 0640   Medication Information                      acetaminophen (TYLENOL) 500 MG tablet  Take 1,000 mg by mouth 3 times daily as needed for Pain             allopurinol (ZYLOPRIM) 300 MG tablet  Take 1 tablet by mouth daily             amLODIPine (NORVASC) 10 MG tablet  Take 1 tablet by mouth daily             clopidogrel (PLAVIX) 75 MG tablet  TAKE 1 TABLET BY MOUTH ONCE A DAY             ferrous sulfate 325 (65 FE) MG tablet  Take 325 mg by mouth daily (with breakfast). furosemide (LASIX) 20 MG tablet  Take 1 tablet by mouth daily             gabapentin (NEURONTIN) 100 MG capsule  TAKE 1 CAPSULE BY MOUTH THREE TIMES DAILY             glimepiride (AMARYL) 1 MG tablet  TAKE 1 TABLET BY MOUTH ONCE DAILY IN THE MORNING             hydrochlorothiazide (MICROZIDE) 12.5 MG capsule  Take 12.5 mg by mouth daily             ipratropium-albuterol (DUONEB) 0.5-2.5 (3) MG/3ML SOLN nebulizer solution  Inhale 3 mLs into the lungs 4 times daily             lactase (LACTAID) 3000 UNITS tablet  Take 4 tablets by mouth 3 times daily (with meals)              levothyroxine (SYNTHROID) 88 MCG tablet  Take 88 mcg by mouth daily              meclizine (ANTIVERT) 25 MG tablet  Take 25 mg by mouth daily              potassium chloride (KLOR-CON M) 10 MEQ extended release tablet  Take 1 tablet by mouth daily             rosuvastatin (CRESTOR) 5 MG tablet  TAKE 1 TABLET BY MOUTH ONCE A DAY             zolpidem (AMBIEN) 5 MG tablet  Take 5 mg by mouth nightly as needed for Sleep .                  · Resume all home medications unless otherwise directed  · Add lasix 20mg Qd, KCL 10Meq QD  · Stop taking     Patient Instructions:   · Activity: activity as tolerated  · Diet: cardiac diet and diabetic diet  · Wound Care: none needed  · Other:   · Follow up with Dr Yancy Henry in 1 week as directed    Marbin Altman on Discharge (if applicable)  ACE/ARB in CHF: No  ASA in MI: No  Statin in MI: Yes  Statin in CVA: No  Antiplatelet in CVA: No    Total time spent on discharge services: 35 minutes  Including the following activities:  · Evaluation and Management of patient  · Discussion with patient and/or surrogate about current care plan  · Coordination with Case Management and/or   · Coordination of care with Consultants (if applicable)   · Coordination of care with Receiving Facility Physician (if applicable)  · Completion of DME forms (if applicable)  · Preparation of Discharge Summary  · Preparation of Medication Reconciliation  · Preparation of Discharge Prescriptions        Signed:  Mayelin Haskins M.D.  12/19/2017  6:40 AM

## 2017-12-19 NOTE — PROGRESS NOTES
Patient would benefit from continued therapy after discharge     Plan   Plan  Times per week: 7  Times per day: Daily  Current Treatment Recommendations: Strengthening, Functional Mobility Training, Endurance Training, Self-Care / ADL  Plan Comment: ther-ex, ther-act, and self-care tasks. G-Code     OutComes Score                                           AM-PAC Score             Goals  Short term goals  Time Frame for Short term goals: 3-5 days  Short term goal 1: Pt to tolerate 15 minutes ther-ex/ther-act in order to improve strength and endurance for ADLs. Short term goal 2: Pt to tolerate >5 minutes dynamic standing during functional tasks with CGA. Short term goal 3: Pt to complete functional mobility tasks demonstrating G safety with LRAD. Short term goal 4: Pt to complete sink-side ADLs with min A. Long term goals  Time Frame for Long term goals : 4 weeks  Long term goal 1: Pt to complete ADL tasks with mod I in order to ensure safe return home. Patient Goals   Patient goals : Pt does not want to go to rehab, wants to go home.         Therapy Time   Individual Concurrent Group Co-treatment   Time In  920         Time Out  945         Minutes  REBECA Bruner

## 2017-12-19 NOTE — PLAN OF CARE
Problem: Pain:  Goal: Pain level will decrease  Pain level will decrease   Outcome: Ongoing  Pain scale of 0-10 being used to assess pain. Patient rates pain at 0/10, denies at this time. Pain will be assessed with hourly rounding and medication administered as ordered. Tylenol PRN    Problem: Falls - Risk of  Goal: Absence of falls  Outcome: Ongoing  Patient is alert and oriented and has demonstrated the use of using the call light for assistance before getting up. Education has been provided to defer the use of the bed alarm and/or restraint free alarm as the patient has shown competency in calling for assistance and verbalizing the risk. Problem: Risk for Impaired Skin Integrity  Goal: Tissue integrity - skin and mucous membranes  Structural intactness and normal physiological function of skin and  mucous membranes. Outcome: Ongoing  Patient able to turn self in bed. Augustine scale complete. No open areas noted. Will continue to monitor. Problem: Cardiac Output - Decreased:  Goal: Hemodynamic stability will improve  Hemodynamic stability will improve   Outcome: Ongoing  Cardiology on consult. Echo done today, EF 55%. Telemetry monitor on. Problem: Fluid Volume - Imbalance:  Goal: Absence of imbalanced fluid volume signs and symptoms  Absence of imbalanced fluid volume signs and symptoms   Outcome: Ongoing  Intake and output recorded along with daily weight. Lasix daily PO. No edema noted. Problem: SAFETY  Goal: LTG - Patient will demonstrate safety requirements appropriate to situation/environment  Outcome: Ongoing  Patient remain free from injury. Fall risk assessment complete. Bed in low position with bed wheels locked and bed alarm on. Call light within reach. ID band and fall band on. Problem: Gas Exchange - Impaired:  Goal: Levels of oxygenation will improve  Levels of oxygenation will improve   Outcome: Ongoing  Pulse ox reading greater than 92% on room air.  No dyspnea noted at rest or with exertion.

## 2017-12-20 ENCOUNTER — CARE COORDINATION (OUTPATIENT)
Dept: MEDSURG UNIT | Age: 82
End: 2017-12-20

## 2017-12-20 LAB
CULTURE: NORMAL
Lab: NORMAL
Lab: NORMAL
SPECIMEN DESCRIPTION: NORMAL
SPECIMEN DESCRIPTION: NORMAL
STATUS: NORMAL
STATUS: NORMAL

## 2017-12-20 NOTE — CARE COORDINATION
[facility] Transitions Initial Follow Up Call    Call within 2 business days of discharge: Yes    Patient: Edson Rivas Patient : 1925   MRN: <[id number]>  Reason for Admission: There are no discharge diagnoses documented for the most recent discharge. Discharge Date: 17 RARS: [facility] Risk Score: 23.5     Spoke with: Nat [address]: Leanne    Non-face-to-face services provided:  Obtained and reviewed discharge summary and/or continuity of care documents    Care Transitions 24 Hour Call    Do you have any ongoing symptoms?:  No  Do you have a copy of your discharge instructions?:  Yes  Do you have all of your prescriptions and are they filled?:  Yes  Have you been contacted by a [address]?:  No  Have you scheduled your follow up appointment?:  Yes  How are you going to get to your appointment?:  Car - family or friend to transport  Were you discharged with any Home Care or Post Acute Services:  No  Do you feel like you have everything you need to keep you well at home?:  Yes  Care Transitions Interventions       Spoke with Zulay Cruz for initial transitions call. Stated she is doing alright since discharge but gets charley horses in her legs during night that keeps her up and causes so much pain that she cries. Stated she has had these symptoms for some time. Stated she picked up new medications for lasix and Kcl and has no questions related to medications. Declined review of current medications. Denies CP, SB, palpitations. Stated she lives alone and [facility] [address] Pt stated she does have transportation to appointments and gets assistance with meal preparation. Reviewed upcoming PCP appt. For 18. Agreed to f/u transitions.     Kena Barragan RN BSN   Care Transitions Coordinator  [phone]     Follow Up  Future Appointments  Date Time Provider Jesus Bhagat   2018 11:15 AM Nery Stanton MD [address]   2018 1:00 PM Nery Stanton MD

## 2017-12-22 ENCOUNTER — APPOINTMENT (OUTPATIENT)
Dept: CT IMAGING | Age: 82
DRG: 389 | End: 2017-12-22
Payer: MEDICARE

## 2017-12-22 ENCOUNTER — HOSPITAL ENCOUNTER (EMERGENCY)
Age: 82
Discharge: HOME | DRG: 389 | End: 2017-12-23
Attending: EMERGENCY MEDICINE
Payer: MEDICARE

## 2017-12-22 DIAGNOSIS — N20.1 RIGHT DISTAL URETERAL CALCULUS: Primary | ICD-10-CM

## 2017-12-22 LAB
-: ABNORMAL
ABSOLUTE EOS #: 0.3 K/UL (ref 0–0.4)
ABSOLUTE IMMATURE GRANULOCYTE: ABNORMAL K/UL (ref 0–0.3)
ABSOLUTE LYMPH #: 1.8 K/UL (ref 1–4.8)
ABSOLUTE MONO #: 0.5 K/UL (ref 0–1)
ALBUMIN SERPL-MCNC: 4.2 G/DL (ref 3.5–5.2)
ALBUMIN/GLOBULIN RATIO: 0.9 (ref 1–2.5)
ALP BLD-CCNC: 116 U/L (ref 35–104)
ALT SERPL-CCNC: 14 U/L (ref 5–33)
AMORPHOUS: ABNORMAL
ANION GAP SERPL CALCULATED.3IONS-SCNC: 16 MMOL/L (ref 9–17)
AST SERPL-CCNC: 24 U/L
BACTERIA: ABNORMAL
BASOPHILS # BLD: 0 % (ref 0–2)
BASOPHILS ABSOLUTE: 0 K/UL (ref 0–0.2)
BILIRUB SERPL-MCNC: 0.31 MG/DL (ref 0.3–1.2)
BILIRUBIN URINE: NEGATIVE
BUN BLDV-MCNC: 46 MG/DL (ref 8–23)
BUN/CREAT BLD: 23 (ref 9–20)
CALCIUM SERPL-MCNC: 9.5 MG/DL (ref 8.6–10.4)
CASTS UA: ABNORMAL /LPF
CHLORIDE BLD-SCNC: 93 MMOL/L (ref 98–107)
CO2: 27 MMOL/L (ref 20–31)
COLOR: YELLOW
COMMENT UA: ABNORMAL
CREAT SERPL-MCNC: 2.02 MG/DL (ref 0.5–0.9)
CRYSTALS, UA: ABNORMAL /HPF
DIFFERENTIAL TYPE: ABNORMAL
EOSINOPHILS RELATIVE PERCENT: 3 % (ref 0–8)
EPITHELIAL CELLS UA: ABNORMAL /HPF (ref 0–25)
GFR AFRICAN AMERICAN: 28 ML/MIN
GFR NON-AFRICAN AMERICAN: 23 ML/MIN
GFR SERPL CREATININE-BSD FRML MDRD: ABNORMAL ML/MIN/{1.73_M2}
GFR SERPL CREATININE-BSD FRML MDRD: ABNORMAL ML/MIN/{1.73_M2}
GLUCOSE BLD-MCNC: 184 MG/DL (ref 70–99)
GLUCOSE URINE: NEGATIVE
HCT VFR BLD CALC: 37.4 % (ref 36–46)
HEMOGLOBIN: 11.9 G/DL (ref 12–16)
IMMATURE GRANULOCYTES: ABNORMAL %
KETONES, URINE: NEGATIVE
LEUKOCYTE ESTERASE, URINE: NEGATIVE
LIPASE: 21 U/L (ref 13–60)
LYMPHOCYTES # BLD: 16 % (ref 24–44)
MCH RBC QN AUTO: 26.8 PG (ref 26–34)
MCHC RBC AUTO-ENTMCNC: 31.8 G/DL (ref 31–37)
MCV RBC AUTO: 84.2 FL (ref 80–100)
MONOCYTES # BLD: 4 % (ref 0–12)
MUCUS: ABNORMAL
NITRITE, URINE: NEGATIVE
OTHER OBSERVATIONS UA: ABNORMAL
PDW BLD-RTO: 17.6 % (ref 12.1–15.2)
PH UA: 5 (ref 5–9)
PLATELET # BLD: 322 K/UL (ref 140–450)
PLATELET ESTIMATE: ABNORMAL
PMV BLD AUTO: 9.6 FL (ref 6–12)
POTASSIUM SERPL-SCNC: 4.5 MMOL/L (ref 3.7–5.3)
PROTEIN UA: ABNORMAL
RBC # BLD: 4.44 M/UL (ref 4–5.2)
RBC # BLD: ABNORMAL 10*6/UL
RBC UA: ABNORMAL /HPF (ref 0–2)
RENAL EPITHELIAL, UA: ABNORMAL /HPF
SEG NEUTROPHILS: 77 % (ref 36–66)
SEGMENTED NEUTROPHILS ABSOLUTE COUNT: 8.8 K/UL (ref 1.8–7.7)
SODIUM BLD-SCNC: 136 MMOL/L (ref 135–144)
SPECIFIC GRAVITY UA: 1.02 (ref 1.01–1.02)
TOTAL PROTEIN: 9 G/DL (ref 6.4–8.3)
TRICHOMONAS: ABNORMAL
TSH SERPL DL<=0.05 MIU/L-ACNC: 9 MIU/L (ref 0.3–5)
TURBIDITY: ABNORMAL
URINE HGB: NEGATIVE
UROBILINOGEN, URINE: NORMAL
WBC # BLD: 11.5 K/UL (ref 3.5–11)
WBC # BLD: ABNORMAL 10*3/UL
WBC UA: ABNORMAL /HPF (ref 0–5)
YEAST: ABNORMAL

## 2017-12-22 PROCEDURE — 96375 TX/PRO/DX INJ NEW DRUG ADDON: CPT

## 2017-12-22 PROCEDURE — 6360000002 HC RX W HCPCS: Performed by: EMERGENCY MEDICINE

## 2017-12-22 PROCEDURE — 84443 ASSAY THYROID STIM HORMONE: CPT

## 2017-12-22 PROCEDURE — 81001 URINALYSIS AUTO W/SCOPE: CPT

## 2017-12-22 PROCEDURE — 96374 THER/PROPH/DIAG INJ IV PUSH: CPT

## 2017-12-22 PROCEDURE — 74176 CT ABD & PELVIS W/O CONTRAST: CPT

## 2017-12-22 PROCEDURE — 83690 ASSAY OF LIPASE: CPT

## 2017-12-22 PROCEDURE — 80053 COMPREHEN METABOLIC PANEL: CPT

## 2017-12-22 PROCEDURE — 36415 COLL VENOUS BLD VENIPUNCTURE: CPT

## 2017-12-22 PROCEDURE — 99284 EMERGENCY DEPT VISIT MOD MDM: CPT

## 2017-12-22 PROCEDURE — 85025 COMPLETE CBC W/AUTO DIFF WBC: CPT

## 2017-12-22 RX ORDER — ONDANSETRON 2 MG/ML
4 INJECTION INTRAMUSCULAR; INTRAVENOUS ONCE
Status: COMPLETED | OUTPATIENT
Start: 2017-12-22 | End: 2017-12-22

## 2017-12-22 RX ORDER — MORPHINE SULFATE 10 MG/ML
4 INJECTION, SOLUTION INTRAMUSCULAR; INTRAVENOUS ONCE
Status: COMPLETED | OUTPATIENT
Start: 2017-12-22 | End: 2017-12-22

## 2017-12-22 RX ORDER — MORPHINE SULFATE 2 MG/ML
4 INJECTION, SOLUTION INTRAMUSCULAR; INTRAVENOUS ONCE
Status: DISCONTINUED | OUTPATIENT
Start: 2017-12-22 | End: 2017-12-22

## 2017-12-22 RX ADMIN — ONDANSETRON 4 MG: 2 INJECTION INTRAMUSCULAR; INTRAVENOUS at 21:00

## 2017-12-22 RX ADMIN — MORPHINE SULFATE 4 MG: 10 INJECTION, SOLUTION INTRAMUSCULAR; INTRAVENOUS at 21:00

## 2017-12-22 ASSESSMENT — PAIN DESCRIPTION - PAIN TYPE
TYPE: ACUTE PAIN
TYPE: ACUTE PAIN

## 2017-12-22 ASSESSMENT — PAIN DESCRIPTION - LOCATION
LOCATION: ABDOMEN
LOCATION: ABDOMEN

## 2017-12-22 ASSESSMENT — PAIN SCALES - GENERAL
PAINLEVEL_OUTOF10: 10
PAINLEVEL_OUTOF10: 10
PAINLEVEL_OUTOF10: 1

## 2017-12-23 ENCOUNTER — HOSPITAL ENCOUNTER (INPATIENT)
Age: 82
LOS: 1 days | Discharge: ANOTHER HOSPITAL | DRG: 389 | End: 2017-12-24
Attending: INTERNAL MEDICINE | Admitting: INTERNAL MEDICINE
Payer: MEDICARE

## 2017-12-23 VITALS
DIASTOLIC BLOOD PRESSURE: 70 MMHG | WEIGHT: 153 LBS | HEART RATE: 70 BPM | HEIGHT: 61 IN | RESPIRATION RATE: 12 BRPM | OXYGEN SATURATION: 95 % | TEMPERATURE: 97.8 F | BODY MASS INDEX: 28.89 KG/M2 | SYSTOLIC BLOOD PRESSURE: 111 MMHG

## 2017-12-23 PROBLEM — E86.0 DEHYDRATION: Status: ACTIVE | Noted: 2017-12-23

## 2017-12-23 LAB
-: ABNORMAL
AMORPHOUS: ABNORMAL
BACTERIA: ABNORMAL
BILIRUBIN URINE: NEGATIVE
CASTS UA: ABNORMAL /LPF
COLOR: YELLOW
COMMENT UA: ABNORMAL
CRYSTALS, UA: ABNORMAL /HPF
EPITHELIAL CELLS UA: ABNORMAL /HPF (ref 0–25)
ESTIMATED AVERAGE GLUCOSE: 137 MG/DL
GLUCOSE BLD-MCNC: 152 MG/DL (ref 74–100)
GLUCOSE BLD-MCNC: 157 MG/DL (ref 74–100)
GLUCOSE BLD-MCNC: 68 MG/DL (ref 74–100)
GLUCOSE BLD-MCNC: 80 MG/DL (ref 74–100)
GLUCOSE URINE: NEGATIVE
HBA1C MFR BLD: 6.4 % (ref 4.8–5.9)
KETONES, URINE: NEGATIVE
LEUKOCYTE ESTERASE, URINE: NEGATIVE
MUCUS: ABNORMAL
NITRITE, URINE: NEGATIVE
OTHER OBSERVATIONS UA: ABNORMAL
PH UA: 5.5 (ref 5–9)
PROTEIN UA: ABNORMAL
RBC UA: ABNORMAL /HPF (ref 0–2)
RENAL EPITHELIAL, UA: ABNORMAL /HPF
SPECIFIC GRAVITY UA: 1.02 (ref 1.01–1.02)
TRICHOMONAS: ABNORMAL
TURBIDITY: CLEAR
URINE HGB: NEGATIVE
UROBILINOGEN, URINE: NORMAL
WBC UA: ABNORMAL /HPF (ref 0–5)
YEAST: ABNORMAL

## 2017-12-23 PROCEDURE — 36415 COLL VENOUS BLD VENIPUNCTURE: CPT

## 2017-12-23 PROCEDURE — 1200000000 HC SEMI PRIVATE

## 2017-12-23 PROCEDURE — 2580000003 HC RX 258: Performed by: INTERNAL MEDICINE

## 2017-12-23 PROCEDURE — 87040 BLOOD CULTURE FOR BACTERIA: CPT

## 2017-12-23 PROCEDURE — 94664 DEMO&/EVAL PT USE INHALER: CPT

## 2017-12-23 PROCEDURE — 81001 URINALYSIS AUTO W/SCOPE: CPT

## 2017-12-23 PROCEDURE — 6370000000 HC RX 637 (ALT 250 FOR IP): Performed by: EMERGENCY MEDICINE

## 2017-12-23 PROCEDURE — 94760 N-INVAS EAR/PLS OXIMETRY 1: CPT

## 2017-12-23 PROCEDURE — 6370000000 HC RX 637 (ALT 250 FOR IP): Performed by: INTERNAL MEDICINE

## 2017-12-23 PROCEDURE — 87086 URINE CULTURE/COLONY COUNT: CPT

## 2017-12-23 PROCEDURE — 82947 ASSAY GLUCOSE BLOOD QUANT: CPT

## 2017-12-23 PROCEDURE — 6360000002 HC RX W HCPCS: Performed by: INTERNAL MEDICINE

## 2017-12-23 PROCEDURE — 83036 HEMOGLOBIN GLYCOSYLATED A1C: CPT

## 2017-12-23 RX ORDER — SODIUM CHLORIDE 0.9 % (FLUSH) 0.9 %
10 SYRINGE (ML) INJECTION PRN
Status: DISCONTINUED | OUTPATIENT
Start: 2017-12-23 | End: 2017-12-25 | Stop reason: HOSPADM

## 2017-12-23 RX ORDER — MECLIZINE HYDROCHLORIDE 25 MG/1
25 TABLET ORAL DAILY
Status: DISCONTINUED | OUTPATIENT
Start: 2017-12-23 | End: 2017-12-25 | Stop reason: HOSPADM

## 2017-12-23 RX ORDER — GABAPENTIN 100 MG/1
100 CAPSULE ORAL 2 TIMES DAILY
Status: DISCONTINUED | OUTPATIENT
Start: 2017-12-23 | End: 2017-12-25 | Stop reason: HOSPADM

## 2017-12-23 RX ORDER — DEXTROSE MONOHYDRATE 25 G/50ML
12.5 INJECTION, SOLUTION INTRAVENOUS PRN
Status: DISCONTINUED | OUTPATIENT
Start: 2017-12-23 | End: 2017-12-25 | Stop reason: HOSPADM

## 2017-12-23 RX ORDER — IPRATROPIUM BROMIDE AND ALBUTEROL SULFATE 2.5; .5 MG/3ML; MG/3ML
3 SOLUTION RESPIRATORY (INHALATION) 4 TIMES DAILY
Status: DISCONTINUED | OUTPATIENT
Start: 2017-12-23 | End: 2017-12-23

## 2017-12-23 RX ORDER — IPRATROPIUM BROMIDE AND ALBUTEROL SULFATE 2.5; .5 MG/3ML; MG/3ML
3 SOLUTION RESPIRATORY (INHALATION) 3 TIMES DAILY PRN
Status: DISCONTINUED | OUTPATIENT
Start: 2017-12-23 | End: 2017-12-25 | Stop reason: HOSPADM

## 2017-12-23 RX ORDER — DEXTROSE MONOHYDRATE 50 MG/ML
100 INJECTION, SOLUTION INTRAVENOUS PRN
Status: DISCONTINUED | OUTPATIENT
Start: 2017-12-23 | End: 2017-12-25 | Stop reason: HOSPADM

## 2017-12-23 RX ORDER — CIPROFLOXACIN 2 MG/ML
400 INJECTION, SOLUTION INTRAVENOUS EVERY 24 HOURS
Status: DISCONTINUED | OUTPATIENT
Start: 2017-12-23 | End: 2017-12-25 | Stop reason: HOSPADM

## 2017-12-23 RX ORDER — LEVOTHYROXINE SODIUM 88 UG/1
88 TABLET ORAL DAILY
Status: DISCONTINUED | OUTPATIENT
Start: 2017-12-23 | End: 2017-12-25 | Stop reason: HOSPADM

## 2017-12-23 RX ORDER — SODIUM CHLORIDE 9 MG/ML
INJECTION, SOLUTION INTRAVENOUS CONTINUOUS
Status: DISCONTINUED | OUTPATIENT
Start: 2017-12-23 | End: 2017-12-25 | Stop reason: HOSPADM

## 2017-12-23 RX ORDER — FAMOTIDINE 20 MG/1
20 TABLET, FILM COATED ORAL DAILY
Status: DISCONTINUED | OUTPATIENT
Start: 2017-12-23 | End: 2017-12-25 | Stop reason: HOSPADM

## 2017-12-23 RX ORDER — SODIUM CHLORIDE 0.9 % (FLUSH) 0.9 %
10 SYRINGE (ML) INJECTION EVERY 12 HOURS SCHEDULED
Status: DISCONTINUED | OUTPATIENT
Start: 2017-12-23 | End: 2017-12-25 | Stop reason: HOSPADM

## 2017-12-23 RX ORDER — ACETAMINOPHEN 325 MG/1
650 TABLET ORAL EVERY 4 HOURS PRN
Status: DISCONTINUED | OUTPATIENT
Start: 2017-12-23 | End: 2017-12-25 | Stop reason: HOSPADM

## 2017-12-23 RX ORDER — ONDANSETRON 4 MG/1
4 TABLET, ORALLY DISINTEGRATING ORAL EVERY 4 HOURS PRN
Qty: 12 TABLET | Refills: 0 | Status: SHIPPED | OUTPATIENT
Start: 2017-12-23

## 2017-12-23 RX ORDER — ONDANSETRON 2 MG/ML
4 INJECTION INTRAMUSCULAR; INTRAVENOUS EVERY 6 HOURS PRN
Status: DISCONTINUED | OUTPATIENT
Start: 2017-12-23 | End: 2017-12-25 | Stop reason: HOSPADM

## 2017-12-23 RX ORDER — AMLODIPINE BESYLATE 10 MG/1
10 TABLET ORAL DAILY
Status: DISCONTINUED | OUTPATIENT
Start: 2017-12-23 | End: 2017-12-25 | Stop reason: HOSPADM

## 2017-12-23 RX ORDER — FERROUS SULFATE 325(65) MG
325 TABLET ORAL
Status: DISCONTINUED | OUTPATIENT
Start: 2017-12-23 | End: 2017-12-25 | Stop reason: HOSPADM

## 2017-12-23 RX ORDER — ONDANSETRON 4 MG/1
8 TABLET, ORALLY DISINTEGRATING ORAL ONCE
Status: DISCONTINUED | OUTPATIENT
Start: 2017-12-23 | End: 2017-12-23 | Stop reason: HOSPADM

## 2017-12-23 RX ORDER — ATORVASTATIN CALCIUM 40 MG/1
40 TABLET, FILM COATED ORAL NIGHTLY
Status: DISCONTINUED | OUTPATIENT
Start: 2017-12-23 | End: 2017-12-24

## 2017-12-23 RX ORDER — CLOPIDOGREL BISULFATE 75 MG/1
75 TABLET ORAL DAILY
Status: DISCONTINUED | OUTPATIENT
Start: 2017-12-23 | End: 2017-12-25 | Stop reason: HOSPADM

## 2017-12-23 RX ORDER — CHOLECALCIFEROL (VITAMIN D3) 125 MCG
4 CAPSULE ORAL
Status: DISCONTINUED | OUTPATIENT
Start: 2017-12-23 | End: 2017-12-25 | Stop reason: HOSPADM

## 2017-12-23 RX ORDER — ACETAMINOPHEN 650 MG/1
650 SUPPOSITORY RECTAL EVERY 4 HOURS PRN
Status: DISCONTINUED | OUTPATIENT
Start: 2017-12-23 | End: 2017-12-25 | Stop reason: HOSPADM

## 2017-12-23 RX ORDER — NICOTINE POLACRILEX 4 MG
15 LOZENGE BUCCAL PRN
Status: DISCONTINUED | OUTPATIENT
Start: 2017-12-23 | End: 2017-12-25 | Stop reason: HOSPADM

## 2017-12-23 RX ORDER — HYDROCODONE BITARTRATE AND ACETAMINOPHEN 5; 325 MG/1; MG/1
1 TABLET ORAL EVERY 4 HOURS PRN
Qty: 12 TABLET | Refills: 0 | Status: ON HOLD | OUTPATIENT
Start: 2017-12-23 | End: 2017-12-29 | Stop reason: HOSPADM

## 2017-12-23 RX ADMIN — ENOXAPARIN SODIUM 30 MG: 30 INJECTION SUBCUTANEOUS at 12:40

## 2017-12-23 RX ADMIN — ACETAMINOPHEN 650 MG: 325 TABLET ORAL at 15:41

## 2017-12-23 RX ADMIN — INSULIN LISPRO 2 UNITS: 100 INJECTION, SOLUTION INTRAVENOUS; SUBCUTANEOUS at 12:40

## 2017-12-23 RX ADMIN — GABAPENTIN 100 MG: 100 CAPSULE ORAL at 12:39

## 2017-12-23 RX ADMIN — GABAPENTIN 100 MG: 100 CAPSULE ORAL at 20:03

## 2017-12-23 RX ADMIN — FERROUS SULFATE TAB 325 MG (65 MG ELEMENTAL FE) 325 MG: 325 (65 FE) TAB at 12:39

## 2017-12-23 RX ADMIN — LEVOTHYROXINE SODIUM 88 MCG: 88 TABLET ORAL at 12:39

## 2017-12-23 RX ADMIN — CIPROFLOXACIN 400 MG: 2 INJECTION, SOLUTION INTRAVENOUS at 12:40

## 2017-12-23 RX ADMIN — CLOPIDOGREL BISULFATE 75 MG: 75 TABLET ORAL at 12:40

## 2017-12-23 RX ADMIN — SODIUM CHLORIDE: 9 INJECTION, SOLUTION INTRAVENOUS at 12:06

## 2017-12-23 RX ADMIN — FAMOTIDINE 20 MG: 20 TABLET, FILM COATED ORAL at 12:39

## 2017-12-23 RX ADMIN — Medication 10 ML: at 12:40

## 2017-12-23 RX ADMIN — INSULIN LISPRO 2 UNITS: 100 INJECTION, SOLUTION INTRAVENOUS; SUBCUTANEOUS at 17:01

## 2017-12-23 RX ADMIN — AMLODIPINE BESYLATE 10 MG: 10 TABLET ORAL at 12:40

## 2017-12-23 RX ADMIN — Medication 12000 UNITS: at 17:02

## 2017-12-23 RX ADMIN — ATORVASTATIN CALCIUM 40 MG: 40 TABLET, FILM COATED ORAL at 20:03

## 2017-12-23 RX ADMIN — MECLIZINE HYDROCHLORIDE 25 MG: 25 TABLET ORAL at 13:08

## 2017-12-23 ASSESSMENT — PAIN SCALES - GENERAL
PAINLEVEL_OUTOF10: 0
PAINLEVEL_OUTOF10: 4

## 2017-12-23 ASSESSMENT — PAIN DESCRIPTION - PAIN TYPE: TYPE: ACUTE PAIN

## 2017-12-23 ASSESSMENT — PAIN DESCRIPTION - DESCRIPTORS: DESCRIPTORS: HEADACHE

## 2017-12-23 ASSESSMENT — PAIN DESCRIPTION - LOCATION: LOCATION: HEAD

## 2017-12-23 NOTE — PROGRESS NOTES
RESPIRATORY ASSESSMENT PROTOCOL                                                                                              Patient Name: Imani Gamble Room#: 1078/2276-03 : 1925     Admitting diagnosis: Dehydration [E86.0]       Medical History:   Past Medical History:   Diagnosis Date    Headache(784.0)     migraines    History of thyroid cancer     Hyperlipidemia     Hypertension     Macular degeneration     Osteoporosis     TIA (transient ischemic attack)     Type II or unspecified type diabetes mellitus without mention of complication, not stated as uncontrolled        PATIENT ASSESSMENT    LABORATORY DATA  Hematology:   Lab Results   Component Value Date    WBC 11.5 2017    RBC 4.44 2017    RBC 0 2016    HGB 11.9 2017    HCT 37.4 2017     2017     10/26/2011     Chemistry:  No results found for: PHART, WHK5QNM, PO2ART, W8WVWXIS, KXA6GFG, PBEA    Blood Culture:   Sputum Culture:     VITALS  Pulse: 73   Resp: 12  BP: (!) 160/76  SpO2: 95 % O2 Device: None (Room air)  Temp: 98.6 °F (37 °C)  Comment:   SKIN COLOR  [x] Normal  [] Pale  [] Dusky  [] Cyanotic      RESPIRATORY PATTERN  [x] Normal  [] Dyspnea  [] Cheyne-Collado  [] Kussmaul  [] Biots  AMBULATORY  [] Yes  [] No  [x] With Assistance    PEAK FLOW  Predicted:     Personal Best:        VITAL CAPACITY  Predicted value:  ml  Actual Value:  ml  30% of Predicted:  ml  Patient Acuity 0 1 2 3 4 Score   Level of Concious (LOC) [x]  Alert & Oriented or Pt normal LOC []  Confused;follows directions []  Confused & uncooper-ative []  Obtunded []  Comatose 0   Respiratory Rate  (RR) [x]  Reg. rate & pattern. 12 - 20 bpm  []  Increased RR.  Greater than 20 bpm   []  SOB w/ exertion or RR greater than 24 bpm []  Access- ory muscle use at rest. Abn.  resp. []  SOB at rest.   0   Bilateral Breath Sounds (BBS) []  Clear [x]  Diminish-ed bases  []  Diminish-ed t/o, or rales   []  Sporadic, scattered wheezes or rhonchi []  Persistentwheezes and, or absent BBS 1   Cough [x]  Strong, effective, & non-prod. []  Effective & prod. Less than 25 ml (2 TBSP) over past 24 hrs []  Ineffective & non-prod to less than 25 ML over past 24 hrs []  Ineffective and, or greater than 25 ml sputum prod. past 24 hrs. []  Nonspon- taneous; Requires suctioning 0   Pulmonary History  (PULM HX) [x]  No smoking and no chronic pulmonaryhistory []  Former smoker. Quit over 12 mos. ago []  Current smoker or quit w/ in 12 mos []  Pulm. History and, or 20 pk/yr smoking hx []  Admitted w/ acute pulm. dx and, or has been admitted w/ pulm. dx 2 or more times over past 12 mos 0   Surgical History this Admit  (SURG HX) [x]  No surgery []  General surgery []  Lower abdominal []  Thoracic or upper abdominal   []  Thoracic w/ pulm. disease 0   Chest X-Ray (CXR)/CT Scan []  Clear or not applicable [x]  Not available []  Atelect- asis or pleural effusions []  Localized infiltrate or pulm. edema []  Con-solidated Infiltrates, bilateral, or in more than 1 lobe 1   Slow or Forced VC, FEV1 OR PEFR (PULM FXN)  [x]  80% or greater, or not indicated []  Pt. unable to perform []  FEV1 or PEFR or VC 51-79%. []  FEV1 or PEFR or VC  30-49%   []  FEV1 or PEFR or VC less than 30%   0   TOTAL ACUITY: 2       CARE PLAN    If Acuity Level is 2, 3, or 4 in any of the following:    [] BILATERAL BREATH SOUNDS (BBS)     [] PULMONARY HISTORY (PULM HX)  [] PULMONARY FUNCTION (PULM FX)    Goal: Improve respiratory functions in patients with airway disease and decrease WOB    [x] AEROSOL PROTOCOL    Total Acuity:   16-32  []  Secondary Assessment in 24 hrs Total Acuity:  9-15  []  Secondary Assessment in 24 hrs Total Acuity:  4-8  []  Secondary Assessment in 48 hrs Total Acuity:  0-3  [x]  Secondary Assessment in 72 hrs   HHN AEROSOL THERAPY with  [physician-ordered bronchodilator(s)] q 4 & Albuterol PRN q2 hrs. Breath-Actuated Neb if BBS Acuity = 4, and pt. can use MP.  Notify

## 2017-12-23 NOTE — PLAN OF CARE
Problem: Falls - Risk of  Goal: Absence of falls  Outcome: Ongoing  Fall risk assessment done and patient is a high risk for falls. Alarms on as needed for patient safety. Patient being monitored on a regular basis. No falls noted at this time. Problem: DAILY CARE  Goal: Daily care needs are met  Outcome: Ongoing  Daily care needs are met with assistance with maximum encouragement being given       Problem: PAIN  Goal: Patient's pain/discomfort is manageable  Outcome: Ongoing  Patient able to verbalize when in pain and patient denies pain at this time. Pain assessed during assessments and as needed. Will continue to assess. Problem: SKIN INTEGRITY  Goal: Skin integrity is maintained or improved  Outcome: Ongoing  Patient is able to turn self while in the bed and can self regulate the bed as well. There are no new open areas or redness noted upon assessments. Will continue to assess       Problem: KNOWLEDGE DEFICIT  Goal: Patient/S.O. demonstrates understanding of disease process, treatment plan, medications, and discharge instructions. Outcome: Ongoing  Patient understands disease process, treatment plan, and medications.  Will continue to assess

## 2017-12-23 NOTE — PROGRESS NOTES
[facility] Department of Pharmacy   Pharmacy Renal Adjustment Note    Steph Valenzuela is a 80 y.o. female. Pharmacist assessment of renally cleared medications. Recent Labs      12/22/17 2035   CREATININE  2.02*     Estimated Creatinine Clearance: 16 mL/min (based on SCr of 2.02 mg/dL).     Height:   Ht Readings from Last 1 Encounters:   12/23/17 5' 1\" (1.549 m)     Weight:  Wt Readings from Last 1 Encounters:   12/23/17 152 lb (68.9 kg)       The following medication has been adjusted based upon renal function:             Famotidine 20 mg by mouth BID to famotidine 20 mg by mouth daily (for CrCl <50 mL/min)        Anita Delvalle,12/23/2017,11:32 AM

## 2017-12-23 NOTE — ED PROVIDER NOTES
for Pain     zolpidem (AMBIEN) 5 MG tablet Take 5 mg by mouth nightly as needed for Sleep .  ipratropium-albuterol (DUONEB) 0.5-2.5 (3) MG/3ML SOLN nebulizer solution Inhale 3 mLs into the lungs 4 times daily 360 mL    amLODIPine (NORVASC) 10 MG tablet Take 1 tablet by mouth daily 30 tablet 5   hydrochlorothiazide (MICROZIDE) 12.5 MG capsule Take 12.5 mg by mouth daily     rosuvastatin (CRESTOR) 5 MG tablet TAKE 1 TABLET BY MOUTH ONCE A DAY 30 tablet 5   gabapentin (NEURONTIN) 100 MG capsule TAKE 1 CAPSULE BY MOUTH THREE TIMES DAILY 90 capsule 5   glimepiride (AMARYL) 1 MG tablet TAKE 1 TABLET BY MOUTH ONCE DAILY IN THE MORNING 30 tablet 5   allopurinol (ZYLOPRIM) 300 MG tablet Take 1 tablet by mouth daily 90 tablet 3   lactase (LACTAID) 3000 UNITS tablet Take 4 tablets by mouth 3 times daily (with meals)      clopidogrel (PLAVIX) 75 MG tablet TAKE 1 TABLET BY MOUTH ONCE A DAY 30 tablet 5   levothyroxine (SYNTHROID) 88 MCG tablet Take 88 mcg by mouth daily      ferrous sulfate 325 (65 FE) MG tablet Take 325 mg by mouth daily (with breakfast).  meclizine (ANTIVERT) 25 MG tablet Take 25 mg by mouth daily         ALLERGIES    No Known Allergies    FAMILY HISTORY    Family History  Problem Relation Age of Onset   Stroke Mother    Heart Disease Father       SOCIAL HISTORY    Social History    Social History   Marital status:      Spouse name: N/A   Number of children: N/A   Years of education: N/A    Social History Main Topics   Smoking status: Never Smoker   Smokeless tobacco: Never Used   Alcohol use No   Drug use: No   Sexual activity: Not on file    Other Topics Concern   Not on file    Social History Narrative   No narrative on file      REVIEW OF SYSTEMS    Constitutional:  Denies fever, chills, weight loss or weakness   Eyes:  Denies photophobia or discharge   HENT:  Denies sore throat or ear pain   Respiratory:  Denies cough or shortness of breath   Cardiovascular:  Denies ABDOMEN PELVIS WO IV CONTRAST HISTORY:  Abdominal pain TECHNIQUE:  CT images are acquired through the Abdomen and Pelvis without IV contrast. Coronal and sagittal reformations are provided. PRIORS:  None FINDINGS:  Partially visualized intrathoracic contents are unremarkable. The liver,  pancreas, spleen, and adrenal glands are normal. The gallbladder is absent There is a 3.2 mm stone in the distal right ureter. No significant hydronephrosis of the right kidney identified. There are multiple other bilateral nonobstructive renal stones. There is bilateral renal cortical atrophy. Decompressed urinary bladder is unremarkable. There is diffuse prominent caliber of small bowel loops throughout the abdomen. Focal inflammatory fat stranding is seen at the terminal ileum. There is a diverticulosis of the sigmoid colon without convincing evidence of acute diverticulitis. Appendix is normal. There are atherosclerotic calcifications throughout the aorta. No aneurysms identified. Degenerative spondylosis is demonstrated throughout the spine. No acute osseous abnormality identified. Impression:  1. Focal inflammatory stranding seen surrounding the distal ilium. Correlate clinically for distal ileitis. 2. Multiple prominent loops of small bowel throughout the abdomen possibly represent ileus secondary to ileitis. Early or partial small bowel obstruction is not completely excluded, however no obvious transition point is identified. 3. Distal right ureteral vesicular junction stone without evidence of acute hydronephrosis of the right kidney. 4. Multiple other bilateral nonobstructive renal stones. Electronically Signed By: Arie Bland   on  12/22/2017  23:33    Xr Chest Standard (2 Vw)    Result Date: 12/15/2017  REPORT: Chest PA and lateral INDICATION: Shortness of breath FINDINGS: Pulmonary vascular congestion is seen throughout both lungs. Trace right and small left pleural effusions. Cardiomegaly.  No free intraperitoneal air.  Degenerative changes thoracic spine. Final report electronically signed by Jacob Harman on 12/15/2017 3:46 PM    CHF with effusions      PROCEDURES          ED COURSE & MEDICAL DECISION MAKING    Pertinent Labs & Imaging studies reviewed. (See chart for details)  Patient has significant relief with IV pain and nausea medication. Comprehensive metabolic panel shows glucose 184, BUN 46, creatinine 2.02, sodium 136, potassium 4.5. Lipase is within normal limits. Liver enzymes are within normal limits. CBC shows her blood count 11.5, hemoglobin 11.9.  TSH is within normal limits. Urinalysis is pending at this time. Urinalysis is essentially within normal limits. CT of abdomen and pelvis does show a 3.2 mm stone in the distal right ureter. The stone is near the ureterovesical junction. There is a question of a small bowel ileus. As results were discussed with the patient and her family. Test results were discussed with Dr. Kathleen Bauer.                     Labs Reviewed   COMPREHENSIVE METABOLIC PANEL - Abnormal; Notable for the following:        Result Value    Glucose 184 (*)     BUN 46 (*)     CREATININE 2.02 (*)     Bun/Cre Ratio 23 (*)     Chloride 93 (*)     Alkaline Phosphatase 116 (*)     Total Protein 9.0 (*)     Albumin/Globulin Ratio 0.9 (*)     GFR Non- 23 (*)     GFR  28 (*)     All other components within normal limits   CBC WITH AUTO DIFFERENTIAL - Abnormal; Notable for the following:     WBC 11.5 (*)     Hemoglobin 11.9 (*)     RDW 17.6 (*)     Seg Neutrophils 77 (*)     Lymphocytes 16 (*)     Segs Absolute 8.80 (*)     All other components within normal limits   UA W/REFLEX CULTURE - Abnormal; Notable for the following:     Turbidity UA SLIGHTLY CLOUDY (*)     Specific Gravity, UA 1.025 (*)     Protein, UA 2+ (*)     All other components within normal limits   TSH WITHOUT REFLEX - Abnormal; Notable for the following:     TSH 9.00 (*)     All other components within normal limits   MICROSCOPIC URINALYSIS - Abnormal; Notable for the following:     Amorphous, UA 2+ (*)     All other components within normal limits   LIPASE       Labs Reviewed - No data to display    FINAL IMPRESSION    Right Ureteral Calculus      Summation      Patient Course:        ED Medications administered this visit:    Medications   hydrocodone-acetaminophen (NORCO) tablet 5-325 mg (STARTER PACK) (not administered)   ondansetron (ZOFRAN-ODT) disintegrating tablet 8 mg (not administered)   ondansetron (ZOFRAN) injection 4 mg (4 mg Intravenous Given 12/22/17 2100)   morphine injection 4 mg (4 mg Intravenous Given 12/22/17 2100)       New Prescriptions from this visit:    New Prescriptions    HYDROCODONE-ACETAMINOPHEN (NORCO) 5-325 MG PER TABLET    Take 1 tablet by mouth every 4 hours as needed for Pain . ONDANSETRON (ZOFRAN ODT) 4 MG DISINTEGRATING TABLET    Take 1 tablet by mouth every 4 hours as needed for Nausea or Vomiting       Follow-up:  Oh Hickman MD  [facility] [address]  [phone]              Final Impression:   1.  Right distal ureteral calculus               (Please note that portions of this note were completed with a voice recognition program.  Efforts were made to edit the dictations but occasionally words are mis-transcribed.)       Landy Larsen DO  12/23/17 0030

## 2017-12-23 NOTE — H&P
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  Ears, nose, mouth, throat, and face: negative  Respiratory: negative for asthma, chronic bronchitis, cough, dyspnea on exertion, emphysema, hemoptysis, pleurisy/chest pain, pneumonia, shortness of breath, sputum, stridor and wheezing  Cardiovascular: negative for chest pain, chest pressure/discomfort, claudication, dyspnea, exertional chest pressure/discomfort, fatigue, irregular heart beat, lower extremity edema, near-syncope, orthopnea, palpitations, paroxysmal nocturnal dyspnea, syncope and tachypnea  Gastrointestinal: positive for abdominal pain and RLQ secondary to stone, negative for change in bowel habits, constipation, diarrhea, dyspepsia, dysphagia, jaundice, melena, nausea, odynophagia, reflux symptoms and vomiting  Genitourinary:positive for recently dxed stone  Integument/breast: negative for changed mole, dryness, rash, skin color change and skin lesion(s)  Hematologic/lymphatic: negative for bleeding, easy bruising, lymphadenopathy and petechiae  Musculoskeletal:positive for arthralgias, back pain, muscle weakness and stiff joints, negative for bone pain, myalgias and neck pain  Neurological: positive for coordination problems, gait problems, memory problems and weakness, negative for dizziness, headaches, paresthesia, seizures, speech problems, tremors and vertigo  Behavioral/Psych: negative  All other systems were reviewed with the patient and are negative except as stated  Physical Exam:    Vitals:   Vitals:    12/23/17 1046   BP: (!) 160/76   Pulse: 73   Resp: 12   Temp: 98.6 °F (37 °C)   SpO2: 95%     Weight: 152 lb (68.9 kg)   Height: 5' 1\" (154.9 cm)   GEN:   Not oriented to date and Not oriented to place, mild distress  EYES: No gross abnormalities.   NECK: normal, supple, no lymphadenopathy,  no carotid bruits  PULM: clear to auscultation bilaterally- no wheezes, rales or rhonchi, normal air movement, no respiratory distress  COR: regular rate &

## 2017-12-24 ENCOUNTER — APPOINTMENT (OUTPATIENT)
Dept: CT IMAGING | Age: 82
DRG: 389 | End: 2017-12-24
Attending: INTERNAL MEDICINE
Payer: MEDICARE

## 2017-12-24 VITALS
HEIGHT: 61 IN | OXYGEN SATURATION: 94 % | WEIGHT: 155.2 LBS | DIASTOLIC BLOOD PRESSURE: 61 MMHG | RESPIRATION RATE: 16 BRPM | SYSTOLIC BLOOD PRESSURE: 122 MMHG | TEMPERATURE: 98.7 F | BODY MASS INDEX: 29.3 KG/M2 | HEART RATE: 71 BPM

## 2017-12-24 PROBLEM — K56.609 SMALL BOWEL OBSTRUCTION (HCC): Status: ACTIVE | Noted: 2017-12-24

## 2017-12-24 LAB
ABSOLUTE EOS #: 0.4 K/UL (ref 0–0.4)
ABSOLUTE IMMATURE GRANULOCYTE: ABNORMAL K/UL (ref 0–0.3)
ABSOLUTE LYMPH #: 1.1 K/UL (ref 1–4.8)
ABSOLUTE MONO #: 0.3 K/UL (ref 0–1)
ALBUMIN SERPL-MCNC: 3 G/DL (ref 3.5–5.2)
ALBUMIN/GLOBULIN RATIO: 0.9 (ref 1–2.5)
ALP BLD-CCNC: 321 U/L (ref 35–104)
ALT SERPL-CCNC: 386 U/L (ref 5–33)
AMYLASE: 28 U/L (ref 28–100)
ANION GAP SERPL CALCULATED.3IONS-SCNC: 11 MMOL/L (ref 9–17)
AST SERPL-CCNC: 421 U/L
BASOPHILS # BLD: 0 % (ref 0–2)
BASOPHILS ABSOLUTE: 0 K/UL (ref 0–0.2)
BILIRUB SERPL-MCNC: 0.42 MG/DL (ref 0.3–1.2)
BUN BLDV-MCNC: 49 MG/DL (ref 8–23)
BUN/CREAT BLD: 21 (ref 9–20)
CALCIUM SERPL-MCNC: 8.1 MG/DL (ref 8.6–10.4)
CHLORIDE BLD-SCNC: 97 MMOL/L (ref 98–107)
CO2: 28 MMOL/L (ref 20–31)
CREAT SERPL-MCNC: 2.33 MG/DL (ref 0.5–0.9)
CULTURE: NORMAL
CULTURE: NORMAL
DIFFERENTIAL TYPE: ABNORMAL
EOSINOPHILS RELATIVE PERCENT: 6 % (ref 0–8)
GFR AFRICAN AMERICAN: 24 ML/MIN
GFR NON-AFRICAN AMERICAN: 20 ML/MIN
GFR SERPL CREATININE-BSD FRML MDRD: ABNORMAL ML/MIN/{1.73_M2}
GFR SERPL CREATININE-BSD FRML MDRD: ABNORMAL ML/MIN/{1.73_M2}
GLUCOSE BLD-MCNC: 105 MG/DL (ref 74–100)
GLUCOSE BLD-MCNC: 117 MG/DL (ref 74–100)
GLUCOSE BLD-MCNC: 147 MG/DL (ref 74–100)
GLUCOSE BLD-MCNC: 94 MG/DL (ref 74–100)
GLUCOSE BLD-MCNC: 97 MG/DL (ref 70–99)
HCT VFR BLD CALC: 28 % (ref 36–46)
HEMOGLOBIN: 8.7 G/DL (ref 12–16)
IMMATURE GRANULOCYTES: ABNORMAL %
LIPASE: 21 U/L (ref 13–60)
LYMPHOCYTES # BLD: 14 % (ref 24–44)
Lab: NORMAL
MCH RBC QN AUTO: 26.6 PG (ref 26–34)
MCHC RBC AUTO-ENTMCNC: 31.2 G/DL (ref 31–37)
MCV RBC AUTO: 85.3 FL (ref 80–100)
MONOCYTES # BLD: 4 % (ref 0–12)
PDW BLD-RTO: 17.4 % (ref 12.1–15.2)
PLATELET # BLD: 217 K/UL (ref 140–450)
PLATELET ESTIMATE: ABNORMAL
PMV BLD AUTO: 9.7 FL (ref 6–12)
POTASSIUM SERPL-SCNC: 4.8 MMOL/L (ref 3.7–5.3)
RBC # BLD: 3.28 M/UL (ref 4–5.2)
RBC # BLD: ABNORMAL 10*6/UL
SEG NEUTROPHILS: 76 % (ref 36–66)
SEGMENTED NEUTROPHILS ABSOLUTE COUNT: 5.8 K/UL (ref 1.8–7.7)
SODIUM BLD-SCNC: 136 MMOL/L (ref 135–144)
SPECIMEN DESCRIPTION: NORMAL
SPECIMEN DESCRIPTION: NORMAL
STATUS: NORMAL
TOTAL PROTEIN: 6.3 G/DL (ref 6.4–8.3)
WBC # BLD: 7.7 K/UL (ref 3.5–11)
WBC # BLD: ABNORMAL 10*3/UL

## 2017-12-24 PROCEDURE — 6360000002 HC RX W HCPCS: Performed by: INTERNAL MEDICINE

## 2017-12-24 PROCEDURE — G8987 SELF CARE CURRENT STATUS: HCPCS

## 2017-12-24 PROCEDURE — 85025 COMPLETE CBC W/AUTO DIFF WBC: CPT

## 2017-12-24 PROCEDURE — G8988 SELF CARE GOAL STATUS: HCPCS

## 2017-12-24 PROCEDURE — 36415 COLL VENOUS BLD VENIPUNCTURE: CPT

## 2017-12-24 PROCEDURE — 97166 OT EVAL MOD COMPLEX 45 MIN: CPT

## 2017-12-24 PROCEDURE — 74176 CT ABD & PELVIS W/O CONTRAST: CPT

## 2017-12-24 PROCEDURE — 82150 ASSAY OF AMYLASE: CPT

## 2017-12-24 PROCEDURE — 2580000003 HC RX 258: Performed by: INTERNAL MEDICINE

## 2017-12-24 PROCEDURE — 99222 1ST HOSP IP/OBS MODERATE 55: CPT | Performed by: SURGERY

## 2017-12-24 PROCEDURE — 83690 ASSAY OF LIPASE: CPT

## 2017-12-24 PROCEDURE — 80053 COMPREHEN METABOLIC PANEL: CPT

## 2017-12-24 PROCEDURE — G8978 MOBILITY CURRENT STATUS: HCPCS

## 2017-12-24 PROCEDURE — 97530 THERAPEUTIC ACTIVITIES: CPT

## 2017-12-24 PROCEDURE — G8979 MOBILITY GOAL STATUS: HCPCS

## 2017-12-24 PROCEDURE — 6370000000 HC RX 637 (ALT 250 FOR IP): Performed by: INTERNAL MEDICINE

## 2017-12-24 PROCEDURE — 97162 PT EVAL MOD COMPLEX 30 MIN: CPT

## 2017-12-24 PROCEDURE — 82947 ASSAY GLUCOSE BLOOD QUANT: CPT

## 2017-12-24 PROCEDURE — 97535 SELF CARE MNGMENT TRAINING: CPT

## 2017-12-24 RX ADMIN — ACETAMINOPHEN 650 MG: 325 TABLET ORAL at 04:36

## 2017-12-24 RX ADMIN — ONDANSETRON 4 MG: 2 INJECTION INTRAMUSCULAR; INTRAVENOUS at 19:31

## 2017-12-24 RX ADMIN — Medication 12000 UNITS: at 12:45

## 2017-12-24 RX ADMIN — SODIUM CHLORIDE: 9 INJECTION, SOLUTION INTRAVENOUS at 16:16

## 2017-12-24 RX ADMIN — FERROUS SULFATE TAB 325 MG (65 MG ELEMENTAL FE) 325 MG: 325 (65 FE) TAB at 07:47

## 2017-12-24 RX ADMIN — Medication 12000 UNITS: at 07:48

## 2017-12-24 RX ADMIN — CLOPIDOGREL BISULFATE 75 MG: 75 TABLET ORAL at 07:47

## 2017-12-24 RX ADMIN — GABAPENTIN 100 MG: 100 CAPSULE ORAL at 07:47

## 2017-12-24 RX ADMIN — LEVOTHYROXINE SODIUM 88 MCG: 88 TABLET ORAL at 07:47

## 2017-12-24 RX ADMIN — MECLIZINE HYDROCHLORIDE 25 MG: 25 TABLET ORAL at 07:47

## 2017-12-24 RX ADMIN — FAMOTIDINE 20 MG: 20 TABLET, FILM COATED ORAL at 07:47

## 2017-12-24 RX ADMIN — AMLODIPINE BESYLATE 10 MG: 10 TABLET ORAL at 07:47

## 2017-12-24 RX ADMIN — CIPROFLOXACIN 400 MG: 2 INJECTION, SOLUTION INTRAVENOUS at 12:40

## 2017-12-24 RX ADMIN — ACETAMINOPHEN 650 MG: 325 TABLET ORAL at 21:42

## 2017-12-24 RX ADMIN — ACETAMINOPHEN 650 MG: 325 TABLET ORAL at 11:18

## 2017-12-24 RX ADMIN — ENOXAPARIN SODIUM 30 MG: 30 INJECTION SUBCUTANEOUS at 07:47

## 2017-12-24 RX ADMIN — SODIUM CHLORIDE: 9 INJECTION, SOLUTION INTRAVENOUS at 02:37

## 2017-12-24 ASSESSMENT — ENCOUNTER SYMPTOMS
BLOOD IN STOOL: 0
DIARRHEA: 0
BACK PAIN: 1
SHORTNESS OF BREATH: 1
ORTHOPNEA: 1
EYE REDNESS: 0
VOMITING: 0
SORE THROAT: 0
ABDOMINAL PAIN: 1
COUGH: 0
NAUSEA: 1

## 2017-12-24 ASSESSMENT — PAIN DESCRIPTION - DESCRIPTORS
DESCRIPTORS: CRAMPING
DESCRIPTORS: CRAMPING

## 2017-12-24 ASSESSMENT — PAIN SCALES - GENERAL
PAINLEVEL_OUTOF10: 2
PAINLEVEL_OUTOF10: 0
PAINLEVEL_OUTOF10: 5
PAINLEVEL_OUTOF10: 10
PAINLEVEL_OUTOF10: 0

## 2017-12-24 ASSESSMENT — PAIN DESCRIPTION - PAIN TYPE
TYPE: ACUTE PAIN
TYPE: ACUTE PAIN

## 2017-12-24 ASSESSMENT — PAIN DESCRIPTION - LOCATION
LOCATION: ABDOMEN
LOCATION: ABDOMEN

## 2017-12-24 ASSESSMENT — PAIN DESCRIPTION - FREQUENCY: FREQUENCY: INTERMITTENT

## 2017-12-24 ASSESSMENT — PAIN DESCRIPTION - ORIENTATION: ORIENTATION: RIGHT

## 2017-12-24 NOTE — CONSULTS
[id number]   Updated: 12/24/17 0725     POC Glucose 94 mg/dL   Comprehensive metabolic panel [[id number]] (Abnormal) Collected: 12/24/17 0528   Updated: 12/24/17 0716     Glucose 97 mg/dL    BUN 49 (H) mg/dL    CREATININE 2.33 (H) mg/dL    Bun/Cre Ratio 21 (H)    Calcium 8.1 (L) mg/dL    Sodium 136 mmol/L    Potassium 4.8 mmol/L    Chloride 97 (L) mmol/L    CO2 28 mmol/L    Anion Gap 11 mmol/L    Alkaline Phosphatase 321 (H) U/L     (H) U/L     (H) U/L    Total Bilirubin 0.42 mg/dL    Total Protein 6.3 (L) g/dL    Alb 3.0 (L) g/dL    Albumin/Globulin Ratio 0.9 (L)           12/24/2017  6:11 AM - Laith Ahn Incoming Lab Results From [facility]     Component Results     Component Value Ref Range & Units Status Collected Lab   WBC 7.7  3.5 - 11.0 k/uL Final 12/24/2017  5:28 AM [facility]   RBC 3.28   4.0 - 5.2 m/uL Final 12/24/2017  5:28 AM [facility]   Hemoglobin 8.7   12.0 - 16.0 g/dL Final 12/24/2017  5:28 AM [facility]   Hematocrit 28.0   36 - 46 % Final 12/24/2017  5:28 AM [facility]   MCV 85.3  80 - 100 fL Final 12/24/2017  5:28 AM [facility]   MCH 26.6  26 - 34 pg Final 12/24/2017  5:28 AM [facility]   MCHC 31.2  31 - 37 g/dL Final 12/24/2017  5:28 AM [facility]   RDW 17.4   12.1 - 15.2 % Final 12/24/2017  5:28 AM [facility]   Platelets 391  326 - 450 k/uL Final 12/24/2017 12/24/2017 10:16 AM - Laith Ahn Incoming Lab Results From [facility]     Component Results     Component Value Ref Range & Units Status Collected Lab   Lipase 21  13 - 60 U/L Final 12/24/2017  7:45 AM [facility]        12/24/2017 10:16 AM - Laith Ahn Incoming Lab Results From [facility]     Component Results     Component Value Ref Range & Units Status Collected Lab   Amylase 28  28 - 100 U/L Final 12/24/2017  7:45 AM [facility]            Assessment:    81 yo WF admitted with nausea, dehydration, confusion

## 2017-12-24 NOTE — PROGRESS NOTES
Occupational Therapy   Occupational Therapy Initial Assessment  Date: 2017   Patient Name: Merry Armstrong  MRN: [id number]     : 1925    Patient Diagnosis(es): There were no encounter diagnoses. has a past medical history of Headache(784.0); History of thyroid cancer; Hyperlipidemia; Hypertension; Macular degeneration; Osteoporosis; TIA (transient ischemic attack); and Type II or unspecified type diabetes mellitus without mention of complication, not stated as uncontrolled. has a past surgical history that includes Hysterectomy; Cholecystectomy; Spine surgery; Thyroidectomy; and Ankle surgery (Left). Restrictions  Restrictions/Precautions  Restrictions/Precautions: General Precautions, Fall Risk    Subjective   General  Chart Reviewed: Yes  Patient assessed for rehabilitation services?: Yes  Pain Assessment  Patient Currently in Pain: Denies  Pain Assessment: FLACC  Pain Level: 0  Pain Type: Acute pain  Pain Location: Abdomen  Pain Descriptors: Cramping  Pain Intervention(s): Medication (see eMar)  Response to Pain Intervention: Asleep with RR greater than 10     Social/Functional History  Social/Functional History  Lives With: Alone  Type of Home: House  Home Layout: One level  Home Access: Stairs to enter with rails  Entrance Stairs - Number of Steps: 1  ADL Assistance: Independent  Homemaking Assistance: Independent  Ambulation Assistance: Independent  Transfer Assistance: Independent  Additional Comments: Patient states independence PTA; she uses walker at home, cane community.         Objective   Vision: Within Functional Limits  Hearing: Exceptions to [facility] (requires increased volume)          Standing Balance  Sit to stand: Contact guard assistance  Stand to sit: Contact guard assistance  Toilet Transfers  Toilet - Technique: Ambulating  Toilet Transfer: Contact guard assistance  ADL  Feeding: Independent  Grooming: Contact guard assistance  UE Bathing: Stand by assistance  LE

## 2017-12-24 NOTE — PROGRESS NOTES
Attempted to reach Dr. Carmita Lu with CT and Lab results. No answer at this time. Will continue to try to contact Dr. Carmita Lu.

## 2017-12-24 NOTE — PROGRESS NOTES
Physical Therapy    Facility/Department: [facility] AT THE [facility] MED SURG  Initial Assessment    NAME: Lillian Gann  : 1925  MRN: [id number]    Date of Service: 2017    Patient Diagnosis(es): There were no encounter diagnoses. has a past medical history of Headache(784.0); History of thyroid cancer; Hyperlipidemia; Hypertension; Macular degeneration; Osteoporosis; TIA (transient ischemic attack); and Type II or unspecified type diabetes mellitus without mention of complication, not stated as uncontrolled. has a past surgical history that includes Hysterectomy; Cholecystectomy; Spine surgery; Thyroidectomy; and Ankle surgery (Left).     Restrictions  Restrictions/Precautions  Restrictions/Precautions: General Precautions, Fall Risk  Vision/Hearing  Vision: Within Functional Limits  Hearing: Within functional limits     Subjective  General  Chart Reviewed: Yes  Patient assessed for rehabilitation services?: Yes  Response To Previous Treatment: Not applicable  Family / Caregiver Present: No  Referring Practitioner: Dr. Loren Subramanian   Referral Date : 17  Diagnosis: dehydration   Subjective  Subjective: pt refused therapy at first but then agreed to stand   Pain Screening  Patient Currently in Pain: Denies  Vital Signs  Patient Currently in Pain: Denies       Orientation  Orientation  Overall Orientation Status: Within Normal Limits    Social/Functional History  Social/Functional History  Lives With: Alone  Type of Home: House  Home Layout: One level  Home Access: Stairs to enter with rails  Entrance Stairs - Number of Steps: 1  Ambulation Assistance: Independent  Transfer Assistance: Independent  Objective          AROM RLE (degrees)  RLE AROM: WFL  AROM LLE (degrees)  LLE AROM : WFL  Strength RLE  Comment: grossly 4-/5   Strength LLE  Comment: grossly 4-/5            Transfers  Sit to Stand: Contact guard assistance  Stand to sit: Contact guard assistance  Ambulation  Ambulation?: Yes  WB Status: unrestricted Ambulation 1  Surface: level tile  Device: Rolling Walker  Assistance: Contact guard assistance  Distance: 5 steps      Balance  Posture: Fair  Sitting - Static: Good  Sitting - Dynamic: Good  Standing - Static: Fair  Standing - Dynamic: Fair        Assessment   Body structures, Functions, Activity limitations: Decreased functional mobility ; Decreased high-level IADLs;Decreased endurance;Decreased strength;Decreased balance  Assessment: Pt is a 80year old female that was hospitalized for dehydration. Pt presents with decreased activity toelrance, general weakness, and reduce dynamic standing balance. Pt will benefit from skilled PT in order to address these deficits. Treatment Diagnosis: general weakness   Prognosis: Good  Decision Making: Medium Complexity  Patient Education: pt educated on her POC   REQUIRES PT FOLLOW UP: Yes  Activity Tolerance  Activity Tolerance: Patient Tolerated treatment well     Discharge Recommendations:  Continue to assess pending progress      Plan   Plan  Times per week: 7x/wk   Plan weeks: 2x/daily except weekends 1x/daily   Current Treatment Recommendations: Strengthening, Transfer Training, Neuromuscular Re-education, Balance Training, Functional Mobility Training, Safety Education & Training, Home Exercise Program, Gait Training, Endurance Training, Patient/Caregiver Education & Training  Safety Devices  Type of devices: Left in chair, Call light within reach, Chair alarm in place    G-Code  PT G-Codes  Functional Limitation: Mobility: Walking and moving around  Mobility: Walking and Moving Around Current Status (): At least 20 percent but less than 40 percent impaired, limited or restricted  Mobility: Walking and Moving Around Goal Status ():  At least 1 percent but less than 20 percent impaired, limited or restricted  OutComes Score                                           AM-PAC Score             Goals  Short term goals  Time Frame for Short term goals: 10 days  Short term goal 1: Pt will be educated on her POC  Short term goal 2: Pt will perform all transfers SBA  Short term goal 3: Pt will ambulate 100 feet with FWW CGA  Short term goal 4: Pt will increase dynamic standing balance to Fair + in order to reduce fall risk        Therapy Time   Individual Concurrent Group Co-treatment   Time In 0842         Time Out 0855         Minutes [address] [facility], PT

## 2017-12-25 ENCOUNTER — HOSPITAL ENCOUNTER (INPATIENT)
Age: 82
LOS: 4 days | Discharge: HOME HEALTH | DRG: 389 | End: 2017-12-29
Attending: FAMILY MEDICINE | Admitting: INTERNAL MEDICINE
Payer: MEDICARE

## 2017-12-25 ENCOUNTER — APPOINTMENT (OUTPATIENT)
Dept: GENERAL RADIOLOGY | Age: 82
DRG: 389 | End: 2017-12-25
Attending: FAMILY MEDICINE
Payer: MEDICARE

## 2017-12-25 PROBLEM — R74.8 ELEVATED LIVER ENZYMES: Status: ACTIVE | Noted: 2017-12-25

## 2017-12-25 LAB
COMPLEMENT C3: 114 MG/DL (ref 90–180)
COMPLEMENT C4: 38 MG/DL (ref 10–40)
CREATININE URINE: 88.1 MG/DL (ref 28–217)
FREE KAPPA/LAMBDA RATIO: 1.42 (ref 0.26–1.65)
GLUCOSE BLD-MCNC: 88 MG/DL (ref 65–105)
GLUCOSE BLD-MCNC: 90 MG/DL (ref 65–105)
HAV IGM SER IA-ACNC: NONREACTIVE
HEPATITIS B CORE IGM ANTIBODY: NONREACTIVE
HEPATITIS B SURFACE ANTIGEN: NONREACTIVE
HEPATITIS C ANTIBODY: NONREACTIVE
KAPPA FREE LIGHT CHAINS QNT: 5.04 MG/DL (ref 0.37–1.94)
LAMBDA FREE LIGHT CHAINS QNT: 3.54 MG/DL (ref 0.57–2.63)
PHOSPHORUS: 4.4 MG/DL (ref 2.6–4.5)
SODIUM,UR: 46 MMOL/L
THYROXINE, FREE: 1.48 NG/DL (ref 0.93–1.7)
TOTAL PROTEIN, URINE: 39 MG/DL
TSH SERPL DL<=0.05 MIU/L-ACNC: 5.23 MIU/L (ref 0.3–5)

## 2017-12-25 PROCEDURE — 99497 ADVNCD CARE PLAN 30 MIN: CPT | Performed by: INTERNAL MEDICINE

## 2017-12-25 PROCEDURE — S0028 INJECTION, FAMOTIDINE, 20 MG: HCPCS | Performed by: FAMILY MEDICINE

## 2017-12-25 PROCEDURE — 2060000000 HC ICU INTERMEDIATE R&B

## 2017-12-25 PROCEDURE — 84165 PROTEIN E-PHORESIS SERUM: CPT

## 2017-12-25 PROCEDURE — 36415 COLL VENOUS BLD VENIPUNCTURE: CPT

## 2017-12-25 PROCEDURE — 99223 1ST HOSP IP/OBS HIGH 75: CPT | Performed by: INTERNAL MEDICINE

## 2017-12-25 PROCEDURE — 2500000003 HC RX 250 WO HCPCS: Performed by: INTERNAL MEDICINE

## 2017-12-25 PROCEDURE — 6370000000 HC RX 637 (ALT 250 FOR IP): Performed by: SURGERY

## 2017-12-25 PROCEDURE — 84439 ASSAY OF FREE THYROXINE: CPT

## 2017-12-25 PROCEDURE — 86038 ANTINUCLEAR ANTIBODIES: CPT

## 2017-12-25 PROCEDURE — 80074 ACUTE HEPATITIS PANEL: CPT

## 2017-12-25 PROCEDURE — 2500000003 HC RX 250 WO HCPCS: Performed by: FAMILY MEDICINE

## 2017-12-25 PROCEDURE — 84100 ASSAY OF PHOSPHORUS: CPT

## 2017-12-25 PROCEDURE — 94762 N-INVAS EAR/PLS OXIMTRY CONT: CPT

## 2017-12-25 PROCEDURE — 86160 COMPLEMENT ANTIGEN: CPT

## 2017-12-25 PROCEDURE — 2580000003 HC RX 258: Performed by: FAMILY MEDICINE

## 2017-12-25 PROCEDURE — 2580000003 HC RX 258: Performed by: INTERNAL MEDICINE

## 2017-12-25 PROCEDURE — 82570 ASSAY OF URINE CREATININE: CPT

## 2017-12-25 PROCEDURE — 6370000000 HC RX 637 (ALT 250 FOR IP): Performed by: NURSE PRACTITIONER

## 2017-12-25 PROCEDURE — 84156 ASSAY OF PROTEIN URINE: CPT

## 2017-12-25 PROCEDURE — 74000 XR ABDOMEN LIMITED (KUB): CPT

## 2017-12-25 PROCEDURE — 83883 ASSAY NEPHELOMETRY NOT SPEC: CPT

## 2017-12-25 PROCEDURE — 84443 ASSAY THYROID STIM HORMONE: CPT

## 2017-12-25 PROCEDURE — 82947 ASSAY GLUCOSE BLOOD QUANT: CPT

## 2017-12-25 PROCEDURE — 84155 ASSAY OF PROTEIN SERUM: CPT

## 2017-12-25 PROCEDURE — 6360000002 HC RX W HCPCS: Performed by: FAMILY MEDICINE

## 2017-12-25 PROCEDURE — 84300 ASSAY OF URINE SODIUM: CPT

## 2017-12-25 RX ORDER — MAGNESIUM SULFATE 1 G/100ML
1 INJECTION INTRAVENOUS PRN
Status: DISCONTINUED | OUTPATIENT
Start: 2017-12-25 | End: 2017-12-29 | Stop reason: HOSPADM

## 2017-12-25 RX ORDER — ONDANSETRON 2 MG/ML
4 INJECTION INTRAMUSCULAR; INTRAVENOUS EVERY 6 HOURS PRN
Status: DISCONTINUED | OUTPATIENT
Start: 2017-12-25 | End: 2017-12-29 | Stop reason: HOSPADM

## 2017-12-25 RX ORDER — SODIUM CHLORIDE 0.9 % (FLUSH) 0.9 %
10 SYRINGE (ML) INJECTION EVERY 12 HOURS SCHEDULED
Status: DISCONTINUED | OUTPATIENT
Start: 2017-12-25 | End: 2017-12-29 | Stop reason: HOSPADM

## 2017-12-25 RX ORDER — 0.9 % SODIUM CHLORIDE 0.9 %
5 VIAL (ML) INJECTION DAILY
Status: DISCONTINUED | OUTPATIENT
Start: 2017-12-25 | End: 2017-12-29 | Stop reason: CLARIF

## 2017-12-25 RX ORDER — BISACODYL 10 MG
10 SUPPOSITORY, RECTAL RECTAL DAILY PRN
Status: DISCONTINUED | OUTPATIENT
Start: 2017-12-25 | End: 2017-12-25

## 2017-12-25 RX ORDER — SODIUM CHLORIDE 0.9 % (FLUSH) 0.9 %
10 SYRINGE (ML) INJECTION PRN
Status: DISCONTINUED | OUTPATIENT
Start: 2017-12-25 | End: 2017-12-29 | Stop reason: HOSPADM

## 2017-12-25 RX ORDER — ACETAMINOPHEN 325 MG/1
650 TABLET ORAL EVERY 4 HOURS PRN
Status: DISCONTINUED | OUTPATIENT
Start: 2017-12-25 | End: 2017-12-29 | Stop reason: HOSPADM

## 2017-12-25 RX ORDER — BISACODYL 10 MG
10 SUPPOSITORY, RECTAL RECTAL ONCE
Status: COMPLETED | OUTPATIENT
Start: 2017-12-25 | End: 2017-12-25

## 2017-12-25 RX ORDER — IPRATROPIUM BROMIDE AND ALBUTEROL SULFATE 2.5; .5 MG/3ML; MG/3ML
1 SOLUTION RESPIRATORY (INHALATION) EVERY 4 HOURS PRN
Status: DISCONTINUED | OUTPATIENT
Start: 2017-12-25 | End: 2017-12-29 | Stop reason: HOSPADM

## 2017-12-25 RX ORDER — LEVOTHYROXINE SODIUM ANHYDROUS 100 UG/5ML
37.5 INJECTION, POWDER, LYOPHILIZED, FOR SOLUTION INTRAVENOUS DAILY
Status: DISCONTINUED | OUTPATIENT
Start: 2017-12-25 | End: 2017-12-29 | Stop reason: CLARIF

## 2017-12-25 RX ORDER — POTASSIUM CHLORIDE 7.45 MG/ML
10 INJECTION INTRAVENOUS PRN
Status: DISCONTINUED | OUTPATIENT
Start: 2017-12-25 | End: 2017-12-29 | Stop reason: HOSPADM

## 2017-12-25 RX ORDER — HYDRALAZINE HYDROCHLORIDE 20 MG/ML
10 INJECTION INTRAMUSCULAR; INTRAVENOUS EVERY 4 HOURS PRN
Status: DISCONTINUED | OUTPATIENT
Start: 2017-12-25 | End: 2017-12-29 | Stop reason: HOSPADM

## 2017-12-25 RX ORDER — SODIUM CHLORIDE 9 MG/ML
INJECTION, SOLUTION INTRAVENOUS CONTINUOUS
Status: DISCONTINUED | OUTPATIENT
Start: 2017-12-25 | End: 2017-12-26

## 2017-12-25 RX ADMIN — ENOXAPARIN SODIUM 30 MG: 30 INJECTION SUBCUTANEOUS at 10:33

## 2017-12-25 RX ADMIN — Medication 5 ML: at 10:32

## 2017-12-25 RX ADMIN — BISACODYL 10 MG: 10 SUPPOSITORY RECTAL at 10:26

## 2017-12-25 RX ADMIN — Medication 10 ML: at 10:33

## 2017-12-25 RX ADMIN — ACETAMINOPHEN 650 MG: 325 TABLET ORAL at 05:08

## 2017-12-25 RX ADMIN — FAMOTIDINE 20 MG: 10 INJECTION, SOLUTION INTRAVENOUS at 10:33

## 2017-12-25 RX ADMIN — LEVOTHYROXINE SODIUM ANHYDROUS 37.5 MCG: 100 INJECTION, POWDER, LYOPHILIZED, FOR SOLUTION INTRAVENOUS at 10:32

## 2017-12-25 RX ADMIN — SODIUM CHLORIDE: 9 INJECTION, SOLUTION INTRAVENOUS at 00:49

## 2017-12-25 ASSESSMENT — PAIN SCALES - GENERAL: PAINLEVEL_OUTOF10: 3

## 2017-12-25 NOTE — CONSULTS
General Surgery Consult:    PATIENT NAME: Arden Charles  AGE: 80 y.o. MEDICAL RECORD NO. [id number]  DATE: 12/25/2017  SURGEON: Dr. Flaquito Arce: Maria G Finn MD    Patient evaluated at the request of  Dr. Jay Lock  Reason for evaluation: concern for SBO    Patient information was obtained from patient. History/Exam limitations: none. IMPRESSION:     Patient Active Problem List   Diagnosis    Hypertension    Type 2 diabetes with nephropathy ([address]    Hyperlipidemia    Left shoulder pain    Senile nuclear sclerosis    Malignant neoplasm of thyroid gland ([address]    Renal failure    Calculus of kidney    Anemia    Transient cerebral ischemia    Asthma    Backache    Debility    Anemia    [facility] obstructive pulmonary disease [facility]    Community acquired pneumonia of left lower lobe of lung ([facility]    Acute on chronic renal failure (HCC)    Acute hyperkalemia    Chronic diastolic heart failure (HCC)    Severe mitral regurgitation by prior echocardiogram    Hypoxia    Acute on chronic combined systolic and diastolic CHF (congestive heart failure) (HCC)    CKD (chronic kidney disease) stage 3, GFR 30-59 ml/min    Dehydration    Small bowel obstruction     1. SBO, fecalization of small bowel, large stool burden in right colon      MEDICAL DECISION MAKING AND PLAN:   1. Recommend suppository now  2. NPO with ice chips  3. Serial abd exams  4. Recommend GI consult due to unknown transaminitis  5. Encouraged ambulation       HISTORY:   History of Chief Complaint: Patient is a 80 y.o. female who was a transfer from [address] [place] [address] due to a small bowel obstruction. Pt originally presented to [facility] and was treated for pneumonia, CHF, and was found to have a right side kidney stone. Pt was transferred to [facility] for further management of her SBO. Pt reports that she originally started having abdominal pain on 12/23/2017. Pt reports that her pain is all across her abdomen.  Pt reports that her last BM was on Friday and she is not sure if she is passing flatus. Pt reports that she has been having some nausea but did vomit once yesterday. Pt reports that she did have a c scope 10 years ago and it was normal. Pt denies any fevers, chills, chest pain, or sob. Pt denies any history of colon cancer. CT scan shows a large stool burden in the right colon, fecalization of the small bowel, bowel distension, and persistent SBO with some thickening of the terminal ileum. Past Medical History   has a past medical history of Headache(784.0); History of thyroid cancer; Hyperlipidemia; Hypertension; Macular degeneration; Osteoporosis; TIA (transient ischemic attack); and Type II or unspecified type diabetes mellitus without mention of complication, not stated as uncontrolled. Past Surgical History   has a past surgical history that includes Hysterectomy; Cholecystectomy; Spine surgery; Thyroidectomy; and Ankle surgery (Left). Medications  Prior to Admission medications    Medication Sig Start Date End Date Taking? Authorizing Provider   HYDROcodone-acetaminophen (NORCO) 5-325 MG per tablet Take 1 tablet by mouth every 4 hours as needed for Pain . 12/23/17 12/26/17  Kayla Mueller, DO   ondansetron (ZOFRAN ODT) 4 MG disintegrating tablet Take 1 tablet by mouth every 4 hours as needed for Nausea or Vomiting 12/23/17   Kayla Mueller, DO   furosemide (LASIX) 20 MG tablet Take 1 tablet by mouth daily 12/19/17   Juana Garcia MD   potassium chloride (KLOR-CON M) 10 MEQ extended release tablet Take 1 tablet by mouth daily 12/19/17   Juana Garcia MD   acetaminophen (TYLENOL) 500 MG tablet Take 1,000 mg by mouth 3 times daily as needed for Pain    Historical Provider, MD   zolpidem (AMBIEN) 5 MG tablet Take 5 mg by mouth nightly as needed for Sleep .     Historical Provider, MD   ipratropium-albuterol (DUONEB) 0.5-2.5 (3) MG/3ML SOLN nebulizer solution Inhale 3 mLs into the lungs 4 times daily 11/27/17   Brenda Youssef PA-C   amLODIPine (NORVASC) 10 MG tablet Take 1 tablet by mouth daily 11/15/17   Celeste Patten MD   hydrochlorothiazide (MICROZIDE) 12.5 MG capsule Take 12.5 mg by mouth daily    Historical Provider, MD   rosuvastatin (CRESTOR) 5 MG tablet TAKE 1 TABLET BY MOUTH ONCE A DAY 11/14/17   Celeste Patten MD   gabapentin (NEURONTIN) 100 MG capsule TAKE 1 CAPSULE BY MOUTH THREE TIMES DAILY 9/19/17   Celeste Patten MD   glimepiride (AMARYL) 1 MG tablet TAKE 1 TABLET BY MOUTH ONCE DAILY IN THE MORNING 7/18/17   Celeste Patten MD   allopurinol (ZYLOPRIM) 300 MG tablet Take 1 tablet by mouth daily 6/12/17 11/13/26  Celeste Patten MD   lactase (LACTAID) 3000 UNITS tablet Take 4 tablets by mouth 3 times daily (with meals)     Historical Provider, MD   clopidogrel (PLAVIX) 75 MG tablet TAKE 1 TABLET BY MOUTH ONCE A DAY 4/28/17   Celeste Patten MD   levothyroxine (SYNTHROID) 88 MCG tablet Take 88 mcg by mouth daily  2/4/17   Historical Provider, MD   ferrous sulfate 325 (65 FE) MG tablet Take 325 mg by mouth daily (with breakfast). Historical Provider, MD   meclizine (ANTIVERT) 25 MG tablet Take 25 mg by mouth daily     Historical Provider, MD    Scheduled Meds:   enoxaparin  30 mg Subcutaneous Daily    famotidine (PEPCID) injection  20 mg Intravenous Daily    sodium chloride flush  10 mL Intravenous 2 times per day     Continuous Infusions:   sodium chloride 75 mL/hr at 12/25/17 0049     PRN Meds:. HYDROmorphone **OR** HYDROmorphone, magnesium sulfate, ondansetron, potassium chloride, sodium chloride flush, ipratropium-albuterol, bisacodyl    Allergies  has No Known Allergies. Family History  family history includes Heart Disease in her father; Stroke in her mother. Social History   reports that she has never smoked. She has never used smokeless tobacco.   reports that she does not drink alcohol. reports that she does not use drugs.     Review of Systems  GEN: Denies fevers,

## 2017-12-25 NOTE — PLAN OF CARE
Melany Pabon 19    Second Visit Note  For more detailed information please refer to the progress note of the day      12/25/2017    3:40 PM    Name:   Tracey Barnard  MRN:     [id number]     Acct:      [de-identified]   Room:   [facility]6123-70   Day:  0  Admit Date:  12/25/2017 12:01 AM    PCP:   Tahmina Yeager MD  Code Status:  DNR-CCA        Pt vitals were reviewed   New labs were reviewed   Patient was seen    Updated plan :     1. Code status was discussed with patient the presence of her family members she in power for  wishes her to be DNR CC arrest, no intubation  2. Feeling thirsty requesting for ice chips  3.  Had very small bowel movement requesting another suppository        Roxanne Lovett MD  12/25/2017  3:40 PM

## 2017-12-25 NOTE — PROGRESS NOTES
Patient seen and examined. Continues to have abdominal pain. Denies N/V. No flatus/BM. Abdomen distended, diffusely TTP. No peritoneal sign. Patient has not been given suppository yet. Will give suppository now. NGT placement with continuous suction for first 2 hours and LIS thereafter. KUB for NGT placement confirmation.     Lisbeth Wiggins DO, PGY-4  Pager#: [phone]  8:21 AM 12/25/2017

## 2017-12-25 NOTE — PROGRESS NOTES
movement, no respiratory distress  HEART: normal rate, normal S1 and S2, no gallops, intact distal pulses and no carotid bruits  ABDOMEN: tender to palpation, but no peritoneal signs  WOUNDS: old post surgical scars  EXTREMITY: no cyanosis and no clubbing    24 HR INTAKE/OUTPUT:   Intake/Output Summary (Last 24 hours) at 12/25/17 1041  Last data filed at 12/25/17 0735   Gross per 24 hour   Intake            216.3 ml   Output              225 ml   Net             -8.7 ml       Chest X-Ray:   COAGS:   Recent Labs      12/22/17 2035 12/24/17   0528   PROT  9.0*  6.3*     PANCREAS:    Recent Labs      12/22/17 2035 12/24/17   0745   LIPASE  21  21   AMYLASE   --   28     LIVER: Recent Labs      12/22/17 2035 12/24/17   0528   AST  24  421*   ALT  14  386*   BILITOT  0.31  0.42   ALKPHOS  116*  321*         Ce Millard MD  12/25/17, 10:41 AM

## 2017-12-25 NOTE — CONSULTS
rhonchi  Cardiovascular: Normal S1 & S2,  No S3 or  S4, no Pericardial Rub no Murmur   Abdomen: distended and tympanitic on percussion. No bowel sounds were audible. Extremities:no  edema  Neuro grossly intact no focal neurological deficit  Labs:    CBC:  Recent Labs      12/22/17 2035 12/24/17   0528   WBC  11.5*  7.7   RBC  4.44  3.28*   HGB  11.9*  8.7*   HCT  37.4  28.0*   MCV  84.2  85.3   MCH  26.8  26.6   MCHC  31.8  31.2   RDW  17.6*  17.4*   PLT  322  217   MPV  9.6  9.7      BMP: Recent Labs      12/22/17 2035 12/24/17   0528   NA  136  136   K  4.5  4.8   CL  93*  97*   CO2  27  28   BUN  46*  49*   CREATININE  2.02*  2.33*   GLUCOSE  184*  97   CALCIUM  9.5  8.1*         Phosphorus:    Recent Labs      12/25/17   0104   PHOS  4.4     Magnesium: No results for input(s): MG in the last 72 hours. Albumin:   Recent Labs      12/22/17 2035 12/24/17   0528   LABALBU  4.2  3.0*    Urine Creatinine:    Lab Results   Component Value Date    LABCREA 47.4 11/13/2017     Urine Eosinophils: No components found for: EOSU  Urine Protein:  No results found for: TPU  Urinalysis:  U/A: Lab Results   Component Value Date    NITRU NEGATIVE 12/23/2017    COLORU YELLOW 12/23/2017    PHUR 5.5 12/23/2017    WBCUA 0 TO 2 12/23/2017    RBCUA 0 TO 2 12/23/2017    MUCUS TRACE 12/23/2017    TRICHOMONAS NOT REPORTED 12/23/2017    YEAST NOT REPORTED 12/23/2017    BACTERIA TRACE 12/23/2017    SPECGRAV 1.025 12/23/2017    LEUKOCYTESUR NEGATIVE 12/23/2017    UROBILINOGEN Normal 12/23/2017    BILIRUBINUR NEGATIVE 12/23/2017    BILIRUBINUR NEGATIVE 10/26/2011    GLUCOSEU NEGATIVE 12/23/2017    GLUCOSEU NEGATIVE 10/26/2011    [address] NEGATIVE 12/23/2017    AMORPHOUS NOT REPORTED 12/23/2017         Radiology:  Reviewed as available. Assessment:  1. Acute kidney injury most likely due to volume depletion and third spacing. Patient is receiving IV fluids and tolerating well.   2.  Small bowel obstruction: Patient is high risk for

## 2017-12-25 NOTE — DISCHARGE SUMMARY
stranding is seen at the terminal ileum. There is a diverticulosis of the sigmoid colon without convincing evidence of acute diverticulitis. Appendix is normal. There are atherosclerotic calcifications throughout the aorta. No aneurysms identified. Degenerative spondylosis is demonstrated throughout the spine. No acute osseous abnormality identified. Impression:  1. Focal inflammatory stranding seen surrounding the distal ilium. Correlate clinically for distal ileitis. 2. Multiple prominent loops of small bowel throughout the abdomen possibly represent ileus secondary to ileitis. Early or partial small bowel obstruction is not completely excluded, however no obvious transition point is identified. 3. Distal right ureteral vesicular junction stone without evidence of acute hydronephrosis of the right kidney. 4. Multiple other bilateral nonobstructive renal stones. Electronically Signed By: Noé Ballesteros   on  12/22/2017  23:33    Xr Chest Standard (2 Vw)    Result Date: 12/15/2017  REPORT: Chest PA and lateral INDICATION: Shortness of breath FINDINGS: Pulmonary vascular congestion is seen throughout both lungs. Trace right and small left pleural effusions. Cardiomegaly. No free intraperitoneal air. Degenerative changes thoracic spine.  Final report electronically signed by Fabiana Blanchard on 12/15/2017 3:46 PM    CHF with effusions    Recent Results (from the past 96 hour(s))   Comprehensive metabolic panel    Collection Time: 12/22/17  8:35 PM   Result Value Ref Range    Glucose 184 (H) 70 - 99 mg/dL    BUN 46 (H) 8 - 23 mg/dL    CREATININE 2.02 (H) 0.50 - 0.90 mg/dL    Bun/Cre Ratio 23 (H) 9 - 20    Calcium 9.5 8.6 - 10.4 mg/dL    Sodium 136 135 - 144 mmol/L    Potassium 4.5 3.7 - 5.3 mmol/L    Chloride 93 (L) 98 - 107 mmol/L    CO2 27 20 - 31 mmol/L    Anion Gap 16 9 - 17 mmol/L    Alkaline Phosphatase 116 (H) 35 - 104 U/L    ALT 14 5 - 33 U/L    AST 24 <32 U/L    Total Bilirubin 0.31 0.3 - 1.2 mg/dL    Total Protein 9.0 (H) 6.4 - 8.3 g/dL    Alb 4.2 3.5 - 5.2 g/dL    Albumin/Globulin Ratio 0.9 (L) 1.0 - 2.5    GFR Non-African American 23 (L) >60 mL/min    GFR  28 (L) >60 mL/min    GFR Comment          GFR Staging         Lipase    Collection Time: 12/22/17  8:35 PM   Result Value Ref Range    Lipase 21 13 - 60 U/L   CBC auto differential    Collection Time: 12/22/17  8:35 PM   Result Value Ref Range    WBC 11.5 (H) 3.5 - 11.0 k/uL    RBC 4.44 4.0 - 5.2 m/uL    Hemoglobin 11.9 (L) 12.0 - 16.0 g/dL    Hematocrit 37.4 36 - 46 %    MCV 84.2 80 - 100 fL    MCH 26.8 26 - 34 pg    MCHC 31.8 31 - 37 g/dL    RDW 17.6 (H) 12.1 - 15.2 %    Platelets 207 991 - 974 k/uL    MPV 9.6 6.0 - 12.0 fL    Differential Type NOT REPORTED     Immature Granulocytes NOT REPORTED 0 %    Absolute Immature Granulocyte NOT REPORTED 0.00 - 0.30 k/uL    WBC Morphology NOT REPORTED     RBC Morphology NOT REPORTED     Platelet Estimate NOT REPORTED     Seg Neutrophils 77 (H) 36 - 66 %    Lymphocytes 16 (L) 24 - 44 %    Monocytes 4 0 - 12 %    Eosinophils % 3 0 - 8 %    Basophils 0 0 - 2 %    Segs Absolute 8.80 (H) 1.8 - 7.7 k/uL    Absolute Lymph # 1.80 1.0 - 4.8 k/uL    Absolute Mono # 0.50 0.0 - 1.0 k/uL    Absolute Eos # 0.30 0.0 - 0.4 k/uL    Basophils # 0.00 0.0 - 0.2 k/uL   TSH without Reflex    Collection Time: 12/22/17  8:35 PM   Result Value Ref Range    TSH 9.00 (H) 0.30 - 5.00 mIU/L   UA W/ Reflex Culture    Collection Time: 12/22/17 10:16 PM   Result Value Ref Range    Color, UA YELLOW YEL    Turbidity UA SLIGHTLY CLOUDY (A) CLEAR    Glucose, Ur NEGATIVE NEG    Bilirubin Urine NEGATIVE NEG    Ketones, Urine NEGATIVE NEG    Specific Gravity, UA 1.025 (H) 1.010 - 1.020    Urine Hgb NEGATIVE NEG    pH, UA 5.0 5.0 - 9.0    Protein, UA 2+ (A) NEG    Urobilinogen, Urine Normal NORM    Nitrite, Urine NEGATIVE NEG    Leukocyte Esterase, Urine NEGATIVE NEG    Urinalysis Comments NOT REPORTED    Microscopic Urinalysis    Collection Time: - 12 %    Eosinophils % 6 0 - 8 %    Basophils 0 0 - 2 %    Segs Absolute 5.80 1.8 - 7.7 k/uL    Absolute Lymph # 1.10 1.0 - 4.8 k/uL    Absolute Mono # 0.30 0.0 - 1.0 k/uL    Absolute Eos # 0.40 0.0 - 0.4 k/uL    Basophils # 0.00 0.0 - 0.2 k/uL   Comprehensive metabolic panel    Collection Time: 12/24/17  5:28 AM   Result Value Ref Range    Glucose 97 70 - 99 mg/dL    BUN 49 (H) 8 - 23 mg/dL    CREATININE 2.33 (H) 0.50 - 0.90 mg/dL    Bun/Cre Ratio 21 (H) 9 - 20    Calcium 8.1 (L) 8.6 - 10.4 mg/dL    Sodium 136 135 - 144 mmol/L    Potassium 4.8 3.7 - 5.3 mmol/L    Chloride 97 (L) 98 - 107 mmol/L    CO2 28 20 - 31 mmol/L    Anion Gap 11 9 - 17 mmol/L    Alkaline Phosphatase 321 (H) 35 - 104 U/L     (H) 5 - 33 U/L     (H) <32 U/L    Total Bilirubin 0.42 0.3 - 1.2 mg/dL    Total Protein 6.3 (L) 6.4 - 8.3 g/dL    Alb 3.0 (L) 3.5 - 5.2 g/dL    Albumin/Globulin Ratio 0.9 (L) 1.0 - 2.5    GFR Non-African American 20 (L) >60 mL/min    GFR  24 (L) >60 mL/min    GFR Comment          GFR Staging         Glucose, Whole Blood    Collection Time: 12/24/17  6:55 AM   Result Value Ref Range    POC Glucose 94 74 - 100 mg/dL   Amylase    Collection Time: 12/24/17  7:45 AM   Result Value Ref Range    Amylase 28 28 - 100 U/L   Lipase    Collection Time: 12/24/17  7:45 AM   Result Value Ref Range    Lipase 21 13 - 60 U/L   Glucose, Whole Blood    Collection Time: 12/24/17 11:33 AM   Result Value Ref Range    POC Glucose 105 (H) 74 - 100 mg/dL   Glucose, Whole Blood    Collection Time: 12/24/17  4:57 PM   Result Value Ref Range    POC Glucose 147 (H) 74 - 100 mg/dL   Glucose, Whole Blood    Collection Time: 12/24/17  8:22 PM   Result Value Ref Range    POC Glucose 117 (H) 74 - 100 mg/dL   Phosphorus    Collection Time: 12/25/17  1:04 AM   Result Value Ref Range    Phosphorus 4.4 2.6 - 4.5 mg/dL     ·     Discharge Condition:   · poor    Disposition:   · [facility]    Discharge Medications:     Lori Berry,

## 2017-12-25 NOTE — PROGRESS NOTES
Spoke with [facility] regarding an ETA for transport, they stated it will be here within 30 mins. Running late due to weather.

## 2017-12-25 NOTE — H&P
mention of complication, not stated as uncontrolled         Past Surgical History:     Past Surgical History:   Procedure Laterality Date    ANKLE SURGERY Left     CHOLECYSTECTOMY      HYSTERECTOMY      SPINE SURGERY      THYROIDECTOMY          Medications Prior to Admission:     Prior to Admission medications    Medication Sig Start Date End Date Taking? Authorizing Provider   HYDROcodone-acetaminophen (NORCO) 5-325 MG per tablet Take 1 tablet by mouth every 4 hours as needed for Pain . 12/23/17 12/26/17  Kayla Mueller, DO   ondansetron (ZOFRAN ODT) 4 MG disintegrating tablet Take 1 tablet by mouth every 4 hours as needed for Nausea or Vomiting 12/23/17   Kayla Mueller, DO   furosemide (LASIX) 20 MG tablet Take 1 tablet by mouth daily 12/19/17   Juana Garcia MD   potassium chloride (KLOR-CON M) 10 MEQ extended release tablet Take 1 tablet by mouth daily 12/19/17   Juana Garcia MD   acetaminophen (TYLENOL) 500 MG tablet Take 1,000 mg by mouth 3 times daily as needed for Pain    Historical Provider, MD   zolpidem (AMBIEN) 5 MG tablet Take 5 mg by mouth nightly as needed for Sleep .     Historical Provider, MD   ipratropium-albuterol (DUONEB) 0.5-2.5 (3) MG/3ML SOLN nebulizer solution Inhale 3 mLs into the lungs 4 times daily 11/27/17   Meenu Youssef PA-C   amLODIPine (NORVASC) 10 MG tablet Take 1 tablet by mouth daily 11/15/17   Juana Garcia MD   hydrochlorothiazide (MICROZIDE) 12.5 MG capsule Take 12.5 mg by mouth daily    Historical Provider, MD   rosuvastatin (CRESTOR) 5 MG tablet TAKE 1 TABLET BY MOUTH ONCE A DAY 11/14/17   Juana Garcia MD   gabapentin (NEURONTIN) 100 MG capsule TAKE 1 CAPSULE BY MOUTH THREE TIMES DAILY 9/19/17   Juana Garcia MD   glimepiride (AMARYL) 1 MG tablet TAKE 1 TABLET BY MOUTH ONCE DAILY IN THE MORNING 7/18/17   Juana Garcia MD   allopurinol (ZYLOPRIM) 300 MG tablet Take 1 tablet by mouth daily 6/12/17 11/13/26  Juana Garcia MD   lactase (LACTAID) 3000 UNITS (!) 139/59   Pulse 68   Temp 97.9 °F (36.6 °C)   Resp 20   Ht 5' 1\" (1.549 m)   Wt 155 lb 3.2 oz (70.4 kg)   SpO2 99%   BMI 29.32 kg/m²   Temp (24hrs), Av.9 °F (36.6 °C), Min:97.5 °F (36.4 °C), Max:98.7 °F (37.1 °C)    Recent Labs      17   0655  17   1133  17   1657  17   POCGLU  94  105*  147*  117*       Intake/Output Summary (Last 24 hours) at 17 1420  Last data filed at 17 8129   Gross per 24 hour   Intake            216.3 ml   Output              225 ml   Net             -8.7 ml       General Appearance:  alert, well appearing, and in no acute distress  Mental status: oriented to person, place, and time with normal affect  Head:  normocephalic, atraumatic. Eye: no icterus, redness, pupils equal and reactive, extraocular eye movements intact, conjunctiva clear  Ear: normal external ear, no discharge, hearing intact  Nose:  no drainage noted  Mouth: mucous membranes moist  Neck: supple, no carotid bruits, thyroid not palpable  Lungs: Bilateral equal air entry, clear to ausculation, no wheezing, +Bilateral basal rales   Cardiovascular: normal rate, regular rhythm, no murmur, gallop, rub.   Abdomen: Soft, nontender, mildly distended, diminished bowel sounds, no hepatomegaly or splenomegaly  Neurologic: There are no new focal motor or sensory deficits, normal muscle tone and bulk, no abnormal sensation, normal speech, cranial nerves II through XII grossly intact  Skin: No gross lesions, rashes, bruising or bleeding on exposed skin area  Extremities:  peripheral pulses palpable, no pedal edema or calf pain with palpation  Psych: normal affect    Investigations:      Laboratory Testing:  Recent Results (from the past 24 hour(s))   Glucose, Whole Blood    Collection Time: 17  4:57 PM   Result Value Ref Range    POC Glucose 147 (H) 74 - 100 mg/dL   Glucose, Whole Blood    Collection Time: 17  8:22 PM   Result Value Ref Range    POC Glucose 117 (H) 74 - 100 mg/dL   Phosphorus    Collection Time: 12/25/17  1:04 AM   Result Value Ref Range    Phosphorus 4.4 2.6 - 4.5 mg/dL   TSH with Reflex    Collection Time: 12/25/17 11:09 AM   Result Value Ref Range    TSH 5.23 (H) 0.30 - 5.00 mIU/L   T4, Free    Collection Time: 12/25/17 11:09 AM   Result Value Ref Range    Thyroxine, Free 1.48 0.93 - 1.70 ng/dL       Imaging/Diagnostics:  CT abdomen without contrast  Impression:     1. Focal inflammatory stranding seen surrounding the distal ilium. Correlate clinically for distal ileitis. 2. Multiple prominent loops of small bowel throughout the abdomen possibly represent ileus secondary to ileitis. Early or partial small bowel obstruction is not completely excluded, however no obvious transition point is identified. 3. Distal right ureteral vesicular junction stone without evidence of acute hydronephrosis of the right kidney. 4. Multiple other bilateral nonobstructive renal stones. CT abdomen pelvis with oral contrast  Impression:             All of the stones previously seen in the kidneys are again felt to be present.        Previously seen stone in the right distal ureter appears to be about the same level as on the prior exam. No advancement has occurred in the last 2 days.                Bowel distention has gotten worse consistent with persistent small-bowel obstruction. There is again noted thickening of the terminal ileum.        Incidental findings as above.        Unexpected finding protocol initiated.         Assessment :      Primary Problem  Small bowel obstruction    Active Hospital Problems    Diagnosis Date Noted    Elevated liver enzymes [R74.8] 12/25/2017     Priority: High    Small bowel obstruction [K56.609] 12/24/2017     Priority: High    Dehydration [E86.0] 12/23/2017     Priority: High    Acute on chronic renal failure (HCC) [N17.9, N18.9] 11/22/2017     Priority: High    Chronic diastolic heart failure ([facility] [address] [I50.32]      Priority:

## 2017-12-25 NOTE — PLAN OF CARE
Problem: Risk for Impaired Skin Integrity  Goal: Tissue integrity - skin and mucous membranes  Structural intactness and normal physiological function of skin and  mucous membranes. Outcome: Ongoing  Skin assessment complete. Pressure relief mattress overlay in place. Patient is able to reposition in bed independently. Education provided on the importance of frequent repositioning to prevent skin breakdown. Toileting offered q2h. Covidien dry pad in place. Kimberly care PRN with incontinence cleanser. Problem: Falls - Risk of  Goal: Absence of falls  Outcome: Ongoing  No falls this shift. Precautions include posted falling star sign, nonskid footwear on, bed locked in lowest position, siderails up x3, table and call light within reach. Bed alarm on. Patient calls out appropriately for assistance. Hourly rounding to assess for needs.

## 2017-12-26 ENCOUNTER — APPOINTMENT (OUTPATIENT)
Dept: GENERAL RADIOLOGY | Age: 82
DRG: 389 | End: 2017-12-26
Attending: FAMILY MEDICINE
Payer: MEDICARE

## 2017-12-26 ENCOUNTER — APPOINTMENT (OUTPATIENT)
Dept: ULTRASOUND IMAGING | Age: 82
DRG: 389 | End: 2017-12-26
Attending: FAMILY MEDICINE
Payer: MEDICARE

## 2017-12-26 LAB
ABSOLUTE EOS #: 0.32 K/UL (ref 0–0.44)
ABSOLUTE IMMATURE GRANULOCYTE: 0.03 K/UL (ref 0–0.3)
ABSOLUTE LYMPH #: 1.05 K/UL (ref 1.1–3.7)
ABSOLUTE MONO #: 0.28 K/UL (ref 0.1–1.2)
ALBUMIN (CALCULATED): 3.4 G/DL (ref 3.2–5.2)
ALBUMIN PERCENT: 54 % (ref 45–65)
ALBUMIN SERPL-MCNC: 3.1 G/DL (ref 3.5–5.2)
ALBUMIN/GLOBULIN RATIO: 0.8 (ref 1–2.5)
ALP BLD-CCNC: 254 U/L (ref 35–104)
ALPHA 1 PERCENT: 4 % (ref 3–6)
ALPHA 2 PERCENT: 14 % (ref 6–13)
ALPHA-1-GLOBULIN: 0.2 G/DL (ref 0.1–0.4)
ALPHA-2-GLOBULIN: 0.9 G/DL (ref 0.5–0.9)
ALT SERPL-CCNC: 181 U/L (ref 5–33)
AMYLASE: 12 U/L (ref 28–100)
ANION GAP SERPL CALCULATED.3IONS-SCNC: 22 MMOL/L (ref 9–17)
ANTI-NUCLEAR ANTIBODY (ANA): NEGATIVE
AST SERPL-CCNC: 75 U/L
BASOPHILS # BLD: 0 % (ref 0–2)
BASOPHILS ABSOLUTE: 0.03 K/UL (ref 0–0.2)
BETA GLOBULIN: 0.8 G/DL (ref 0.5–1.1)
BETA PERCENT: 13 % (ref 11–19)
BILIRUB SERPL-MCNC: 0.27 MG/DL (ref 0.3–1.2)
BILIRUBIN DIRECT: 0.12 MG/DL
BILIRUBIN, INDIRECT: 0.15 MG/DL (ref 0–1)
BUN BLDV-MCNC: 39 MG/DL (ref 8–23)
BUN/CREAT BLD: ABNORMAL (ref 9–20)
CALCIUM IONIZED: 1.17 MMOL/L (ref 1.13–1.33)
CALCIUM SERPL-MCNC: 8.2 MG/DL (ref 8.6–10.4)
CHLORIDE BLD-SCNC: 101 MMOL/L (ref 98–107)
CO2: 18 MMOL/L (ref 20–31)
CREAT SERPL-MCNC: 1.77 MG/DL (ref 0.5–0.9)
DIFFERENTIAL TYPE: ABNORMAL
EOSINOPHILS RELATIVE PERCENT: 4 % (ref 1–4)
GAMMA GLOBULIN %: 15 % (ref 9–20)
GAMMA GLOBULIN: 1 G/DL (ref 0.5–1.5)
GFR AFRICAN AMERICAN: 33 ML/MIN
GFR NON-AFRICAN AMERICAN: 27 ML/MIN
GFR SERPL CREATININE-BSD FRML MDRD: ABNORMAL ML/MIN/{1.73_M2}
GFR SERPL CREATININE-BSD FRML MDRD: ABNORMAL ML/MIN/{1.73_M2}
GLOBULIN: ABNORMAL G/DL (ref 1.5–3.8)
GLUCOSE BLD-MCNC: 121 MG/DL (ref 65–105)
GLUCOSE BLD-MCNC: 156 MG/DL (ref 65–105)
GLUCOSE BLD-MCNC: 160 MG/DL (ref 65–105)
GLUCOSE BLD-MCNC: 59 MG/DL (ref 65–105)
GLUCOSE BLD-MCNC: 69 MG/DL (ref 70–99)
HCT VFR BLD CALC: 33.4 % (ref 36.3–47.1)
HEMOGLOBIN: 9.8 G/DL (ref 11.9–15.1)
IMMATURE GRANULOCYTES: 0 %
INR BLD: 0.9
LIPASE: 12 U/L (ref 13–60)
LYMPHOCYTES # BLD: 13 % (ref 24–43)
MAGNESIUM: 2.3 MG/DL (ref 1.6–2.6)
MCH RBC QN AUTO: 26.6 PG (ref 25.2–33.5)
MCHC RBC AUTO-ENTMCNC: 29.3 G/DL (ref 28.4–34.8)
MCV RBC AUTO: 90.8 FL (ref 82.6–102.9)
MONOCYTES # BLD: 3 % (ref 3–12)
PATHOLOGIST: ABNORMAL
PDW BLD-RTO: 14.7 % (ref 11.8–14.4)
PLATELET # BLD: 253 K/UL (ref 138–453)
PLATELET ESTIMATE: ABNORMAL
PMV BLD AUTO: 11.7 FL (ref 8.1–13.5)
POTASSIUM SERPL-SCNC: 4.2 MMOL/L (ref 3.7–5.3)
PROTEIN ELECTROPHORESIS, SERUM: ABNORMAL
PROTHROMBIN TIME: 9.6 SEC (ref 9.4–12.6)
RBC # BLD: 3.68 M/UL (ref 3.95–5.11)
RBC # BLD: ABNORMAL 10*6/UL
SEG NEUTROPHILS: 80 % (ref 36–65)
SEGMENTED NEUTROPHILS ABSOLUTE COUNT: 6.69 K/UL (ref 1.5–8.1)
SODIUM BLD-SCNC: 141 MMOL/L (ref 135–144)
TOTAL PROT. SUM,%: 100 % (ref 98–102)
TOTAL PROT. SUM: 6.3 G/DL (ref 6.3–8.2)
TOTAL PROTEIN: 6.2 G/DL (ref 6.4–8.3)
TOTAL PROTEIN: 7 G/DL (ref 6.4–8.3)
TRIGL SERPL-MCNC: 189 MG/DL
WBC # BLD: 8.4 K/UL (ref 3.5–11.3)
WBC # BLD: ABNORMAL 10*3/UL

## 2017-12-26 PROCEDURE — 6370000000 HC RX 637 (ALT 250 FOR IP): Performed by: NURSE PRACTITIONER

## 2017-12-26 PROCEDURE — 2580000003 HC RX 258: Performed by: INTERNAL MEDICINE

## 2017-12-26 PROCEDURE — G8978 MOBILITY CURRENT STATUS: HCPCS

## 2017-12-26 PROCEDURE — 74245 FL UPPER GI WITH SMALL BOWEL: CPT

## 2017-12-26 PROCEDURE — 6360000002 HC RX W HCPCS: Performed by: FAMILY MEDICINE

## 2017-12-26 PROCEDURE — 82947 ASSAY GLUCOSE BLOOD QUANT: CPT

## 2017-12-26 PROCEDURE — 74000 XR ABDOMEN LIMITED (KUB): CPT

## 2017-12-26 PROCEDURE — 84478 ASSAY OF TRIGLYCERIDES: CPT

## 2017-12-26 PROCEDURE — 80076 HEPATIC FUNCTION PANEL: CPT

## 2017-12-26 PROCEDURE — 2580000003 HC RX 258: Performed by: FAMILY MEDICINE

## 2017-12-26 PROCEDURE — 83690 ASSAY OF LIPASE: CPT

## 2017-12-26 PROCEDURE — 36415 COLL VENOUS BLD VENIPUNCTURE: CPT

## 2017-12-26 PROCEDURE — 76770 US EXAM ABDO BACK WALL COMP: CPT

## 2017-12-26 PROCEDURE — 85025 COMPLETE CBC W/AUTO DIFF WBC: CPT

## 2017-12-26 PROCEDURE — 2500000003 HC RX 250 WO HCPCS: Performed by: INTERNAL MEDICINE

## 2017-12-26 PROCEDURE — 2500000003 HC RX 250 WO HCPCS: Performed by: FAMILY MEDICINE

## 2017-12-26 PROCEDURE — 94762 N-INVAS EAR/PLS OXIMTRY CONT: CPT

## 2017-12-26 PROCEDURE — 2060000000 HC ICU INTERMEDIATE R&B

## 2017-12-26 PROCEDURE — S0028 INJECTION, FAMOTIDINE, 20 MG: HCPCS | Performed by: FAMILY MEDICINE

## 2017-12-26 PROCEDURE — 97530 THERAPEUTIC ACTIVITIES: CPT

## 2017-12-26 PROCEDURE — 99232 SBSQ HOSP IP/OBS MODERATE 35: CPT | Performed by: INTERNAL MEDICINE

## 2017-12-26 PROCEDURE — 6370000000 HC RX 637 (ALT 250 FOR IP): Performed by: INTERNAL MEDICINE

## 2017-12-26 PROCEDURE — 82150 ASSAY OF AMYLASE: CPT

## 2017-12-26 PROCEDURE — 82330 ASSAY OF CALCIUM: CPT

## 2017-12-26 PROCEDURE — G8979 MOBILITY GOAL STATUS: HCPCS

## 2017-12-26 PROCEDURE — 85610 PROTHROMBIN TIME: CPT

## 2017-12-26 PROCEDURE — 97116 GAIT TRAINING THERAPY: CPT

## 2017-12-26 PROCEDURE — 97162 PT EVAL MOD COMPLEX 30 MIN: CPT

## 2017-12-26 PROCEDURE — 83735 ASSAY OF MAGNESIUM: CPT

## 2017-12-26 PROCEDURE — 6360000002 HC RX W HCPCS: Performed by: INTERNAL MEDICINE

## 2017-12-26 PROCEDURE — 80048 BASIC METABOLIC PNL TOTAL CA: CPT

## 2017-12-26 RX ORDER — SCOLOPAMINE TRANSDERMAL SYSTEM 1 MG/1
1 PATCH, EXTENDED RELEASE TRANSDERMAL
Status: DISCONTINUED | OUTPATIENT
Start: 2017-12-26 | End: 2017-12-29 | Stop reason: HOSPADM

## 2017-12-26 RX ORDER — FUROSEMIDE 10 MG/ML
20 INJECTION INTRAMUSCULAR; INTRAVENOUS ONCE
Status: COMPLETED | OUTPATIENT
Start: 2017-12-26 | End: 2017-12-26

## 2017-12-26 RX ORDER — BISACODYL 10 MG
10 SUPPOSITORY, RECTAL RECTAL ONCE
Status: COMPLETED | OUTPATIENT
Start: 2017-12-26 | End: 2017-12-26

## 2017-12-26 RX ORDER — DEXTROSE AND SODIUM CHLORIDE 5; .9 G/100ML; G/100ML
INJECTION, SOLUTION INTRAVENOUS CONTINUOUS
Status: DISCONTINUED | OUTPATIENT
Start: 2017-12-26 | End: 2017-12-29 | Stop reason: HOSPADM

## 2017-12-26 RX ORDER — DEXTROSE MONOHYDRATE 25 G/50ML
12.5 INJECTION, SOLUTION INTRAVENOUS PRN
Status: DISCONTINUED | OUTPATIENT
Start: 2017-12-26 | End: 2017-12-29 | Stop reason: HOSPADM

## 2017-12-26 RX ORDER — DEXTROSE MONOHYDRATE 50 MG/ML
100 INJECTION, SOLUTION INTRAVENOUS PRN
Status: DISCONTINUED | OUTPATIENT
Start: 2017-12-26 | End: 2017-12-29 | Stop reason: HOSPADM

## 2017-12-26 RX ORDER — NICOTINE POLACRILEX 4 MG
15 LOZENGE BUCCAL PRN
Status: DISCONTINUED | OUTPATIENT
Start: 2017-12-26 | End: 2017-12-29 | Stop reason: HOSPADM

## 2017-12-26 RX ADMIN — DEXTROSE AND SODIUM CHLORIDE: 5; 900 INJECTION, SOLUTION INTRAVENOUS at 10:04

## 2017-12-26 RX ADMIN — BISACODYL 10 MG: 10 SUPPOSITORY RECTAL at 11:03

## 2017-12-26 RX ADMIN — Medication 5 ML: at 08:46

## 2017-12-26 RX ADMIN — ACETAMINOPHEN 650 MG: 325 TABLET ORAL at 15:47

## 2017-12-26 RX ADMIN — INSULIN LISPRO 1 UNITS: 100 INJECTION, SOLUTION INTRAVENOUS; SUBCUTANEOUS at 21:25

## 2017-12-26 RX ADMIN — Medication 10 ML: at 08:47

## 2017-12-26 RX ADMIN — DEXTROSE MONOHYDRATE 12.5 G: 500 INJECTION PARENTERAL at 08:46

## 2017-12-26 RX ADMIN — LEVOTHYROXINE SODIUM ANHYDROUS 37.5 MCG: 100 INJECTION, POWDER, LYOPHILIZED, FOR SOLUTION INTRAVENOUS at 08:46

## 2017-12-26 RX ADMIN — FUROSEMIDE 20 MG: 10 INJECTION, SOLUTION INTRAMUSCULAR; INTRAVENOUS at 18:21

## 2017-12-26 RX ADMIN — CHLORASEPTIC 1 SPRAY: 1.5 LIQUID ORAL at 08:45

## 2017-12-26 RX ADMIN — ACETAMINOPHEN 650 MG: 325 TABLET ORAL at 21:25

## 2017-12-26 RX ADMIN — FAMOTIDINE 20 MG: 10 INJECTION, SOLUTION INTRAVENOUS at 08:47

## 2017-12-26 RX ADMIN — ENOXAPARIN SODIUM 30 MG: 30 INJECTION SUBCUTANEOUS at 08:45

## 2017-12-26 ASSESSMENT — PAIN DESCRIPTION - PAIN TYPE
TYPE: ACUTE PAIN
TYPE: ACUTE PAIN

## 2017-12-26 ASSESSMENT — PAIN SCALES - GENERAL
PAINLEVEL_OUTOF10: 3
PAINLEVEL_OUTOF10: 2
PAINLEVEL_OUTOF10: 1
PAINLEVEL_OUTOF10: 3
PAINLEVEL_OUTOF10: 1

## 2017-12-26 ASSESSMENT — PAIN DESCRIPTION - LOCATION: LOCATION: GENERALIZED

## 2017-12-26 ASSESSMENT — PAIN DESCRIPTION - DESCRIPTORS: DESCRIPTORS: DISCOMFORT

## 2017-12-26 ASSESSMENT — PAIN DESCRIPTION - FREQUENCY: FREQUENCY: INTERMITTENT

## 2017-12-26 ASSESSMENT — PAIN DESCRIPTION - PROGRESSION: CLINICAL_PROGRESSION: GRADUALLY IMPROVING

## 2017-12-26 NOTE — PROGRESS NOTES
Renal Progress Note    Patient :  Cathy Figueroa; 80 y.o. MRN# [id number]  Location:  8746/2733-47  Attending:  Margy Rosa MD  Admit Date:  12/25/2017   Hospital Day: 1      Subjective:     Patient was seen and examined. She was sitting in her bed. She was alert and oriented ×3. There was not much drainage in her nasogastric suction container. Creatinine improved as compared to yesterday. Patient has a good oxygen saturation. Outpatient Medications:     Prescriptions Prior to Admission: HYDROcodone-acetaminophen (NORCO) 5-325 MG per tablet, Take 1 tablet by mouth every 4 hours as needed for Pain .   ondansetron (ZOFRAN ODT) 4 MG disintegrating tablet, Take 1 tablet by mouth every 4 hours as needed for Nausea or Vomiting  furosemide (LASIX) 20 MG tablet, Take 1 tablet by mouth daily  potassium chloride (KLOR-CON M) 10 MEQ extended release tablet, Take 1 tablet by mouth daily  acetaminophen (TYLENOL) 500 MG tablet, Take 1,000 mg by mouth 3 times daily as needed for Pain  zolpidem (AMBIEN) 5 MG tablet, Take 5 mg by mouth nightly as needed for Sleep .  ipratropium-albuterol (DUONEB) 0.5-2.5 (3) MG/3ML SOLN nebulizer solution, Inhale 3 mLs into the lungs 4 times daily  amLODIPine (NORVASC) 10 MG tablet, Take 1 tablet by mouth daily  hydrochlorothiazide (MICROZIDE) 12.5 MG capsule, Take 12.5 mg by mouth daily  rosuvastatin (CRESTOR) 5 MG tablet, TAKE 1 TABLET BY MOUTH ONCE A DAY  gabapentin (NEURONTIN) 100 MG capsule, TAKE 1 CAPSULE BY MOUTH THREE TIMES DAILY  glimepiride (AMARYL) 1 MG tablet, TAKE 1 TABLET BY MOUTH ONCE DAILY IN THE MORNING  allopurinol (ZYLOPRIM) 300 MG tablet, Take 1 tablet by mouth daily  lactase (LACTAID) 3000 UNITS tablet, Take 4 tablets by mouth 3 times daily (with meals)   clopidogrel (PLAVIX) 75 MG tablet, TAKE 1 TABLET BY MOUTH ONCE A DAY  levothyroxine (SYNTHROID) 88 MCG tablet, Take 88 mcg by mouth daily   ferrous sulfate 325 (65 FE) MG tablet, Take 325 mg by mouth daily (with breakfast). meclizine (ANTIVERT) 25 MG tablet, Take 25 mg by mouth daily     Current Medications:     Scheduled Meds:    bisacodyl  10 mg Rectal Once    enoxaparin  30 mg Subcutaneous Daily    famotidine (PEPCID) injection  20 mg Intravenous Daily    sodium chloride flush  10 mL Intravenous 2 times per day    levothyroxine  37.5 mcg Intravenous Daily    And    sodium chloride (PF)  5 mL Intravenous Daily    insulin lispro  0-6 Units Subcutaneous TID WC    insulin lispro  0-3 Units Subcutaneous Nightly     Continuous Infusions:    dextrose      dextrose 5 % and 0.9 % NaCl 75 mL/hr at 17 1004     Input/Output:       I/O last 3 completed shifts: In: 1919.3 [P.O.:95; I.V.:1824.3]  Out: 2250 [Urine:1100; Emesis/NG output:1150]. Patient Vitals for the past 96 hrs (Last 3 readings):   Weight   17 0030 155 lb 3.2 oz (70.4 kg)       Vital Signs:   Temperature:  Temp: 97.7 °F (36.5 °C)  TMax:   Temp (24hrs), Av.8 °F (36.6 °C), Min:97.7 °F (36.5 °C), Max:97.9 °F (36.6 °C)    Respirations:  Resp: 18  Pulse:   Pulse: 80  BP:    BP: (!) 145/48  BP Range: Systolic (37VCU), XSX:731 , Min:120 , YTM:368       Diastolic (82AIZ), ZZF:15, Min:48, Max:99      Physical Examination:     General:  Sitting on her bed. She was alert and oriented ×3. HEENT: Atraumatic and normocephalic   Eyes:   Pupils reactive to light  Neck:   No JVD, no thyromegaly, no lymphadenopathy. Chest:   Bilateral air entry no crackles or wheezes  Cardiac:  S1 S2 RR, no murmurs, gallops or rubs, JVP not raised. Abdomen: Abdomen was distended but less tympanitic as compared to yesterday   :   No suprapubic or flank tenderness. Neuro:  AAO x 3, No FND. SKIN:  No rashes, good skin turgor. Extremities:  No edema, palpable peripheral pulses, no calf tenderness.     Labs:       Recent Labs      12/24/17   0528  17   0632   WBC  7.7  8.4   RBC  3.28*  3.68*   HGB  8.7*  9.8*   HCT  28.0*  33.4*   MCV  85.3  90.8   MCH  26.6 Urine Creatinine:     Lab Results   Component Value Date    LABCREA 88.1 12/25/2017     Radiology:     CXR:     Assessment: 1. Acute kidney injury: Most likely cause prerenal azotemia secondary to third spacing and dehydration. Her creatinine is better as compared to yesterday and very close to her baseline  2. Small bowel obstruction: Clinically patient is slightly better as compared to yesterday  3. Chronic kidney disease stage III with a baseline creatinine of 1.5 mg/dL  4. Questionable stone on CT scan and waiting for ultrasound results   Plan:  1.  no changes in medication  2. Continue IV fluid and 75 mL per hour  3. BMP and CBC in a.m. This note is created with the assistance of a speech-recognition program. While intending to generate a document that actually reflects the content of the visit, no guarantees can be provided that every mistake has been identified and corrected by editing  Nutrition   Please ensure that patient is on a renal diet/TF. Avoid nephrotoxic drugs/contrast exposure. This note is created with the assistance of a speech-recognition program. While intending to generate a document that actually reflects the content of the visit, no guarantees can be provided that every mistake has been identified and corrected by editing  Electronically signed by Yimi Alvarenga MD on 12/26/2017 at 12:29 PM      We will continue to follow along with you.      Oanh Casas MD

## 2017-12-26 NOTE — PLAN OF CARE
Problem: Falls - Risk of  Goal: Absence of falls  Outcome: Met This Shift  Patient remains free of falls/injury this shift. Patient remains independent with standby assist with the following fall precautions in place: call light in reach, bed in lowest position, bed wheels locked, bed alarm on, and non-skid footwear when out of bed.

## 2017-12-26 NOTE — PROGRESS NOTES
Contact guard assistance  Quality of Gait: verbal cues for directions as pt is legally blind in a new environment, decreased endurance, antalgic gait pattern  Distance: 60ft  Stairs/Curb  Stairs?: No     Balance  Posture: Fair  Sitting - Static: Good  Sitting - Dynamic: Good  Standing - Static: Fair;+  Standing - Dynamic: Fair;+  Comments: standing balance assessed with RW        Assessment   Body structures, Functions, Activity limitations: Decreased functional mobility ; Decreased strength;Decreased balance;Decreased endurance  Assessment: Pt is most limited by her impaired endurance this date. Pt able to ambulate 60ft CGA with RW. Pt does require min A with bed mobility this date but otherwise requires only CGA for mobility. Pt with very supportive family who is able to assist 24/7 which is what would be required at this time. Pt would benefit from continued therapy at discharge to address above noted impairments. Prognosis: Good  Decision Making: Medium Complexity  Patient Education: Educated on importance of mobility, ambulation and up to chair activities  Barriers to Learning: None  REQUIRES PT FOLLOW UP: Yes  Activity Tolerance  Activity Tolerance: Patient limited by fatigue  PT Equipment Recommendations  Equipment Needed: No  Other: owns rollator         Plan   Plan  Times per week: 5-6x  Times per day: Daily  Current Treatment Recommendations: Strengthening, ROM, Balance Training, Functional Mobility Training, Endurance Training, Transfer Training, Gait Training, Stair training, Home Exercise Program, Safety Education & Training, Patient/Caregiver Education & Training  Safety Devices  Type of devices: Call light within reach, Gait belt, Left in bed, Nurse notified  Restraints  Initially in place: No    G-Code  PT G-Codes  Functional Assessment Tool Used: [place] Tool  Score: 16/28  Functional Limitation: Mobility: Walking and moving around  Mobility: Walking and Moving Around Current Status ():  At least 40 percent but less than 60 percent impaired, limited or restricted  Mobility: Walking and Moving Around Goal Status ():  At least 1 percent but less than 20 percent impaired, limited or restricted    Goals  Short term goals  Time Frame for Short term goals: 14 visits  Short term goal 1: Pt to ambulate 300ft modified independent with RW  Short term goal 2: Pt to sit to stand transfer independently  Short term goal 3: Pt to demonstrate independent bed mobility  Short term goal 4: Pt to tolerate 30 minutes of therapy  Short term goal 5: Pt to ascend/descend one step independently  Patient Goals   Patient goals : Pt would like to feel better and get home       Therapy Time   Individual Concurrent Group Co-treatment   Time In 1525         Time Out 1607         Minutes 42         Timed Code Treatment Minutes: 95 [facility], PT

## 2017-12-26 NOTE — PROGRESS NOTES
Smoking Cessation - topics covered   []  Health Risks  []  Benefits of Quitting   []  Smoking Cessation  [x]  Patient has no history of tobacco use  []  Patient is former smoker. [x]  No need for tobacco cessation education. []  Booklet given  []  Patient verbalizes understanding. []  Patient denies need for tobacco cessation education.   Sary Hooper  8:29 AM

## 2017-12-27 ENCOUNTER — APPOINTMENT (OUTPATIENT)
Dept: GENERAL RADIOLOGY | Age: 82
DRG: 389 | End: 2017-12-27
Attending: FAMILY MEDICINE
Payer: MEDICARE

## 2017-12-27 LAB
ANION GAP SERPL CALCULATED.3IONS-SCNC: 14 MMOL/L (ref 9–17)
BUN BLDV-MCNC: 28 MG/DL (ref 8–23)
BUN/CREAT BLD: ABNORMAL (ref 9–20)
CALCIUM SERPL-MCNC: 8 MG/DL (ref 8.6–10.4)
CHLORIDE BLD-SCNC: 106 MMOL/L (ref 98–107)
CO2: 24 MMOL/L (ref 20–31)
CREAT SERPL-MCNC: 1.51 MG/DL (ref 0.5–0.9)
GFR AFRICAN AMERICAN: 39 ML/MIN
GFR NON-AFRICAN AMERICAN: 32 ML/MIN
GFR SERPL CREATININE-BSD FRML MDRD: ABNORMAL ML/MIN/{1.73_M2}
GFR SERPL CREATININE-BSD FRML MDRD: ABNORMAL ML/MIN/{1.73_M2}
GLUCOSE BLD-MCNC: 133 MG/DL (ref 65–105)
GLUCOSE BLD-MCNC: 140 MG/DL (ref 70–99)
GLUCOSE BLD-MCNC: 147 MG/DL (ref 65–105)
GLUCOSE BLD-MCNC: 148 MG/DL (ref 65–105)
GLUCOSE BLD-MCNC: 156 MG/DL (ref 65–105)
HCT VFR BLD CALC: 30.9 % (ref 36.3–47.1)
HEMOGLOBIN: 9.4 G/DL (ref 11.9–15.1)
MCH RBC QN AUTO: 26.9 PG (ref 25.2–33.5)
MCHC RBC AUTO-ENTMCNC: 30.4 G/DL (ref 28.4–34.8)
MCV RBC AUTO: 88.3 FL (ref 82.6–102.9)
PDW BLD-RTO: 15.1 % (ref 11.8–14.4)
PLATELET # BLD: 252 K/UL (ref 138–453)
PMV BLD AUTO: 12 FL (ref 8.1–13.5)
POTASSIUM SERPL-SCNC: 3.8 MMOL/L (ref 3.7–5.3)
RBC # BLD: 3.5 M/UL (ref 3.95–5.11)
SODIUM BLD-SCNC: 144 MMOL/L (ref 135–144)
WBC # BLD: 6.8 K/UL (ref 3.5–11.3)

## 2017-12-27 PROCEDURE — 99232 SBSQ HOSP IP/OBS MODERATE 35: CPT | Performed by: INTERNAL MEDICINE

## 2017-12-27 PROCEDURE — 85027 COMPLETE CBC AUTOMATED: CPT

## 2017-12-27 PROCEDURE — 2500000003 HC RX 250 WO HCPCS: Performed by: INTERNAL MEDICINE

## 2017-12-27 PROCEDURE — 74000 XR ABDOMEN LIMITED (KUB): CPT

## 2017-12-27 PROCEDURE — 97535 SELF CARE MNGMENT TRAINING: CPT

## 2017-12-27 PROCEDURE — S0028 INJECTION, FAMOTIDINE, 20 MG: HCPCS | Performed by: FAMILY MEDICINE

## 2017-12-27 PROCEDURE — 2060000000 HC ICU INTERMEDIATE R&B

## 2017-12-27 PROCEDURE — 97110 THERAPEUTIC EXERCISES: CPT

## 2017-12-27 PROCEDURE — 80048 BASIC METABOLIC PNL TOTAL CA: CPT

## 2017-12-27 PROCEDURE — G8987 SELF CARE CURRENT STATUS: HCPCS

## 2017-12-27 PROCEDURE — 2580000003 HC RX 258: Performed by: INTERNAL MEDICINE

## 2017-12-27 PROCEDURE — 2500000003 HC RX 250 WO HCPCS: Performed by: FAMILY MEDICINE

## 2017-12-27 PROCEDURE — 94762 N-INVAS EAR/PLS OXIMTRY CONT: CPT

## 2017-12-27 PROCEDURE — 82947 ASSAY GLUCOSE BLOOD QUANT: CPT

## 2017-12-27 PROCEDURE — 6360000002 HC RX W HCPCS: Performed by: INTERNAL MEDICINE

## 2017-12-27 PROCEDURE — 36415 COLL VENOUS BLD VENIPUNCTURE: CPT

## 2017-12-27 PROCEDURE — 97166 OT EVAL MOD COMPLEX 45 MIN: CPT

## 2017-12-27 PROCEDURE — 6360000002 HC RX W HCPCS: Performed by: FAMILY MEDICINE

## 2017-12-27 PROCEDURE — 97116 GAIT TRAINING THERAPY: CPT

## 2017-12-27 PROCEDURE — G8988 SELF CARE GOAL STATUS: HCPCS

## 2017-12-27 RX ORDER — FUROSEMIDE 10 MG/ML
20 INJECTION INTRAMUSCULAR; INTRAVENOUS ONCE
Status: COMPLETED | OUTPATIENT
Start: 2017-12-27 | End: 2017-12-27

## 2017-12-27 RX ADMIN — CHLORASEPTIC 1 SPRAY: 1.5 LIQUID ORAL at 15:42

## 2017-12-27 RX ADMIN — FUROSEMIDE 20 MG: 10 INJECTION, SOLUTION INTRAMUSCULAR; INTRAVENOUS at 12:29

## 2017-12-27 RX ADMIN — LEVOTHYROXINE SODIUM ANHYDROUS 37.5 MCG: 100 INJECTION, POWDER, LYOPHILIZED, FOR SOLUTION INTRAVENOUS at 10:18

## 2017-12-27 RX ADMIN — FAMOTIDINE 20 MG: 10 INJECTION, SOLUTION INTRAVENOUS at 10:17

## 2017-12-27 RX ADMIN — ENOXAPARIN SODIUM 30 MG: 30 INJECTION SUBCUTANEOUS at 10:17

## 2017-12-27 RX ADMIN — CHLORASEPTIC 1 SPRAY: 1.5 LIQUID ORAL at 20:03

## 2017-12-27 RX ADMIN — Medication 5 ML: at 10:18

## 2017-12-27 ASSESSMENT — PAIN DESCRIPTION - DESCRIPTORS: DESCRIPTORS: DISCOMFORT

## 2017-12-27 ASSESSMENT — PAIN DESCRIPTION - PAIN TYPE: TYPE: ACUTE PAIN

## 2017-12-27 ASSESSMENT — PAIN SCALES - GENERAL: PAINLEVEL_OUTOF10: 1

## 2017-12-27 ASSESSMENT — PAIN DESCRIPTION - LOCATION: LOCATION: ABDOMEN

## 2017-12-27 NOTE — PLAN OF CARE
Melany Pabon 19    Second Visit Note  For more detailed information please refer to the progress note of the day      12/26/2017    7:12 PM    Name:   Kelli Burciaga  MRN:     [id number]     Kel:      [de-identified]   Room:   [address] [place] [address] Day:  1  Admit Date:  12/25/2017 12:01 AM    PCP:   Allegra Nicole MD  Code Status:  DNR-CCA        Pt vitals were reviewed   New labs were reviewed   Patient was seen    Updated plan :     1. No bowel movement or flatus yet  2. SBFT still in progress  3.  Daughter requesting meclizine since without it she gets very restless, will order scopolamine patch since she is nothing by mouth        Bryan Segura MD  12/26/2017  7:12 PM

## 2017-12-27 NOTE — PROGRESS NOTES
pleasant/cooperative throughout.   Pain Assessment  Patient Currently in Pain: Yes  Pain Assessment: 0-10  Pain Level: 1  Pain Type: Acute pain  Pain Location: Abdomen  Pain Intervention(s): Repositioned, Ambulation/Increased activity  Response to Pain Intervention: Patient Satisfied    Oxygen Therapy  SpO2: 98 %  Pulse Oximeter Device Mode: Continuous  Pulse Oximeter Device Location: Finger  O2 Device: None (Room air)     Social/Functional History  Social/Functional History  Lives With: Alone  Type of Home: Apartment (Pt states senior living apartment)  Home Layout: One level  Home Access: Ramped entrance  Entrance Stairs - Number of Steps: 1  Entrance Stairs - Rails: Left  Bathroom Shower/Tub: Walk-in shower, Tub/Shower unit (Pt states use of walk-in shower)  Bathroom Toilet: Standard  Bathroom Equipment: Grab bars in shower, Shower chair, Grab bars around toilet (Pt states not using shower chair as it \"gets in her way\")  Bathroom Accessibility: Accessible  Home Equipment: 4 wheeled walker, Cane (Pt reports pull cords in bathroom and bedroom in case of emergency)  Receives Help From: Family (Pt reports supportive children)  ADL Assistance: Independent  Homemaking Assistance: Independent  Homemaking Responsibilities: Yes  Meal Prep Responsibility: Secondary (Pt reports only performing simple meal prep, pt states meals on wheels delivers 5x meals a week)  Laundry Responsibility: Primary  Cleaning Responsibility: Primary  Shopping Responsibility: Primary (Pt's dtr takes her to the store)  Health Care Management: Primary  Ambulation Assistance: Independent (pt reports use of cane when out in the community and use of 4WW within the house)  Transfer Assistance: Independent  Active : No  Patient's  Info: Pt reports family members drive for errands/appointments; use of [employer] for transportation to/from [facility]  Mode of Transportation: Family  Occupation: Retired  Leisure & Hobbies: Likes to go to parties and be social. Enjoys going to [employer]. IADL Comments: Pt has supportive family that is coming in from out of town and plans on staying with her for a week. Pt also has other supportive family members who have offerred to assist if needed. Objective   Vision: Impaired  Vision Exceptions: Legally blind (Pt reports able to see shapes/shadows and colors)  Hearing: Within functional limits    Orientation  Overall Orientation Status: Within Functional Limits     Balance  Sitting Balance: Stand by assistance  Standing Balance: Contact guard assistance  Standing Balance  Time: ~8 minutes  Activity: functional mobility to/from bathroom, toilet transfer, standing sink side for hand hygiene  Comment: CGA with use of RW and min VCs secondary to pt's visual deficit in an unfamiliar environment. Pt with complaint of SOB and required 2x standing rest breaks throughout. Pt educated to incorporate pursed lip breathing techniques and demo good return of these techniques.     Toilet Transfers  Toilet - Technique: Stand step (with use of RW)  Equipment Used: Standard toilet  Toilet Transfer: Contact guard assistance    ADL  Feeding: Setup  Grooming: Stand by assistance;Verbal cueing (to perform hand hygiene while standing sinkside)  UE Bathing: Contact guard assistance;Verbal cueing  LE Bathing: Minimal assistance;Verbal cueing  UE Dressing: Contact guard assistance;Verbal cueing (to doff/don hospital gown while seated EOB)  LE Dressing: Minimal assistance;Verbal cueing (to doff/don brief while seated on toilet)  Toileting: Contact guard assistance (for pericare/bottom hygiene and toilet transfer)  Additional Comments: Pt required setup and VCs for all ADL tasks secondary to visual deficits and unfamiliar environment    RUE Tone: Normotonic  LUE Tone: Normotonic  Movements Are Fluid And Coordinated: Yes     Bed mobility  Supine to Sit: Minimal assistance (with raised HOB)  Sit to Supine: Minimal assistance (for B LE assist only)

## 2017-12-27 NOTE — PROGRESS NOTES
Renal Progress Note    Patient :  Pop Pineda; 80 y.o. MRN# [id number]  Location:  8396/6994-43  Attending:  Deng Porras MD  Admit Date:  2017   Hospital Day: 2      Subjective:     Patient was seen and examined. Patient is feeling better. Alert and awake. Now she is passing flatus. Good urine output and creatinine improve and down to 1.5 mg/dL. Had a small bowel follow-through. Contrast was not preaching to the colon. Suggestive of persistent small bowel obstruction    Outpatient Medications:     Prescriptions Prior to Admission: [] HYDROcodone-acetaminophen (NORCO) 5-325 MG per tablet, Take 1 tablet by mouth every 4 hours as needed for Pain .   ondansetron (ZOFRAN ODT) 4 MG disintegrating tablet, Take 1 tablet by mouth every 4 hours as needed for Nausea or Vomiting  furosemide (LASIX) 20 MG tablet, Take 1 tablet by mouth daily  potassium chloride (KLOR-CON M) 10 MEQ extended release tablet, Take 1 tablet by mouth daily  acetaminophen (TYLENOL) 500 MG tablet, Take 1,000 mg by mouth 3 times daily as needed for Pain  zolpidem (AMBIEN) 5 MG tablet, Take 5 mg by mouth nightly as needed for Sleep .  ipratropium-albuterol (DUONEB) 0.5-2.5 (3) MG/3ML SOLN nebulizer solution, Inhale 3 mLs into the lungs 4 times daily  amLODIPine (NORVASC) 10 MG tablet, Take 1 tablet by mouth daily  hydrochlorothiazide (MICROZIDE) 12.5 MG capsule, Take 12.5 mg by mouth daily  rosuvastatin (CRESTOR) 5 MG tablet, TAKE 1 TABLET BY MOUTH ONCE A DAY  gabapentin (NEURONTIN) 100 MG capsule, TAKE 1 CAPSULE BY MOUTH THREE TIMES DAILY  glimepiride (AMARYL) 1 MG tablet, TAKE 1 TABLET BY MOUTH ONCE DAILY IN THE MORNING  allopurinol (ZYLOPRIM) 300 MG tablet, Take 1 tablet by mouth daily  lactase (LACTAID) 3000 UNITS tablet, Take 4 tablets by mouth 3 times daily (with meals)   clopidogrel (PLAVIX) 75 MG tablet, TAKE 1 TABLET BY MOUTH ONCE A DAY  levothyroxine (SYNTHROID) 88 MCG tablet, Take 88 mcg by mouth daily   ferrous sulfate 325 (65 FE) MG tablet, Take 325 mg by mouth daily (with breakfast). meclizine (ANTIVERT) 25 MG tablet, Take 25 mg by mouth daily     Current Medications:     Scheduled Meds:    scopolamine  1 patch Transdermal Q72H    enoxaparin  30 mg Subcutaneous Daily    famotidine (PEPCID) injection  20 mg Intravenous Daily    sodium chloride flush  10 mL Intravenous 2 times per day    levothyroxine  37.5 mcg Intravenous Daily    And    sodium chloride (PF)  5 mL Intravenous Daily    insulin lispro  0-6 Units Subcutaneous TID WC    insulin lispro  0-3 Units Subcutaneous Nightly     Continuous Infusions:    dextrose      dextrose 5 % and 0.9 % NaCl 75 mL/hr at 17 1004     Input/Output:       I/O last 3 completed shifts: In: 5237 [I.V.:1455]  Out: 900 [Urine:800; Emesis/NG output:100]. Patient Vitals for the past 96 hrs (Last 3 readings):   Weight   17 0030 155 lb 3.2 oz (70.4 kg)       Vital Signs:   Temperature:  Temp: 99.1 °F (37.3 °C)  TMax:   Temp (24hrs), Av.2 °F (36.8 °C), Min:97.8 °F (36.6 °C), Max:99.1 °F (37.3 °C)    Respirations:  Resp: 18  Pulse:   Pulse: 72  BP:    BP: (!) 154/67  BP Range: Systolic (49VSW), SGF:953 , Min:117 , DLL:127       Diastolic (73HJJ), AKF:98, Min:50, Max:67      Physical Examination:     General:  Comfortable in bed alert and oriented ×3. HEENT: Atraumatic and normocephalic   Eyes:   Pupils reactive to light  Neck:   No JVD or thyromegaly  Chest:   Bilateral air entry no crackles or wheezes  Cardiac:  S1 S2 RR, no murmurs, gallops or rubs, JVP not raised. Abdomen: Abdomen is still slightly distended :   No suprapubic or flank tenderness. Neuro:  AAO x 3, No FND. SKIN:  No rashes, good skin turgor. Extremities:  No edema, palpable peripheral pulses, no calf tenderness.     Labs:       Recent Labs      17   0632  17   0604   WBC  8.4  6.8   RBC  3.68*  3.50*   HGB  9.8*  9.4*   HCT  33.4*  30.9*   MCV  90.8  88.3   MCH  26.6  26.9 MCHC  29.3  30.4   RDW  14.7*  15.1*   PLT  253  252   MPV  11.7  12.0      BMP:   Recent Labs      12/26/17   0632  12/27/17   0604   NA  141  144   K  4.2  3.8   CL  101  106   CO2  18*  24   BUN  39*  28*   CREATININE  1.77*  1.51*   GLUCOSE  69*  140*   CALCIUM  8.2*  8.0*      Phosphorus:     Recent Labs      12/25/17   0104   PHOS  4.4     Magnesium:    Recent Labs      12/26/17   0632   MG  2.3     Albumin:    Recent Labs      12/26/17   0632   LABALBU  3.1*     SPEP:  Lab Results   Component Value Date    PROT 7.0 12/26/2017    ALBCAL 3.4 12/25/2017    ALBPCT 54 12/25/2017    LABALPH 0.2 12/25/2017    LABALPH 0.9 12/25/2017    A1PCT 4 12/25/2017    A2PCT 14 12/25/2017    LABBETA 0.8 12/25/2017    BETAPCT 13 12/25/2017    GAMGLOB 1.0 12/25/2017    GGPCT 15 12/25/2017    PATH ELECTRONICALLY SIGNED.  Ania Otoole M.D. 12/25/2017     UPEP:   No results found for: LABPE  C3:     Lab Results   Component Value Date    C3 114 12/25/2017     C4:     Lab Results   Component Value Date    C4 38 12/25/2017   Hep BsAg:         Lab Results   Component Value Date    HEPBSAG NONREACTIVE 12/25/2017     Hep C AB:          Lab Results   Component Value Date    HEPCAB NONREACTIVE 12/25/2017       Urinalysis/Chemistries:      Lab Results   Component Value Date    NITRU NEGATIVE 12/23/2017    COLORU YELLOW 12/23/2017    PHUR 5.5 12/23/2017    WBCUA 0 TO 2 12/23/2017    RBCUA 0 TO 2 12/23/2017    MUCUS TRACE 12/23/2017    TRICHOMONAS NOT REPORTED 12/23/2017    YEAST NOT REPORTED 12/23/2017    BACTERIA TRACE 12/23/2017    SPECGRAV 1.025 12/23/2017    LEUKOCYTESUR NEGATIVE 12/23/2017    UROBILINOGEN Normal 12/23/2017    BILIRUBINUR NEGATIVE 12/23/2017    BILIRUBINUR NEGATIVE 10/26/2011    GLUCOSEU NEGATIVE 12/23/2017    GLUCOSEU NEGATIVE 10/26/2011    KETUA NEGATIVE 12/23/2017    AMORPHOUS NOT REPORTED 12/23/2017     Urine Sodium:     Lab Results   Component Value Date    VASU 46 12/25/2017   Urine Creatinine:     Lab Results Component Value Date    LABCREA 88.1 12/25/2017     Radiology:     Renal ultrasound nonobstructive calculi are present. There was no ureteric dilatation or hydronephrosis    Assessment: 1. Acute kidney injury: Due to volume depletion and creatinine is better   2. Small bowel obstruction: Clinically slightly better however his small bowel follow-through is still shows persistent since small bowel obstruction  3. Hypertension under good control  4. Questionable stone on CT scan and waiting for ultrasound results   Plan:  1.  no changes in medication  2. Decrease IV fluid to 50 mL per hour  3. BMP and CBC in a.m. Will follow with you  Nutrition   Please ensure that patient is on a renal diet/TF. Avoid nephrotoxic drugs/contrast exposure.   This note is created with the assistance of a speech-recognition program. While intending to generate a document that actually reflects the content of the visit, no guarantees can be provided that every mistake has been identified and corrected by editing  Electronically signed by Henrry Ledbetter MD on 12/27/2017 at 3:50 PM

## 2017-12-27 NOTE — PROGRESS NOTES
Nutrition Assessment    Type and Reason for Visit: Reassess    Nutrition Recommendations: Continue NPO. Start nutrition as able. Monitor for plan of care. Malnutrition Assessment:  · Malnutrition Status: At risk for malnutrition    Nutrition Diagnosis:   · Problem: Inadequate oral intake  · Etiology: related to Alteration in GI function     Signs and symptoms:  as evidenced by NPO status due to medical condition    Nutrition Assessment:  · Subjective Assessment: Pt remains NPO with only ice chips and sips allowed. NG in place with 100 ml output. Pt with no BM or flatus. Noted pt with some mucous-like output from rectum last night. Plans for surgical intervention tomorrow. · Nutrition-Focused Physical Findings: +hypoactive bowel sounds. · Wound Type: None  · Current Nutrition Therapies:  · Oral Diet Orders: NPO   · Oral Diet intake: NPO  · Anthropometric Measures:  · Ht: 5' 1\" (154.9 cm)   · Current Body Wt: 155 lb 3.2 oz (70.4 kg)  · Admission Body Wt: 155 lb 3.3 oz (70.4 kg)  · Ideal Body Wt: 105 lb (47.6 kg), % Ideal Body 148%  · BMI Classification: BMI 25.0 - 29.9 Overweight  · Comparative Standards (Estimated Nutrition Needs):  · Estimated Daily Total Kcal: 3621-5744 kcal per day   · Estimated Daily Protein (g): 55-70 g pro per day     Estimated Intake vs Estimated Needs: Intake Less Than Needs    Nutrition Risk Level: High    Nutrition Interventions:   Continue NPO  Continued Inpatient Monitoring, Education Not Indicated    Nutrition Evaluation:   · Evaluation: No progress toward goals   · Goals: meet % of estimated nutrition needs with oral diet/nutrition support     · Monitoring: NPO Status, Skin Integrity, Fluid Balance, Weight, Pertinent Labs, Constipation    See Adult Nutrition Doc Flowsheet for more detail.      Electronically signed by Mehreen Hobbs RD, LD on 12/27/17 at 3:54 PM    Contact Number: [phone]

## 2017-12-27 NOTE — PROGRESS NOTES
Medium    Hypertension [I10] 08/25/2014     Priority: Medium    Type 2 diabetes with nephropathy ([facility] [address] [E11.21] 08/25/2014     Priority: Medium     Class: Chronic    Debility [R53.81] 04/03/2017    Calculus of kidney [N20.0] 02/11/2015       Plan:        1. Continue IV fluids- dextrose saline  2. Hypoglycemia protocol  3.  Surgery plans intervention tomorrow if does not get bowel movement    Wesley Moore MD  12/27/2017  11:10 AM

## 2017-12-27 NOTE — FLOWSHEET NOTE
DATE: 2017    NAME: Lilly Marshall  MRN: [id number]   : 1925    Patient not seen this date for Physical Therapy due to:  [] Blood transfusion in progress  [] Hemodialysis  []  Patient Declined  [] Spine Precautions   [] Strict Bedrest  [] Surgery/ Procedure  [] Testing      [x] Other; breathing treatment and then in restroom. Will check back this PM time permitting. [] PT being discontinued at this time. Patient independent. No further needs. [] PT being discontinued at this time as the patient has been transferred to palliative care. No further needs.     Benjamen Gear, PTA

## 2017-12-27 NOTE — PROGRESS NOTES
GENERAL: cooperative  NEUROLOGIC: alert, oriented, normal speech, no focal findings or movement disorder noted  LUNGS: clear to auscultation bilaterally- no wheezes, rales or rhonchi, normal air movement, no respiratory distress  HEART: normal rate, normal S1 and S2,  ABDOMEN: soft, tender to palpation hypoactive bowel sounds, no masses or organomegaly and distention present-   WOUNDS: none  EXTREMITY: no cyanosis and no clubbing    24 HR INTAKE/OUTPUT:   Intake/Output Summary (Last 24 hours) at 12/27/17 0940  Last data filed at 12/27/17 0549   Gross per 24 hour   Intake             1455 ml   Output              900 ml   Net              555 ml       Chest X-Ray: See radiology report    LABS:  CBC: Recent Labs      12/26/17   0632  12/27/17   0604   WBC  8.4  6.8   HGB  9.8*  9.4*   HCT  33.4*  30.9*   MCV  90.8  88.3   PLT  253  252     BMP: [email or web address]  COAGS:   Recent Labs      12/25/17   1430  12/26/17   0632   PROT  6.2*  7.0   INR   --   0.9     PANCREAS:    Recent Labs      12/26/17   0632   LIPASE  12*   AMYLASE  12*     LIVER: Recent Labs      12/26/17   0632   AST  75*   ALT  181*   BILIDIR  0.12   BILITOT  0.27*   ALKPHOS  254*     CBC:   Lab Results   Component Value Date    WBC 6.8 12/27/2017    RBC 3.50 12/27/2017    RBC 0 05/25/2016    HGB 9.4 12/27/2017    HCT 30.9 12/27/2017    MCV 88.3 12/27/2017    MCH 26.9 12/27/2017    MCHC 30.4 12/27/2017    RDW 15.1 12/27/2017     12/27/2017     10/26/2011    MPV 12.0 12/27/2017     BMP:    Lab Results   Component Value Date     12/27/2017    K 3.8 12/27/2017     12/27/2017    CO2 24 12/27/2017    BUN 28 12/27/2017    LABALBU 3.1 12/26/2017    CREATININE 1.51 12/27/2017    CALCIUM 8.0 12/27/2017    GFRAA 39 12/27/2017    LABGLOM 32 12/27/2017    GLUCOSE 140 12/27/2017    GLUCOSE 124 05/19/2016       Sofia Alvarado MD  12/27/17, 9:40 AM

## 2017-12-27 NOTE — PROGRESS NOTES
indicated. FL UGI W Small Bowel on 12/26/2017:  Impression   Slow passage of contrast through the loops of bowel measure up to 4.8 cm. Stool is noted in the colon.  Although findings can be seen with ileus there   are concerning for at least partial bowel obstruction. Progress radiographs   to continue monitoring progression of contrast is recommended. KUB on 12/27/2017:  Impression   1. Oral contrast remains within dilated loops of small bowel, measuring up to   3.8 cm in diameter.  No oral contrast reaches the colon.  Findings are   compatible with ileus or small-bowel obstruction.  Continued radiographic   follow-up recommend. 2. Enteric catheter in satisfactory position.          Anatoliy Rendon CNP  12/26/17, 8:13 AM

## 2017-12-27 NOTE — PLAN OF CARE
Melany Pabon 19    Second Visit Note  For more detailed information please refer to the progress note of the day      12/27/2017    3:13 PM    Name:   Val Valentine  MRN:     [id number]     [place]:      [de-identified]   Room:   [facility] Day:  2  Admit Date:  12/25/2017 12:01 AM    PCP:   Zoey Rangel MD  Code Status:  DNR-CCA        Pt vitals were reviewed   New labs were reviewed   Patient was seen    Updated plan :     1. No flatus or bowel movement so far  2. Patient aware of the plan for surgery tomorrow  3.  Continue same treatment plan          Candido Riggins MD  12/27/2017  3:13 PM

## 2017-12-28 ENCOUNTER — APPOINTMENT (OUTPATIENT)
Dept: GENERAL RADIOLOGY | Age: 82
DRG: 389 | End: 2017-12-28
Attending: FAMILY MEDICINE
Payer: MEDICARE

## 2017-12-28 LAB
ANION GAP SERPL CALCULATED.3IONS-SCNC: 11 MMOL/L (ref 9–17)
BUN BLDV-MCNC: 20 MG/DL (ref 8–23)
BUN/CREAT BLD: ABNORMAL (ref 9–20)
CALCIUM SERPL-MCNC: 7.9 MG/DL (ref 8.6–10.4)
CHLORIDE BLD-SCNC: 107 MMOL/L (ref 98–107)
CO2: 25 MMOL/L (ref 20–31)
CREAT SERPL-MCNC: 1.21 MG/DL (ref 0.5–0.9)
CULTURE: NORMAL
GFR AFRICAN AMERICAN: 50 ML/MIN
GFR NON-AFRICAN AMERICAN: 42 ML/MIN
GFR SERPL CREATININE-BSD FRML MDRD: ABNORMAL ML/MIN/{1.73_M2}
GFR SERPL CREATININE-BSD FRML MDRD: ABNORMAL ML/MIN/{1.73_M2}
GLUCOSE BLD-MCNC: 123 MG/DL (ref 65–105)
GLUCOSE BLD-MCNC: 124 MG/DL (ref 65–105)
GLUCOSE BLD-MCNC: 131 MG/DL (ref 65–105)
GLUCOSE BLD-MCNC: 134 MG/DL (ref 70–99)
HCT VFR BLD CALC: 30.1 % (ref 36.3–47.1)
HEMOGLOBIN: 9.3 G/DL (ref 11.9–15.1)
Lab: NORMAL
Lab: NORMAL
MCH RBC QN AUTO: 26.6 PG (ref 25.2–33.5)
MCHC RBC AUTO-ENTMCNC: 30.9 G/DL (ref 28.4–34.8)
MCV RBC AUTO: 86.2 FL (ref 82.6–102.9)
PDW BLD-RTO: 15.6 % (ref 11.8–14.4)
PLATELET # BLD: 247 K/UL (ref 138–453)
PMV BLD AUTO: 11.6 FL (ref 8.1–13.5)
POTASSIUM SERPL-SCNC: 3.4 MMOL/L (ref 3.7–5.3)
RBC # BLD: 3.49 M/UL (ref 3.95–5.11)
SODIUM BLD-SCNC: 143 MMOL/L (ref 135–144)
SPECIMEN DESCRIPTION: NORMAL
SPECIMEN DESCRIPTION: NORMAL
STATUS: NORMAL
STATUS: NORMAL
WBC # BLD: 7.4 K/UL (ref 3.5–11.3)

## 2017-12-28 PROCEDURE — 76937 US GUIDE VASCULAR ACCESS: CPT

## 2017-12-28 PROCEDURE — 6370000000 HC RX 637 (ALT 250 FOR IP): Performed by: INTERNAL MEDICINE

## 2017-12-28 PROCEDURE — 2580000003 HC RX 258: Performed by: INTERNAL MEDICINE

## 2017-12-28 PROCEDURE — 2500000003 HC RX 250 WO HCPCS: Performed by: FAMILY MEDICINE

## 2017-12-28 PROCEDURE — 94762 N-INVAS EAR/PLS OXIMTRY CONT: CPT

## 2017-12-28 PROCEDURE — 82947 ASSAY GLUCOSE BLOOD QUANT: CPT

## 2017-12-28 PROCEDURE — 2060000000 HC ICU INTERMEDIATE R&B

## 2017-12-28 PROCEDURE — 6360000002 HC RX W HCPCS: Performed by: INTERNAL MEDICINE

## 2017-12-28 PROCEDURE — 74000 XR ABDOMEN LIMITED (KUB): CPT

## 2017-12-28 PROCEDURE — 80048 BASIC METABOLIC PNL TOTAL CA: CPT

## 2017-12-28 PROCEDURE — 99232 SBSQ HOSP IP/OBS MODERATE 35: CPT | Performed by: INTERNAL MEDICINE

## 2017-12-28 PROCEDURE — 36415 COLL VENOUS BLD VENIPUNCTURE: CPT

## 2017-12-28 PROCEDURE — 2500000003 HC RX 250 WO HCPCS: Performed by: INTERNAL MEDICINE

## 2017-12-28 PROCEDURE — S0028 INJECTION, FAMOTIDINE, 20 MG: HCPCS | Performed by: FAMILY MEDICINE

## 2017-12-28 PROCEDURE — 85027 COMPLETE CBC AUTOMATED: CPT

## 2017-12-28 RX ORDER — CIPROFLOXACIN 2 MG/ML
400 INJECTION, SOLUTION INTRAVENOUS EVERY 12 HOURS
Status: DISCONTINUED | OUTPATIENT
Start: 2017-12-28 | End: 2017-12-28

## 2017-12-28 RX ORDER — CIPROFLOXACIN 2 MG/ML
400 INJECTION, SOLUTION INTRAVENOUS EVERY 24 HOURS
Status: DISCONTINUED | OUTPATIENT
Start: 2017-12-28 | End: 2017-12-29 | Stop reason: CLARIF

## 2017-12-28 RX ORDER — GABAPENTIN 100 MG/1
100 CAPSULE ORAL 3 TIMES DAILY
Status: DISCONTINUED | OUTPATIENT
Start: 2017-12-28 | End: 2017-12-29 | Stop reason: HOSPADM

## 2017-12-28 RX ORDER — FUROSEMIDE 10 MG/ML
20 INJECTION INTRAMUSCULAR; INTRAVENOUS ONCE
Status: COMPLETED | OUTPATIENT
Start: 2017-12-28 | End: 2017-12-28

## 2017-12-28 RX ADMIN — FUROSEMIDE 20 MG: 10 INJECTION, SOLUTION INTRAMUSCULAR; INTRAVENOUS at 12:55

## 2017-12-28 RX ADMIN — LEVOTHYROXINE SODIUM ANHYDROUS 37.5 MCG: 100 INJECTION, POWDER, LYOPHILIZED, FOR SOLUTION INTRAVENOUS at 08:17

## 2017-12-28 RX ADMIN — GABAPENTIN 100 MG: 100 CAPSULE ORAL at 12:55

## 2017-12-28 RX ADMIN — Medication 240 ML: at 13:20

## 2017-12-28 RX ADMIN — CIPROFLOXACIN 400 MG: 2 INJECTION, SOLUTION INTRAVENOUS at 19:43

## 2017-12-28 RX ADMIN — GABAPENTIN 100 MG: 100 CAPSULE ORAL at 21:44

## 2017-12-28 RX ADMIN — FAMOTIDINE 20 MG: 10 INJECTION, SOLUTION INTRAVENOUS at 08:16

## 2017-12-28 RX ADMIN — METRONIDAZOLE 500 MG: 500 INJECTION, SOLUTION INTRAVENOUS at 17:23

## 2017-12-28 RX ADMIN — Medication 5 ML: at 08:17

## 2017-12-28 NOTE — CONSULTS
SOLN nebulizer solution Inhale 3 mLs into the lungs 4 times daily 11/27/17   [facility] ALTON Youssef PA-C   amLODIPine (NORVASC) 10 MG tablet Take 1 tablet by mouth daily 11/15/17   Lane Son MD   hydrochlorothiazide (MICROZIDE) 12.5 MG capsule Take 12.5 mg by mouth daily    Historical Provider, MD   rosuvastatin (CRESTOR) 5 MG tablet TAKE 1 TABLET BY MOUTH ONCE A DAY 11/14/17   Lane Son MD   gabapentin (NEURONTIN) 100 MG capsule TAKE 1 CAPSULE BY MOUTH THREE TIMES DAILY 9/19/17   Lane Son MD   glimepiride (AMARYL) 1 MG tablet TAKE 1 TABLET BY MOUTH ONCE DAILY IN THE MORNING 7/18/17   Lane Son MD   allopurinol (ZYLOPRIM) 300 MG tablet Take 1 tablet by mouth daily 6/12/17 11/13/26  Lane Son MD   lactase (LACTAID) 3000 UNITS tablet Take 4 tablets by mouth 3 times daily (with meals)     Historical Provider, MD   clopidogrel (PLAVIX) 75 MG tablet TAKE 1 TABLET BY MOUTH ONCE A DAY 4/28/17   Lane Son MD   levothyroxine (SYNTHROID) 88 MCG tablet Take 88 mcg by mouth daily  2/4/17   Historical Provider, MD   ferrous sulfate 325 (65 FE) MG tablet Take 325 mg by mouth daily (with breakfast). Historical Provider, MD   meclizine (ANTIVERT) 25 MG tablet Take 25 mg by mouth daily     Historical Provider, MD     .    CURRENT MEDICATIONS:  .  Scheduled Meds:   gabapentin  100 mg Oral TID    [MAR Hold] scopolamine  1 patch Transdermal Q72H    [MAR Hold] enoxaparin  30 mg Subcutaneous Daily    [MAR Hold] famotidine (PEPCID) injection  20 mg Intravenous Daily    [MAR Hold] sodium chloride flush  10 mL Intravenous 2 times per day    [MAR Hold] levothyroxine  37.5 mcg Intravenous Daily    And    [MAR Hold] sodium chloride (PF)  5 mL Intravenous Daily    [MAR Hold] insulin lispro  0-6 Units Subcutaneous TID WC    [MAR Hold] insulin lispro  0-3 Units Subcutaneous Nightly     .   Continuous Infusions:   [MAR Hold] dextrose      [MAR Hold] dextrose 5 % and 0.9 % NaCl 50 mL/hr at 12/27/17 1700 .  PRN Meds:[MAR Hold] glucose, [MAR Hold] dextrose, [MAR Hold] glucagon (rDNA), [MAR Hold] dextrose, [MAR Hold] phenol, [MAR Hold] magnesium sulfate, [MAR Hold] ondansetron, [MAR Hold] potassium chloride, [MAR Hold] sodium chloride flush, [MAR Hold] ipratropium-albuterol, [MAR Hold] acetaminophen, [MAR Hold] hydrALAZINE, [MAR Hold] benzocaine  . SOCIAL HISTORY:     Social History     Social History    Marital status:      Spouse name: N/A    Number of children: N/A    Years of education: N/A     Occupational History    Not on file. Social History Main Topics    Smoking status: Never Smoker    Smokeless tobacco: Never Used    Alcohol use No    Drug use: No    Sexual activity: Not on file     Other Topics Concern    Not on file     Social History Narrative    No narrative on file       FAMILY HISTORY:   Family History   Problem Relation Age of Onset    Stroke Mother     Heart Disease Father        REVIEW OF SYSTEMS:     Constitutional: No fever, no chills, no lethargy, no weakness. HEENT:  No headache, otalgia, itchy eyes, nasal discharge or sore throat. Cardiac:  No chest pain, dyspnea, orthopnea or PND. Chest:   No cough, phlegm or wheezing. Abdomen:  No abdominal pain, nausea or vomiting. Neuro:  No focal weakness, abnormal movements or seizure like activity. Skin:   No rashes, no itching. :   No hematuria, no pyuria, no dysuria, no flank pain. Extremities:  No swelling or joint pains. ROS was otherwise negative except as mentioned in the [address] PHYSICAL EXAM:    BP (!) 155/63   Pulse 68   Temp 97.4 °F (36.3 °C) (Oral)   Resp 18   Ht 5' 1\" (1.549 m)   Wt 155 lb 3.2 oz (70.4 kg)   SpO2 97%   BMI 29.32 kg/m²   . TMAX[24]  GENERAL: Well developed, Well nourished, No apparent distress  HEAD:  Normocephalic, Atraumatic  EENT: EOMI, Sclera not icteric, Oropharynx moist  NECK:  Supple, Trachea midline  LUNGS:CTA Bilaterally  HEART: RRR, No murmur, No rub, No gallop, of prior granulomatous disease. There likely bilateral renal cysts. Followup post-contrast CT or ultrasound recommended for further evaluation. All of the stones previously seen in the kidneys are again felt to be present. Previously seen stone in the right distal ureter appears to be about the same level as on the prior exam. No advancement has occurred in the last 2 days. There are again noted multiple distended loops of bowel which appear to have gotten worse since the previous exam. Thickening of the terminal ileum again noted. There is diverticulosis without evidence of diverticulitis. There is a small amount of free fluid in the pelvis. There are degenerative changes of the spine and bones. There is a small fat containing umbilical hernia. There is atherosclerotic calcification. Impression:  All of the stones previously seen in the kidneys are again felt to be present. Previously seen stone in the right distal ureter appears to be about the same level as on the prior exam. No advancement has occurred in the last 2 days. Bowel distention has gotten worse consistent with persistent small-bowel obstruction. There is again noted thickening of the terminal ileum. Incidental findings as above. Unexpected finding protocol initiated. Electronically Signed By: Randy Bates   on  12/24/2017  09:55    Ct Abdomen Pelvis Wo Iv Contrast    Result Date: 12/22/2017  FINAL REPORT EXAM:  CT ABDOMEN PELVIS WO IV CONTRAST HISTORY:  Abdominal pain TECHNIQUE:  CT images are acquired through the Abdomen and Pelvis without IV contrast. Coronal and sagittal reformations are provided. PRIORS:  None FINDINGS:  Partially visualized intrathoracic contents are unremarkable. The liver,  pancreas, spleen, and adrenal glands are normal. The gallbladder is absent There is a 3.2 mm stone in the distal right ureter. No significant hydronephrosis of the right kidney identified.  There are multiple other bilateral nonobstructive renal contrast, now extending to the transverse colon. Air is seen in the distal colon/ rectum. There is persistent mild prominence of several small bowel loops measuring up to 3.9 cm. Visualized lung bases appear well aerated. Visualized osseous structures appear mildly demineralized. Continued progression of contrast now seen to the level of the transverse colon with persistent mildly dilated loops of small bowel measure up to 3.9 cm. Findings can be seen with ileus/partial obstruction. Progress radiographs may be of further diagnostic aid, as indicated. Xr Abdomen (kub) (single Ap View)    Result Date: 12/27/2017  EXAMINATION: SUPINE VIEW(S) OF THE ABDOMEN 12/27/2017 4:22 am COMPARISON: Small bowel follow-through 12/26/2017, abdominal radiographs 12/26/2017 HISTORY: ORDERING SYSTEM PROVIDED HISTORY: SBO TECHNOLOGIST PROVIDED HISTORY: Reason for exam:->SBO FINDINGS: Enteric catheter is present with side port below the GE junction. There remains well contours within multiple small bowel loops which are dilated, measuring up to 3.8 cm in diameter. Oral contrast has not reached the colon. Small left pleural effusion and left basilar atelectasis. Thoracic aortic atherosclerotic disease. 1. Oral contrast remains within dilated loops of small bowel, measuring up to 3.8 cm in diameter. No oral contrast reaches the colon. Findings are compatible with ileus or small-bowel obstruction. Continued radiographic follow-up recommend. 2. Enteric catheter in satisfactory position. Xr Abdomen (kub) (single Ap View)    Result Date: 12/26/2017  EXAMINATION: SUPINE VIEW(S) OF THE ABDOMEN 12/26/2017 7:03 am COMPARISON: Abdominal radiographs from 12/25/2017. CT abdomen/pelvis from 12/24/2017. HISTORY: ORDERING SYSTEM PROVIDED HISTORY: comparison TECHNOLOGIST PROVIDED HISTORY: Reason for exam:->comparison Small-bowel obstruction seen on a prior imaging studies. FINDINGS: Enteric tube extends to the stomach.   There

## 2017-12-28 NOTE — PLAN OF CARE
Melany Pabon 19    Second Visit Note  For more detailed information please refer to the progress note of the day      12/28/2017    6:01 PM    Name:   Keron Matamoros  MRN:     [id number]     Maggieide:      [de-identified]   Room:   [facility] Day:  3  Admit Date:  12/25/2017 12:01 AM    PCP:   Oh Hickman MD  Code Status:  DNR-CCA        Pt vitals were reviewed   New labs were reviewed   Patient was seen    Updated plan :     1. Had 2 large bowel movements after milk of molasses enema  2.  Evaluated by GI with following impression:               -Lower abd pain with terminal ileitis on CT scan - possible infectious vs first episode of IBD even though unlikely   Will start on Cipro and Flagyl IV   3.  patient is feeling much more comfortable after bowel movements        Priscilla Buckner MD  12/28/2017  6:01 PM

## 2017-12-28 NOTE — PROGRESS NOTES
hours) at 12/28/17 0750  Last data filed at 12/28/17 0549   Gross per 24 hour   Intake          1982.63 ml   Output             2075 ml   Net           -92.37 ml       Labs:    Hematology:  Recent Labs      12/26/17   9275  12/27/17   0604  12/28/17   0623   WBC  8.4  6.8  7.4   HGB  9.8*  9.4*  9.3*   HCT  33.4*  30.9*  30.1*   PLT  253  252  247   INR  0.9   --    --      Chemistry:  Recent Labs      12/26/17   0632  12/27/17   0604  12/28/17   0623   NA  141  144  143   K  4.2  3.8  3.4*   CL  101  106  107   CO2  18*  24  25   GLUCOSE  69*  140*  134*   BUN  39*  28*  20   CREATININE  1.77*  1.51*  1.21*   MG  2.3   --    --    ANIONGAP  22*  14  11   LABGLOM  27*  32*  42*   GFRAA  33*  39*  50*   CALCIUM  8.2*  8.0*  7.9*   CAION  1.17   --    --      Recent Labs      12/25/17   1109  12/25/17   1430  12/26/17   0632   PROT   --   6.2*  7.0   LABALBU   --    --   3.1*   TSH  5.23*   --    --    AST   --    --   75*   ALT   --    --   181*   ALKPHOS   --    --   254*   BILITOT   --    --   0.27*   BILIDIR   --    --   0.12   AMYLASE   --    --   12*   LIPASE   --    --   12*   TRIG   --    --   189*         Lab Results   Component Value Date/Time    SPECIAL NOT REPORTED 12/23/2017 05:05 PM     Lab Results   Component Value Date/Time    CULTURE NO SIGNIFICANT GROWTH 12/23/2017 05:05 PM    CULTURE  12/23/2017 05:05 PM     Performed at [facility], [address] [place] [address] [phone]       Lab Results   Component Value Date    PH 6.0 05/25/2016    FIO2 NOT REPORTED 03/25/2017       Radiology:  KUB  1. Similar degree of small-bowel dilation compatible with at least partial   small bowel obstruction.  Progress imaging as indicated is suggested. 2. Suspected bibasilar atelectatic change.  Could consider dedicated chest   radiographs for further evaluation, as indicated. Small bowel follow-through  Slow passage of contrast through the loops of bowel measure up to 4.8 cm.    Stool is noted in the colon.  Although findings can be seen with ileus there   are concerning for at least partial bowel obstruction. Progress radiographs   to continue monitoring progression of contrast is recommended   KUB  1. Oral contrast remains within dilated loops of small bowel, measuring up to   3.8 cm in diameter.  No oral contrast reaches the colon.  Findings are   compatible with ileus or small-bowel obstruction.  Continued radiographic   follow-up recommend. 2. Enteric catheter in satisfactory position.          Physical Examination:        General appearance:  alert, cooperative and no distress,NG tube Has been removed   Mental Status:  oriented to person, place and time and normal affect  Lungs:  clear to auscultation bilaterally, normal effort, few basal rales  Heart:  regular rate and rhythm, no murmur  Abdomen:  soft, nontender except mild discomfort in descending colon area, sluggish bowel sounds, no masses, hepatomegaly, splenomegaly  Extremities:  no edema, redness, tenderness in the calves  Skin:  no gross lesions, rashes, induration    Assessment:        Primary Problem  Small bowel obstruction-Passed flatus    Active Hospital Problems    Diagnosis Date Noted    Elevated liver enzymes [R74.8] 12/25/2017     Priority: High    Small bowel obstruction [K56.609] 12/24/2017     Priority: High    Dehydration [E86.0] 12/23/2017     Priority: High    Acute on chronic renal failure (HCC) [N17.9, N18.9] 11/22/2017     Priority: High    Chronic diastolic heart failure (HCC) [I50.32]      Priority: High    CKD (chronic kidney disease) stage 3, GFR 30-59 ml/min [N18.3] 12/19/2017     Priority: Medium    Severe mitral regurgitation by prior echocardiogram [I34.0]      Priority: Medium    Chronic obstructive pulmonary disease ([address] [J44.9] 04/06/2017     Priority: Medium    Malignant neoplasm of thyroid gland ([facility] [address] Maldonado Kwok 02/11/2015     Priority: Medium    Hypertension [I10] 08/25/2014     Priority: Medium    Type 2 diabetes with nephropathy ([facility] [address] [E11.21] 08/25/2014     Priority: Medium     Class: Chronic    Debility [R53.81] 04/03/2017    Calculus of kidney [N20.0] 02/11/2015       Plan:        1. Continue IV fluids- dextrose saline  2. Hypoglycemia protocol  3. Surgery Has been canceled their evaluation contrast has made its way to her colon and then recommending GI consult due to delayed passage time of contrast to her bowels  4.  Restarted on oral Neurontin on request of family for her neuropathy    Sherlyn Escoto MD  12/28/2017  7:50 AM

## 2017-12-28 NOTE — PLAN OF CARE
Problem: Risk for Impaired Skin Integrity  Goal: Tissue integrity - skin and mucous membranes  Structural intactness and normal physiological function of skin and  mucous membranes. Outcome: Ongoing  Patient remains at risk for impaired skin and tissue integrity due to decreased mobility. Patient is able to move freely in bed and is up walking with assistance and a walker. Frequent skin assessments have been performed throughout this shift and no new areas of concern. Problem: Falls - Risk of  Goal: Absence of falls  Outcome: Ongoing  Patient remains free from falls throughout this shift. Patient calls out appropriately for assistance. Bed alarm is on bed. Wheels are locked and bed in lowest position. Problem: Pain:  Goal: Pain level will decrease  Pain level will decrease   Outcome: Ongoing  Patient continues to have decreased pain throughout this shift. Frequent pain assessments have been performed throughout this shift and patient has denied pain.

## 2017-12-28 NOTE — PROGRESS NOTES
clear to auscultation bilaterally- no wheezes, rales or rhonchi, normal air movement, no respiratory distress  HEART: normal rate, normal S1 and S2, no gallops, intact distal pulses and no carotid bruits  ABDOMEN: soft, non-tender, non-distended, normal bowel sounds, no masses or organomegaly  WOUNDS:   EXTREMITY: no cyanosis and no clubbing    24 HR INTAKE/OUTPUT:   Intake/Output Summary (Last 24 hours) at 12/28/17 1211  Last data filed at 12/28/17 0549   Gross per 24 hour   Intake          1982.63 ml   Output             2075 ml   Net           -92.37 ml       Chest X-Ray: See radiology report    LABS:  CBC: Recent Labs      12/26/17   0632  12/27/17   0604  12/28/17   0623   WBC  8.4  6.8  7.4   HGB  9.8*  9.4*  9.3*   HCT  33.4*  30.9*  30.1*   MCV  90.8  88.3  86.2   PLT  253  252  247     BMP: [email or web address]  COAGS:   Recent Labs      12/25/17   1430  12/26/17   0632   PROT  6.2*  7.0   INR   --   0.9     PANCREAS:    Recent Labs      12/26/17   0632   LIPASE  12*   AMYLASE  12*     LIVER: Recent Labs      12/26/17   0632   AST  75*   ALT  181*   BILIDIR  0.12   BILITOT  0.27*   ALKPHOS  254*     CBC:   Lab Results   Component Value Date    WBC 7.4 12/28/2017    RBC 3.49 12/28/2017    RBC 0 05/25/2016    HGB 9.3 12/28/2017    HCT 30.1 12/28/2017    MCV 86.2 12/28/2017    MCH 26.6 12/28/2017    MCHC 30.9 12/28/2017    RDW 15.6 12/28/2017     12/28/2017     10/26/2011    MPV 11.6 12/28/2017     BMP:    Lab Results   Component Value Date     12/28/2017    K 3.4 12/28/2017     12/28/2017    CO2 25 12/28/2017    BUN 20 12/28/2017    LABALBU 3.1 12/26/2017    CREATININE 1.21 12/28/2017    CALCIUM 7.9 12/28/2017    GFRAA 50 12/28/2017    LABGLOM 42 12/28/2017    GLUCOSE 134 12/28/2017    GLUCOSE 124 05/19/2016       Baltazar Flores MD  12/28/17, 12:11 PM

## 2017-12-29 ENCOUNTER — APPOINTMENT (OUTPATIENT)
Dept: GENERAL RADIOLOGY | Age: 82
DRG: 389 | End: 2017-12-29
Attending: FAMILY MEDICINE
Payer: MEDICARE

## 2017-12-29 VITALS
HEIGHT: 61 IN | HEART RATE: 69 BPM | DIASTOLIC BLOOD PRESSURE: 60 MMHG | SYSTOLIC BLOOD PRESSURE: 148 MMHG | OXYGEN SATURATION: 95 % | TEMPERATURE: 98.1 F | BODY MASS INDEX: 29.3 KG/M2 | RESPIRATION RATE: 16 BRPM | WEIGHT: 155.2 LBS

## 2017-12-29 LAB
ANION GAP SERPL CALCULATED.3IONS-SCNC: 12 MMOL/L (ref 9–17)
BUN BLDV-MCNC: 22 MG/DL (ref 8–23)
BUN/CREAT BLD: ABNORMAL (ref 9–20)
CALCIUM SERPL-MCNC: 7.3 MG/DL (ref 8.6–10.4)
CHLORIDE BLD-SCNC: 109 MMOL/L (ref 98–107)
CO2: 24 MMOL/L (ref 20–31)
CREAT SERPL-MCNC: 1.21 MG/DL (ref 0.5–0.9)
GFR AFRICAN AMERICAN: 50 ML/MIN
GFR NON-AFRICAN AMERICAN: 42 ML/MIN
GFR SERPL CREATININE-BSD FRML MDRD: ABNORMAL ML/MIN/{1.73_M2}
GFR SERPL CREATININE-BSD FRML MDRD: ABNORMAL ML/MIN/{1.73_M2}
GLUCOSE BLD-MCNC: 102 MG/DL (ref 70–99)
GLUCOSE BLD-MCNC: 151 MG/DL (ref 65–105)
GLUCOSE BLD-MCNC: 89 MG/DL (ref 65–105)
HCT VFR BLD CALC: 28.4 % (ref 36.3–47.1)
HEMOGLOBIN: 8.8 G/DL (ref 11.9–15.1)
MCH RBC QN AUTO: 27.1 PG (ref 25.2–33.5)
MCHC RBC AUTO-ENTMCNC: 31 G/DL (ref 28.4–34.8)
MCV RBC AUTO: 87.4 FL (ref 82.6–102.9)
PDW BLD-RTO: 15.9 % (ref 11.8–14.4)
PLATELET # BLD: 244 K/UL (ref 138–453)
PMV BLD AUTO: 11.5 FL (ref 8.1–13.5)
POTASSIUM SERPL-SCNC: 3.6 MMOL/L (ref 3.7–5.3)
RBC # BLD: 3.25 M/UL (ref 3.95–5.11)
SODIUM BLD-SCNC: 145 MMOL/L (ref 135–144)
WBC # BLD: 9.4 K/UL (ref 3.5–11.3)

## 2017-12-29 PROCEDURE — 94762 N-INVAS EAR/PLS OXIMTRY CONT: CPT

## 2017-12-29 PROCEDURE — 36415 COLL VENOUS BLD VENIPUNCTURE: CPT

## 2017-12-29 PROCEDURE — 82947 ASSAY GLUCOSE BLOOD QUANT: CPT

## 2017-12-29 PROCEDURE — 99239 HOSP IP/OBS DSCHRG MGMT >30: CPT | Performed by: INTERNAL MEDICINE

## 2017-12-29 PROCEDURE — 97116 GAIT TRAINING THERAPY: CPT

## 2017-12-29 PROCEDURE — 6370000000 HC RX 637 (ALT 250 FOR IP): Performed by: INTERNAL MEDICINE

## 2017-12-29 PROCEDURE — 2500000003 HC RX 250 WO HCPCS: Performed by: INTERNAL MEDICINE

## 2017-12-29 PROCEDURE — 6360000002 HC RX W HCPCS: Performed by: FAMILY MEDICINE

## 2017-12-29 PROCEDURE — 85027 COMPLETE CBC AUTOMATED: CPT

## 2017-12-29 PROCEDURE — 97110 THERAPEUTIC EXERCISES: CPT

## 2017-12-29 PROCEDURE — 80048 BASIC METABOLIC PNL TOTAL CA: CPT

## 2017-12-29 PROCEDURE — 74000 XR ABDOMEN LIMITED (KUB): CPT

## 2017-12-29 PROCEDURE — 2500000003 HC RX 250 WO HCPCS: Performed by: FAMILY MEDICINE

## 2017-12-29 PROCEDURE — 2580000003 HC RX 258: Performed by: FAMILY MEDICINE

## 2017-12-29 PROCEDURE — 2580000003 HC RX 258: Performed by: INTERNAL MEDICINE

## 2017-12-29 PROCEDURE — S0028 INJECTION, FAMOTIDINE, 20 MG: HCPCS | Performed by: FAMILY MEDICINE

## 2017-12-29 RX ORDER — LEVOTHYROXINE SODIUM 0.07 MG/1
75 TABLET ORAL DAILY
Status: DISCONTINUED | OUTPATIENT
Start: 2017-12-30 | End: 2017-12-29 | Stop reason: HOSPADM

## 2017-12-29 RX ORDER — METRONIDAZOLE 500 MG/1
500 TABLET ORAL 3 TIMES DAILY
Qty: 21 TABLET | Refills: 0 | Status: SHIPPED | OUTPATIENT
Start: 2017-12-29 | End: 2018-01-05

## 2017-12-29 RX ORDER — CIPROFLOXACIN 500 MG/1
500 TABLET, FILM COATED ORAL
Status: DISCONTINUED | OUTPATIENT
Start: 2017-12-29 | End: 2017-12-29 | Stop reason: HOSPADM

## 2017-12-29 RX ORDER — CIPROFLOXACIN 500 MG/1
500 TABLET, FILM COATED ORAL 2 TIMES DAILY
Qty: 14 TABLET | Refills: 0 | Status: SHIPPED | OUTPATIENT
Start: 2017-12-29 | End: 2018-01-05

## 2017-12-29 RX ORDER — FAMOTIDINE 20 MG/1
20 TABLET, FILM COATED ORAL DAILY
Status: DISCONTINUED | OUTPATIENT
Start: 2017-12-30 | End: 2017-12-29 | Stop reason: HOSPADM

## 2017-12-29 RX ORDER — METRONIDAZOLE 500 MG/1
500 TABLET ORAL EVERY 8 HOURS SCHEDULED
Status: DISCONTINUED | OUTPATIENT
Start: 2017-12-29 | End: 2017-12-29 | Stop reason: HOSPADM

## 2017-12-29 RX ADMIN — METRONIDAZOLE 500 MG: 500 TABLET, FILM COATED ORAL at 16:18

## 2017-12-29 RX ADMIN — LEVOTHYROXINE SODIUM ANHYDROUS 37.5 MCG: 100 INJECTION, POWDER, LYOPHILIZED, FOR SOLUTION INTRAVENOUS at 09:10

## 2017-12-29 RX ADMIN — Medication 5 ML: at 09:30

## 2017-12-29 RX ADMIN — ENOXAPARIN SODIUM 30 MG: 30 INJECTION SUBCUTANEOUS at 09:09

## 2017-12-29 RX ADMIN — GABAPENTIN 100 MG: 100 CAPSULE ORAL at 09:09

## 2017-12-29 RX ADMIN — CIPROFLOXACIN 500 MG: 500 TABLET, FILM COATED ORAL at 18:15

## 2017-12-29 RX ADMIN — INSULIN LISPRO 1 UNITS: 100 INJECTION, SOLUTION INTRAVENOUS; SUBCUTANEOUS at 15:13

## 2017-12-29 RX ADMIN — METRONIDAZOLE 500 MG: 500 INJECTION, SOLUTION INTRAVENOUS at 01:17

## 2017-12-29 RX ADMIN — GABAPENTIN 100 MG: 100 CAPSULE ORAL at 16:18

## 2017-12-29 RX ADMIN — Medication 10 ML: at 09:15

## 2017-12-29 RX ADMIN — METRONIDAZOLE 500 MG: 500 INJECTION, SOLUTION INTRAVENOUS at 09:09

## 2017-12-29 RX ADMIN — FAMOTIDINE 20 MG: 10 INJECTION, SOLUTION INTRAVENOUS at 09:09

## 2017-12-29 NOTE — CARE COORDINATION
Spoke with Tatyana desouza for [facility]. She states that they should be able to accept the patient, but just have to check on nurse availability. Updated with son and daughters phone numbers to be faxed to them on facesheet. Faxed discharge information to [facility].
by Sarah Aguilar RN on 12/25/17 at 11:19 AM

## 2017-12-29 NOTE — PROGRESS NOTES
PROGRESS NOTE          PATIENT NAME: Betzy [facility] RECORD NO. [id number]  DATE: 2017  SURGEON: Dr. Ethel Harp: Fatuma Mauricio MD    HD: # 4    ASSESSMENT    Patient Active Problem List   Diagnosis    Hypertension    Type 2 diabetes with nephropathy ([facility] [address]    Hyperlipidemia    Left shoulder pain    Senile nuclear sclerosis    Malignant neoplasm of thyroid gland ([facility] [address]    Renal failure    Calculus of kidney    Anemia    Transient cerebral ischemia    Asthma    Backache    Debility    Anemia    Chronic obstructive pulmonary disease ([facility] [address]    Community acquired pneumonia of left lower lobe of lung ([facility] [address]    Acute on chronic renal failure (HCC)    Acute hyperkalemia    Chronic diastolic heart failure (HCC)    Severe mitral regurgitation by prior echocardiogram    Hypoxia    Acute on chronic combined systolic and diastolic CHF (congestive heart failure) (HCC)    CKD (chronic kidney disease) stage 3, GFR 30-59 ml/min    Dehydration    Small bowel obstruction    Elevated liver enzymes     SBO, fecalization of small bowel, large stool burden in right colon    MEDICAL DECISION MAKING AND PLAN    1. Advance diet as tolerated. 2. No surgical intervention planned at this time. 3. Surgery to sign off.  [facility] is much improved since yesterday. Patient states she is feeling much better today. Denies abdominal pain to palpation. States this is the first day her abdomen has not hurt her. Denies nausea, vomiting. States she had two bowel movements yesterday and one in the night.  Patient states she would like 'frosted flakes' for breakfast.    OBJECTIVE  VITALS: Temp: Temp: 98.3 °F (36.8 °C)Temp  Av °F (36.7 °C)  Min: 97.4 °F (36.3 °C)  Max: 98.3 °F (00.5 °C) BP Systolic (70FAG), FDW:304 , Min:155 , UWJ:555   Diastolic (51OWV), ALP:32, Min:63, Max:71   Pulse Pulse  Av.5  Min: 68  Max: 79 Resp Resp  Av.5  Min: 16  Max: 18 12/26/2017:  Impression   Slow passage of contrast through the loops of bowel measure up to 4.8 cm. Stool is noted in the colon.  Although findings can be seen with ileus there   are concerning for at least partial bowel obstruction. Progress radiographs   to continue monitoring progression of contrast is recommended. KUB on 12/27/2017:  Impression   1. Oral contrast remains within dilated loops of small bowel, measuring up to   3.8 cm in diameter.  No oral contrast reaches the colon.  Findings are   compatible with ileus or small-bowel obstruction.  Continued radiographic   follow-up recommend. 2. Enteric catheter in satisfactory position. KUB on 12/28/2017:  Impression   Continued progression of contrast now seen to the level of the transverse   colon with persistent mildly dilated loops of small bowel measure up to 3.9   cm.  Findings can be seen with ileus/partial obstruction.  Progress   radiographs may be of further diagnostic aid, as indicated. KUB on 12/29/2017:  Impression   Continued progression of enteric contrast the majority of which is now within   the colon.  Persistent mildly dilated loops of small bowel are identified. Overall findings are most suggestive of ileus.  Partial small bowel   obstruction is not excluded.  Progress radiographs could be considered for   further evaluation, as indicated. Danie Sherwood CNP  12/26/17, 7:52 AM       Trauma, Emergency and Critical Surgical Services  Attending Note      I have reviewed the above TECSS resident progress note and I either performed the key elements of the medical history and physical exam or was present when the resident performed them. I have discussed the findings, established the care plan and recommendations with resident, TECSS nurse and bedside nurse. The following confirms and/or amends the IMPRESSION, MEDICAL DECISION MAKING and PLAN from above.     ASSESSMENT    Patient Active Problem List   Diagnosis

## 2017-12-29 NOTE — PROGRESS NOTES
Skin Integrity, Fluid Balance, Weight, Pertinent Labs    See Adult Nutrition Doc Flowsheet for more detail.      Electronically signed by Crescencio De RD, LD on 12/29/17 at 3:35 PM    Contact Number: [phone]

## 2017-12-29 NOTE — PROGRESS NOTES
GASTROENTEROLOGY  PROGRESS NOTE       Chief Complaint :  PSBO     Subjective : Having bm , abd pain improved . Tolerating clears     CURRENT MEDICATIONS:  .  Scheduled Meds:   gabapentin  100 mg Oral TID    metroNIDAZOLE  500 mg Intravenous Q8H    ciprofloxacin  400 mg Intravenous Q24H    scopolamine  1 patch Transdermal Q72H    enoxaparin  30 mg Subcutaneous Daily    famotidine (PEPCID) injection  20 mg Intravenous Daily    sodium chloride flush  10 mL Intravenous 2 times per day    levothyroxine  37.5 mcg Intravenous Daily    And    sodium chloride (PF)  5 mL Intravenous Daily    insulin lispro  0-6 Units Subcutaneous TID WC    insulin lispro  0-3 Units Subcutaneous Nightly     . Continuous Infusions:   dextrose      dextrose 5 % and 0.9 % NaCl 50 mL/hr at 12/27/17 1700     . PRN Meds:glucose, dextrose, glucagon (rDNA), dextrose, phenol, magnesium sulfate, ondansetron, potassium chloride, sodium chloride flush, ipratropium-albuterol, acetaminophen, hydrALAZINE, benzocaine      PHYSICAL EXAM:    BP (!) 135/49   Pulse 60   Temp 98.2 °F (36.8 °C) (Oral)   Resp 16   Ht 5' 1\" (1.549 m)   Wt 155 lb 3.2 oz (70.4 kg)   SpO2 98%   BMI 29.32 kg/m²   . TMAX[24]  . GENERAL: Well developed, Well nourished, No apparent distress  HEAD:  Normocephalic, Atraumatic  EENT: EOMI, Sclera not icteric, Oropharynx moist  NECK:  Supple, Trachea midline  LUNGS:CTA Bilaterally  HEART: RRR, No murmur, No rub, No gallop, PMI nondisplaced. ABDOMEN:Soft, improved lower abd  tenderness , Not distended, BS WNL,  No masses. EXT:No clubbing. No cyanosis. No edema. SKIN: No rashes. No jaundice. No stigmata of liver disease.     NEURO:  A&O x Three, No focal neurological deficits        LABS and IMAGING:     CBC  Recent Labs      12/27/17   0604  12/28/17   0623  12/29/17   0622   WBC  6.8  7.4  9.4   HGB  9.4*  9.3*  8.8*   MCV  88.3  86.2  87.4   RDW  15.1*  15.6*  15.9*   PLT  252  247  244       ANEMIA STUDIES  No

## 2017-12-29 NOTE — PROGRESS NOTES
Melany Pabon 19    Progress Note    12/29/2017    7:32 AM    Name:   Pop Pineda  MRN:     [id number]     Acct:      [de-identified]   Room:   [facility]/[address] [place] [address] Day:  4  Admit Date:  12/25/2017 12:01 AM    PCP:   Franklin Brown MD  Code Status:  DNR-CCA    Subjective:     C/C: Abdominal pain/distention  Interval History Status:   1. Had 1 large bowel movement again and night and 1 small bowel movement today morning   2. Evaluated by GI with following impression:               -Lower abd pain with terminal ileitis on CT scan - possible infectious vs first episode of IBD even though unlikely   Will start on Cipro and Flagyl IV   3.  patient is feeling much more comfortable after bowel movements      Kidney function is improving, creatinine 1.77- 1.51 - 1.21- 1.21  Blood sugars 102  Hb 8.8  Brief History:   Patient is a 80 y. o. female who was a transfer from [facility] due to a small bowel obstruction. Pt originally presented to [facility] and was treated for pneumonia, CHF, and was found to have a right side kidney stone. Pt was transferred to [facility]. V's for further management of her SBO. Pt reports that she originally started having abdominal pain on 12/23/2017. Pt reports that her pain is all across her abdomen. Pt reports that her last BM was on Friday and she is not sure if she is passing flatus. Pt reports that she has been having some nausea but did vomit once yesterday. Pt reports that she did have a c scope 10 years ago and it was normal. Pt denies any fevers, chills, chest pain, or sob. Pt denies any history of colon cancer. CT scan shows a large stool burden in the right colon, fecalization of the small bowel, bowel distension, and persistent SBO with some thickening of the terminal ileum. Does complain of mild dysuria   Her liver enzymes are also raised.   TSH is 9.0  Creatinine 2.33(Baseline 1.5)        Review of Systems: Constitutional:  negative for chills, fevers, sweats  Respiratory:  negative for cough, dyspnea on exertion, hemoptysis, shortness of breath, wheezing  Cardiovascular:  negative for chest pain, chest pressure/discomfort, lower extremity edema, palpitations  Gastrointestinal:  Denies abdominal pain, + flatus, + bowel movement  Neurological:  negative for dizziness, headache    Medications: Allergies:  No Known Allergies    Current Meds:   Scheduled Meds:    gabapentin  100 mg Oral TID    metroNIDAZOLE  500 mg Intravenous Q8H    ciprofloxacin  400 mg Intravenous Q24H    scopolamine  1 patch Transdermal Q72H    enoxaparin  30 mg Subcutaneous Daily    famotidine (PEPCID) injection  20 mg Intravenous Daily    sodium chloride flush  10 mL Intravenous 2 times per day    levothyroxine  37.5 mcg Intravenous Daily    And    sodium chloride (PF)  5 mL Intravenous Daily    insulin lispro  0-6 Units Subcutaneous TID WC    insulin lispro  0-3 Units Subcutaneous Nightly     Continuous Infusions:    dextrose      dextrose 5 % and 0.9 % NaCl 50 mL/hr at 12/27/17 1700     PRN Meds: glucose, dextrose, glucagon (rDNA), dextrose, phenol, magnesium sulfate, ondansetron, potassium chloride, sodium chloride flush, ipratropium-albuterol, acetaminophen, hydrALAZINE, benzocaine    Data:     Past Medical History:   has a past medical history of Headache(784.0); History of thyroid cancer; Hyperlipidemia; Hypertension; Macular degeneration; Osteoporosis; TIA (transient ischemic attack); and Type II or unspecified type diabetes mellitus without mention of complication, not stated as uncontrolled. Social History:   reports that she has never smoked. She has never used smokeless tobacco. She reports that she does not drink alcohol or use drugs.      Family History:   Family History   Problem Relation Age of Onset    Stroke Mother     Heart Disease Father        Vitals:  BP (!) 155/71   Pulse 79   Temp 98.3 °F (36.8 °C) (Oral)   Resp 18   Ht 5' 1\" (1.549 m)   Wt 155 lb 3.2 oz (70.4 kg)   SpO2 98%   BMI 29.32 kg/m²   Temp (24hrs), Av °F (36.7 °C), Min:97.4 °F (36.3 °C), Max:98.3 °F (36.8 °C)    Recent Labs      17   1933  17   1325  17   1526  17   1931   POCGLU  133*  124*  131*  123*       I/O (24Hr): Intake/Output Summary (Last 24 hours) at 17 0732  Last data filed at 17 0549   Gross per 24 hour   Intake          2555.92 ml   Output              650 ml   Net          1905.92 ml       Labs:    Hematology:  Recent Labs      17   0604  17   0623  17   0622   WBC  6.8  7.4  9.4   HGB  9.4*  9.3*  8.8*   HCT  30.9*  30.1*  28.4*   PLT  252  247  244     Chemistry:  Recent Labs      17   0604  17   0623  17   0622   NA  144  143  145*   K  3.8  3.4*  3.6*   CL  106  107  109*   CO2  24  25  24   GLUCOSE  140*  134*  102*   BUN  28*  20  22   CREATININE  1.51*  1.21*  1.21*   ANIONGAP  14  11  12   LABGLOM  32*  42*  42*   GFRAA  39*  50*  50*   CALCIUM  8.0*  7.9*  7.3*     No results for input(s): PROT, LABALBU, LABA1C, L7VWTPJ, J6PBBOH, FT4, TSH, AST, ALT, LDH, GGT, ALKPHOS, BILITOT, BILIDIR, AMMONIA, AMYLASE, LIPASE, LACTATE, CHOL, HDL, LDLCHOLESTEROL, CHOLHDLRATIO, TRIG, VLDL, PHENYTOIN, PHENYF in the last 72 hours. Lab Results   Component Value Date/Time    SPECIAL NOT REPORTED 2017 05:05 PM     Lab Results   Component Value Date/Time    CULTURE NO SIGNIFICANT GROWTH 2017 05:05 PM    CULTURE  2017 05:05 PM     Performed at [address] [place] [address] [address] [phone]       Lab Results   Component Value Date    PH 6.0 2016    FIO2 NOT REPORTED 2017       Radiology:  KUB  1. Similar degree of small-bowel dilation compatible with at least partial   small bowel obstruction.  Progress imaging as indicated is suggested.    2. Suspected bibasilar atelectatic change.  Could consider dedicated chest radiographs for further evaluation, as indicated. Small bowel follow-through  Slow passage of contrast through the loops of bowel measure up to 4.8 cm. Stool is noted in the colon.  Although findings can be seen with ileus there   are concerning for at least partial bowel obstruction. Progress radiographs   to continue monitoring progression of contrast is recommended   KUB  1. Oral contrast remains within dilated loops of small bowel, measuring up to   3.8 cm in diameter.  No oral contrast reaches the colon.  Findings are   compatible with ileus or small-bowel obstruction.  Continued radiographic   follow-up recommend. 2. Enteric catheter in satisfactory position.          Physical Examination:        General appearance:  alert, cooperative and no distress,  Mental Status:  oriented to person, place and time and normal affect  Lungs:  clear to auscultation bilaterally, normal effort, few basal rales  Heart:  regular rate and rhythm, no murmur  Abdomen:  soft, nontender ,+ bowel sounds, no masses, hepatomegaly, splenomegaly  Extremities:  no edema, redness, tenderness in the calves  Skin:  no gross lesions, rashes, induration    Assessment:        Primary Problem  Small bowel obstruction-Resolved   Terminal ileitis on CT scan - possible infectious vs first episode of IBD  Active Hospital Problems    Diagnosis Date Noted    Elevated liver enzymes [R74.8] 12/25/2017     Priority: High    Small bowel obstruction [K56.609] 12/24/2017     Priority: High    Dehydration [E86.0] 12/23/2017     Priority: High    Acute on chronic renal failure (HCC) [N17.9, N18.9] 11/22/2017     Priority: High    Chronic diastolic heart failure (HCC) [I50.32]      Priority: High    CKD (chronic kidney disease) stage 3, GFR 30-59 ml/min [N18.3] 12/19/2017     Priority: Medium    Severe mitral regurgitation by prior echocardiogram [I34.0]      Priority: Medium    Chronic obstructive pulmonary disease ([facility] [J44.9] 04/06/2017

## 2017-12-29 NOTE — PROGRESS NOTES
antalgic gait, safety and visual deficits; Pt would benefit from continued therapy at discharge to address above noted impairments. Prognosis: Good  Patient Education: Educated on importance of mobility, ambulation safety  REQUIRES PT FOLLOW UP: Yes  Activity Tolerance  Activity Tolerance: Patient Tolerated treatment well  PT Equipment Recommendations  Equipment Needed: No  Other: owns rollator                  Goals  Short term goals  Time Frame for Short term goals: 14 visits  Short term goal 1: Pt to ambulate 300ft modified independent with RW  Short term goal 2: Pt to sit to stand transfer independently  Short term goal 3: Pt to demonstrate independent bed mobility  Short term goal 4: Pt to tolerate 30 minutes of therapy  Short term goal 5: Pt to ascend/descend one step independently  Patient Goals   Patient goals : Pt would like to feel better and get home    Plan    Plan  Times per week: 5-6x  Times per day: Daily  Current Treatment Recommendations: Strengthening, ROM, Balance Training, Functional Mobility Training, Endurance Training, Transfer Training, Gait Training, Stair training, Home Exercise Program, Safety Education & Training, Patient/Caregiver Education & Training  Safety Devices  Type of devices:  All fall risk precautions in place, Call light within reach, Gait belt, Nurse notified (left pt in restroom on toilet with nurse aide present)  Restraints  Initially in place: No     Therapy Time   Individual Concurrent Group Co-treatment   Time In 1100         Time Out 1125         Minutes [address], PTA

## 2017-12-29 NOTE — DISCHARGE SUMMARY
regurgitation by prior echocardiogram [I34.0]         Priority: Medium    Chronic obstructive pulmonary disease ([facility] [J44.9] 04/06/2017       Priority: Medium    Malignant neoplasm of thyroid gland ([facility] Aaron Hester 02/11/2015       Priority: Medium    Hypertension [I10] 08/25/2014       Priority: Medium    Type 2 diabetes with nephropathy ([facility] [E11.21] 08/25/2014       Priority: Medium       Class: Chronic    Debility [R53.81] 04/03/2017    Calculus of kidney [N20.0] 02/11/2015         Plan:         1. Change Cipro and Flagyl to oral  2. Discharge home , okay with GI and surgery  3.  Needs to follow with GI for further evaluation    Significant therapeutic interventions: IV fluids, pain control, milk of molasses enema    Significant Diagnostic Studies:   Labs / Micro:  CBC:   Lab Results   Component Value Date    WBC 9.4 12/29/2017    RBC 3.25 12/29/2017    RBC 0 05/25/2016    HGB 8.8 12/29/2017    HCT 28.4 12/29/2017    MCV 87.4 12/29/2017    MCH 27.1 12/29/2017    MCHC 31.0 12/29/2017    RDW 15.9 12/29/2017     12/29/2017     10/26/2011     BMP:    Lab Results   Component Value Date    GLUCOSE 102 12/29/2017    GLUCOSE 124 05/19/2016     12/29/2017    K 3.6 12/29/2017     12/29/2017    CO2 24 12/29/2017    ANIONGAP 12 12/29/2017    BUN 22 12/29/2017    CREATININE 1.21 12/29/2017    BUNCRER NOT REPORTED 12/29/2017    CALCIUM 7.3 12/29/2017    LABGLOM 42 12/29/2017    GFRAA 50 12/29/2017    GFR      12/29/2017    GFR NOT REPORTED 12/29/2017     HFP:    Lab Results   Component Value Date    PROT 7.0 12/26/2017     CMP:    Lab Results   Component Value Date    GLUCOSE 102 12/29/2017    GLUCOSE 124 05/19/2016     12/29/2017    K 3.6 12/29/2017     12/29/2017    CO2 24 12/29/2017    BUN 22 12/29/2017    CREATININE 1.21 12/29/2017    ANIONGAP 12 12/29/2017    ALKPHOS 254 12/26/2017     12/26/2017    AST 75 12/26/2017    BILITOT 0.27 12/26/2017    BILITOT NEGATIVE 05/25/2016 throughout the abdomen. Focal inflammatory fat stranding is seen at the terminal ileum. There is a diverticulosis of the sigmoid colon without convincing evidence of acute diverticulitis. Appendix is normal. There are atherosclerotic calcifications throughout the aorta. No aneurysms identified. Degenerative spondylosis is demonstrated throughout the spine. No acute osseous abnormality identified. Impression:  1. Focal inflammatory stranding seen surrounding the distal ilium. Correlate clinically for distal ileitis. 2. Multiple prominent loops of small bowel throughout the abdomen possibly represent ileus secondary to ileitis. Early or partial small bowel obstruction is not completely excluded, however no obvious transition point is identified. 3. Distal right ureteral vesicular junction stone without evidence of acute hydronephrosis of the right kidney. 4. Multiple other bilateral nonobstructive renal stones. Electronically Signed By: Quiana Hess   on  12/22/2017  23:33    Xr Chest Standard (2 Vw)    Result Date: 12/15/2017  REPORT: Chest PA and lateral INDICATION: Shortness of breath FINDINGS: Pulmonary vascular congestion is seen throughout both lungs. Trace right and small left pleural effusions. Cardiomegaly. No free intraperitoneal air. Degenerative changes thoracic spine. Final report electronically signed by Kwame Berman on 12/15/2017 3:46 PM    CHF with effusions    Xr Abdomen (kub) (single Ap View)    Result Date: 12/29/2017  EXAMINATION: SUPINE VIEW(S) OF THE ABDOMEN 12/29/2017 6:51 am COMPARISON: Abdominal radiographs from 12/28/2017. HISTORY: ORDERING SYSTEM PROVIDED HISTORY: F/U on SBO TECHNOLOGIST PROVIDED HISTORY: Reason for exam:->F/U on SBO FINDINGS: There is continued progression of contrast the majority of which is now located in the colon with a small amount of enteric contrast within scattered loops of small bowel. Scattered diverticulosis noted.   There are persistent mildly dilated loops of small bowel measuring up to 3.8 cm. The enteric tube has been intervally removed. Visualized lung bases appear well aerated. Visualized osseous structures are moderately demineralized. Continued progression of enteric contrast the majority of which is now within the colon. Persistent mildly dilated loops of small bowel are identified. Overall findings are most suggestive of ileus. Partial small bowel obstruction is not excluded. Progress radiographs could be considered for further evaluation, as indicated. Xr Abdomen (kub) (single Ap View)    Result Date: 12/28/2017  EXAMINATION: SUPINE VIEW(S) OF THE ABDOMEN 12/28/2017 7:52 am COMPARISON: Abdominal radiograph 12/17/2017. Small-bowel follow-through from 12/26/2017 HISTORY: ORDERING SYSTEM PROVIDED HISTORY: SBO TECHNOLOGIST PROVIDED HISTORY: Reason for exam:->SBO FINDINGS: Enteric tube extends to the stomach. There is continued progression of contrast, now extending to the transverse colon. Air is seen in the distal colon/ rectum. There is persistent mild prominence of several small bowel loops measuring up to 3.9 cm. Visualized lung bases appear well aerated. Visualized osseous structures appear mildly demineralized. Continued progression of contrast now seen to the level of the transverse colon with persistent mildly dilated loops of small bowel measure up to 3.9 cm. Findings can be seen with ileus/partial obstruction. Progress radiographs may be of further diagnostic aid, as indicated. Xr Abdomen (kub) (single Ap View)    Result Date: 12/27/2017  EXAMINATION: SUPINE VIEW(S) OF THE ABDOMEN 12/27/2017 4:22 am COMPARISON: Small bowel follow-through 12/26/2017, abdominal radiographs 12/26/2017 HISTORY: ORDERING SYSTEM PROVIDED HISTORY: SBO TECHNOLOGIST PROVIDED HISTORY: Reason for exam:->SBO FINDINGS: Enteric catheter is present with side port below the GE junction.   There remains well contours within multiple small bowel loops which are dilated, measuring up to 3.8 cm in diameter. Oral contrast has not reached the colon. Small left pleural effusion and left basilar atelectasis. Thoracic aortic atherosclerotic disease. 1. Oral contrast remains within dilated loops of small bowel, measuring up to 3.8 cm in diameter. No oral contrast reaches the colon. Findings are compatible with ileus or small-bowel obstruction. Continued radiographic follow-up recommend. 2. Enteric catheter in satisfactory position. Xr Abdomen (kub) (single Ap View)    Result Date: 12/26/2017  EXAMINATION: SUPINE VIEW(S) OF THE ABDOMEN 12/26/2017 7:03 am COMPARISON: Abdominal radiographs from 12/25/2017. CT abdomen/pelvis from 12/24/2017. HISTORY: ORDERING SYSTEM PROVIDED HISTORY: comparison TECHNOLOGIST PROVIDED HISTORY: Reason for exam:->comparison Small-bowel obstruction seen on a prior imaging studies. FINDINGS: Enteric tube extends to the stomach. There are persistent dilated loops of small bowel measure up to 4.0 cm. There is a relative paucity of colonic gas. Some stool is noted in the colon. No abnormal calcifications project over the visualized abdomen. Streaky bibasilar densities in the lung bases may reflect atelectasis. There are similar moderate hypertrophic degenerative changes of the visualized spine and hips. Visualized osseous structures appear mildly demineralized, but grossly intact, given the non dedicated imaging. 1. Similar degree of small-bowel dilation compatible with at least partial small bowel obstruction. Progress imaging as indicated is suggested. 2. Suspected bibasilar atelectatic change. Could consider dedicated chest radiographs for further evaluation, as indicated. Xr Abdomen (kub) (single Ap View)    Result Date: 12/25/2017  EXAMINATION: SUPINE VIEW(S) OF THE ABDOMEN 12/25/2017 9:20 am COMPARISON: CT abdomen pelvis from 12/24/2017 HISTORY: ORDERING SYSTEM PROVIDED HISTORY: s/p NGT placement. SBO on CT scan.

## 2017-12-29 NOTE — PLAN OF CARE
Melany Pabon 19    Second Visit Note  For more detailed information please refer to the progress note of the day      12/29/2017    3:54 PM    Name:   Buck Camarena  MRN:     [id number]     [place]:      [de-identified]   Room:   [address] [place] [address] Day:  4  Admit Date:  12/25/2017 12:01 AM    PCP:   Lane Son MD  Code Status:  DNR-CCA        Pt vitals were reviewed   New labs were reviewed   Patient was seen    Updated plan :     1. Family had multiple questions regarding the disease process and diet-discussed  2. Daughter wants some prescriptions for a few days since other medicines are mail orders-prescription written  3.  She will be going home today, GI is okay with discharge        Sammy Martinez MD  12/29/2017  3:54 PM

## 2017-12-30 ENCOUNTER — CARE COORDINATION (OUTPATIENT)
Dept: CASE MANAGEMENT | Age: 82
End: 2017-12-30

## 2017-12-30 NOTE — FLOWSHEET NOTE
Discharge instructions reviewed with pt/family, discussed medications, VU, denies any further questions or anxiety related to discharge. IV/tele discontinued. Pt discharged to home with family Taken from room via wheelchair by family, personal belongings taken with.

## 2017-12-31 NOTE — CARE COORDINATION
[facility] Transitions Initial Follow Up Call    Call within 2 business days of discharge: Yes    Patient: Merry Armstrong Patient : 1925   MRN: <[id number]>  Reason for Admission: There are no discharge diagnoses documented for the most recent discharge.   Discharge Date: 17 RARS: [facility] Risk Score: 23.5     Second attempt- unable to reach patient, left vm message on daughter's phone with name and call back number//ju    Facility: [facility]    Non-face-to-face services provided:  Communication with home health agencies or other community services the patient is currently using-received message from [facility] that they would be starting care on 17 per patient's request    Inpatient Assessment  Care Transitions 24 Hour Call    Do you have all of your prescriptions and are they filled?:  Yes  Patient DME:  Inis Van Buren, Wheelchair, Straight cane  Do you have support at home?:  Alone  Are you an active caregiver in your home?:  No  Care Transitions Interventions         Follow Up  Future Appointments  Date Time Provider Jesus Bhagat   2018 11:15 AM MD Anette Barnes [address]   2018 1:00 PM MD Anette Barnes [address]   2018 1:30 PM Juana Garcia MD [facility] [address],[facility], RN

## 2018-01-02 NOTE — CARE COORDINATION
[facility] Transitions Initial Follow Up Call    Call within 2 business days of discharge: Yes    Patient: Lidia Murrieta Patient : 1925   MRN: <[id number]>    Reason for Admission: Small Bowel Obstruction  Discharge Date: 17   RARS: [facility] Risk Score: 23.5, CM 10     Spoke with: Patient    Spoke with patient who states she is feeling better but \"I am going slow\". Writer reviewed role of CTC following discharge- v/u  Declined to review medications over the phone (she is legally blind and requires family assistance with her medications.    Encouraged call if questions or concerns- v/u   Writer call home care- confirmed appointment to see patient today  Attempt call to pcp- answering service answered- will call back later to have existing appt changed to transitional .     Facility: [facility]    Non-face-to-face services provided:  Scheduled appointment with PCP-called to attempt to change appt to transitional  Obtained and reviewed discharge summary and/or continuity of care documents    Inpatient Assessment  Care Transitions 24 Hour Call    Do you have any ongoing symptoms?:  No  Do you have a copy of your discharge instructions?:  Yes  Do you have all of your prescriptions and are they filled?:  Yes  Have you been contacted by a [facility] Pharmacist?:  No  Have you scheduled your follow up appointment?:  Yes  How are you going to get to your appointment?:  Car - family or friend to transport  Were you discharged with any Home Care or Post Acute Services:  Yes  Post Acute Services:  Home Health  Patient DME:  Claire Common, Wheelchair, Straight cane  Do you have support at home?:  Alone  Do you feel like you have everything you need to keep you well at home?:  Yes  Are you an active caregiver in your home?:  No  Care Transitions Interventions         Follow Up- 18- Dr Geri Rowley ([phone]) and Dr Elvira Valles ([phone])  Future Appointments  Date Time Provider Jesus Bhagat   2018 10:00 AM Vinicio

## 2018-01-05 ENCOUNTER — CARE COORDINATION (OUTPATIENT)
Dept: CASE MANAGEMENT | Age: 83
End: 2018-01-05

## 2018-01-05 NOTE — CARE COORDINATION
[facility] Transitions Follow Up Call    2018    Patient: Val Valentine  Patient : 1925   MRN: [id number]    Reason for Admission: abdominal pain/ distention- SBO  Discharge Date: 17   RARS: Risk Score: 23.5, CM 10    Spoke with: Geneva Ugarte with patient who states she is feeling well- states normal bowel movements and feels good. Asks if writer has heard anything about her missing denture partials (upper)- she states her daughter had been in contact with someone from [facility] but has not heard back. Writer to call security and pursue this further for patient. Writer call Rosamaria Way who states he does not have any dentures. Suggests writer contact Joon Jasso (patient representative Lo Guy) [phone]  [facility] with Gregoria Flores requesting call back with any information about patient dentures. Melquiades Cruz will follow up on this 18    Care Transitions Subsequent and Final Call    Subsequent and Final Calls  Do you have any ongoing symptoms?:  No  Have your medications changed?:  No  Do you have any questions related to your medications?:  No  Do you currently have any active services?:  No  Are you currently active with any services?:  Home Health  Do you have any needs or concerns that I can assist you with?:  No  Identified Barriers:  Lack of Education  Care Transitions Interventions  Other Interventions:             Follow Up  Future Appointments  Date Time Provider Jesus Bhagat   2018 10:00 AM Glo Westbrook MD [facility]   2018 11:15 AM MD Tu Vora   2018 1:00 PM MD Tu Vora   2018 1:30 PM Zoey Rangel MD [place]       Elsie Hernandez RN

## 2018-01-09 ENCOUNTER — CARE COORDINATION (OUTPATIENT)
Dept: CASE MANAGEMENT | Age: 83
End: 2018-01-09

## 2018-01-12 ENCOUNTER — HOSPITAL ENCOUNTER (OUTPATIENT)
Age: 83
Setting detail: SPECIMEN
Discharge: HOME | End: 2018-01-12
Payer: MEDICARE

## 2018-01-12 LAB
ANION GAP SERPL CALCULATED.3IONS-SCNC: 10 MMOL/L (ref 9–17)
BUN BLDV-MCNC: 31 MG/DL (ref 8–23)
BUN/CREAT BLD: 23 (ref 9–20)
CALCIUM SERPL-MCNC: 7.8 MG/DL (ref 8.6–10.4)
CHLORIDE BLD-SCNC: 98 MMOL/L (ref 98–107)
CO2: 28 MMOL/L (ref 20–31)
CREAT SERPL-MCNC: 1.36 MG/DL (ref 0.5–0.9)
GFR AFRICAN AMERICAN: 44 ML/MIN
GFR NON-AFRICAN AMERICAN: 36 ML/MIN
GFR SERPL CREATININE-BSD FRML MDRD: ABNORMAL ML/MIN/{1.73_M2}
GFR SERPL CREATININE-BSD FRML MDRD: ABNORMAL ML/MIN/{1.73_M2}
GLUCOSE BLD-MCNC: 118 MG/DL (ref 70–99)
POTASSIUM SERPL-SCNC: 5.2 MMOL/L (ref 3.7–5.3)
SODIUM BLD-SCNC: 136 MMOL/L (ref 135–144)

## 2018-01-12 PROCEDURE — 36415 COLL VENOUS BLD VENIPUNCTURE: CPT

## 2018-01-12 PROCEDURE — 80048 BASIC METABOLIC PNL TOTAL CA: CPT

## 2018-01-22 ENCOUNTER — HOSPITAL ENCOUNTER (OUTPATIENT)
Age: 83
Discharge: HOME | End: 2018-01-22
Payer: MEDICARE

## 2018-01-22 DIAGNOSIS — E87.5 ACUTE HYPERKALEMIA: ICD-10-CM

## 2018-01-22 LAB
ANION GAP SERPL CALCULATED.3IONS-SCNC: 16 MMOL/L (ref 9–17)
BUN BLDV-MCNC: 37 MG/DL (ref 8–23)
BUN/CREAT BLD: 23 (ref 9–20)
CALCIUM SERPL-MCNC: 8.4 MG/DL (ref 8.6–10.4)
CHLORIDE BLD-SCNC: 97 MMOL/L (ref 98–107)
CO2: 25 MMOL/L (ref 20–31)
CREAT SERPL-MCNC: 1.59 MG/DL (ref 0.5–0.9)
GFR AFRICAN AMERICAN: 37 ML/MIN
GFR NON-AFRICAN AMERICAN: 30 ML/MIN
GFR SERPL CREATININE-BSD FRML MDRD: ABNORMAL ML/MIN/{1.73_M2}
GFR SERPL CREATININE-BSD FRML MDRD: ABNORMAL ML/MIN/{1.73_M2}
GLUCOSE BLD-MCNC: 153 MG/DL (ref 70–99)
POTASSIUM SERPL-SCNC: 4.2 MMOL/L (ref 3.7–5.3)
SODIUM BLD-SCNC: 138 MMOL/L (ref 135–144)

## 2018-01-22 PROCEDURE — 80048 BASIC METABOLIC PNL TOTAL CA: CPT

## 2018-01-22 PROCEDURE — 36415 COLL VENOUS BLD VENIPUNCTURE: CPT

## 2018-02-05 ENCOUNTER — HOSPITAL ENCOUNTER (OUTPATIENT)
Dept: LAB | Age: 83
Discharge: HOME | End: 2018-02-05
Payer: MEDICARE

## 2018-02-05 DIAGNOSIS — E87.5 ACUTE HYPERKALEMIA: ICD-10-CM

## 2018-02-05 LAB
ANION GAP SERPL CALCULATED.3IONS-SCNC: 12 MMOL/L (ref 9–17)
BUN BLDV-MCNC: 36 MG/DL (ref 8–23)
BUN/CREAT BLD: 25 (ref 9–20)
CALCIUM SERPL-MCNC: 9 MG/DL (ref 8.6–10.4)
CHLORIDE BLD-SCNC: 99 MMOL/L (ref 98–107)
CO2: 26 MMOL/L (ref 20–31)
CREAT SERPL-MCNC: 1.44 MG/DL (ref 0.5–0.9)
GFR AFRICAN AMERICAN: 41 ML/MIN
GFR NON-AFRICAN AMERICAN: 34 ML/MIN
GFR SERPL CREATININE-BSD FRML MDRD: ABNORMAL ML/MIN/{1.73_M2}
GFR SERPL CREATININE-BSD FRML MDRD: ABNORMAL ML/MIN/{1.73_M2}
GLUCOSE BLD-MCNC: 227 MG/DL (ref 70–99)
POTASSIUM SERPL-SCNC: 4.3 MMOL/L (ref 3.7–5.3)
SODIUM BLD-SCNC: 137 MMOL/L (ref 135–144)

## 2018-02-05 PROCEDURE — 80048 BASIC METABOLIC PNL TOTAL CA: CPT

## 2018-02-05 PROCEDURE — 36415 COLL VENOUS BLD VENIPUNCTURE: CPT

## 2018-02-26 ENCOUNTER — HOSPITAL ENCOUNTER (OUTPATIENT)
Age: 83
Discharge: HOME | End: 2018-02-26
Payer: MEDICARE

## 2018-02-26 DIAGNOSIS — E78.5 HYPERLIPIDEMIA, UNSPECIFIED HYPERLIPIDEMIA TYPE: ICD-10-CM

## 2018-02-26 DIAGNOSIS — E11.21 TYPE 2 DIABETES WITH NEPHROPATHY (HCC): ICD-10-CM

## 2018-02-26 LAB
ABSOLUTE EOS #: 0.3 K/UL (ref 0–0.4)
ABSOLUTE IMMATURE GRANULOCYTE: ABNORMAL K/UL (ref 0–0.3)
ABSOLUTE LYMPH #: 1.5 K/UL (ref 1–4.8)
ABSOLUTE MONO #: 0.2 K/UL (ref 0–1)
ALBUMIN SERPL-MCNC: 3.9 G/DL (ref 3.5–5.2)
ALBUMIN/GLOBULIN RATIO: 0.9 (ref 1–2.5)
ALP BLD-CCNC: 90 U/L (ref 35–104)
ALT SERPL-CCNC: 6 U/L (ref 5–33)
ANION GAP SERPL CALCULATED.3IONS-SCNC: 14 MMOL/L (ref 9–17)
AST SERPL-CCNC: 13 U/L
BASOPHILS # BLD: 1 % (ref 0–2)
BASOPHILS ABSOLUTE: 0 K/UL (ref 0–0.2)
BILIRUB SERPL-MCNC: 0.27 MG/DL (ref 0.3–1.2)
BUN BLDV-MCNC: 36 MG/DL (ref 8–23)
BUN/CREAT BLD: 25 (ref 9–20)
CALCIUM SERPL-MCNC: 9.3 MG/DL (ref 8.6–10.4)
CHLORIDE BLD-SCNC: 97 MMOL/L (ref 98–107)
CO2: 27 MMOL/L (ref 20–31)
CREAT SERPL-MCNC: 1.46 MG/DL (ref 0.5–0.9)
DIFFERENTIAL TYPE: ABNORMAL
EOSINOPHILS RELATIVE PERCENT: 5 % (ref 0–8)
ESTIMATED AVERAGE GLUCOSE: 143 MG/DL
GFR AFRICAN AMERICAN: 41 ML/MIN
GFR NON-AFRICAN AMERICAN: 34 ML/MIN
GFR SERPL CREATININE-BSD FRML MDRD: ABNORMAL ML/MIN/{1.73_M2}
GFR SERPL CREATININE-BSD FRML MDRD: ABNORMAL ML/MIN/{1.73_M2}
GLUCOSE BLD-MCNC: 129 MG/DL (ref 70–99)
HBA1C MFR BLD: 6.6 % (ref 4.8–5.9)
HCT VFR BLD CALC: 33.1 % (ref 36–46)
HEMOGLOBIN: 10.4 G/DL (ref 12–16)
IMMATURE GRANULOCYTES: ABNORMAL %
LYMPHOCYTES # BLD: 27 % (ref 24–44)
MCH RBC QN AUTO: 26.3 PG (ref 26–34)
MCHC RBC AUTO-ENTMCNC: 31.6 G/DL (ref 31–37)
MCV RBC AUTO: 83.2 FL (ref 80–100)
MONOCYTES # BLD: 4 % (ref 0–12)
NRBC AUTOMATED: ABNORMAL PER 100 WBC
PDW BLD-RTO: 17.5 % (ref 12.1–15.2)
PLATELET # BLD: 237 K/UL (ref 140–450)
PLATELET ESTIMATE: ABNORMAL
PMV BLD AUTO: 9.6 FL (ref 6–12)
POTASSIUM SERPL-SCNC: 3.9 MMOL/L (ref 3.7–5.3)
RBC # BLD: 3.98 M/UL (ref 4–5.2)
RBC # BLD: ABNORMAL 10*6/UL
SEG NEUTROPHILS: 63 % (ref 36–66)
SEGMENTED NEUTROPHILS ABSOLUTE COUNT: 3.7 K/UL (ref 1.8–7.7)
SODIUM BLD-SCNC: 138 MMOL/L (ref 135–144)
TOTAL PROTEIN: 8.3 G/DL (ref 6.4–8.3)
WBC # BLD: 5.8 K/UL (ref 3.5–11)
WBC # BLD: ABNORMAL 10*3/UL

## 2018-02-26 PROCEDURE — 83036 HEMOGLOBIN GLYCOSYLATED A1C: CPT

## 2018-02-26 PROCEDURE — 36415 COLL VENOUS BLD VENIPUNCTURE: CPT

## 2018-02-26 PROCEDURE — 80053 COMPREHEN METABOLIC PANEL: CPT

## 2018-02-26 PROCEDURE — 85025 COMPLETE CBC W/AUTO DIFF WBC: CPT

## 2018-03-28 ENCOUNTER — HOSPITAL ENCOUNTER (OUTPATIENT)
Age: 83
Discharge: HOME | End: 2018-03-28
Payer: MEDICARE

## 2018-03-28 DIAGNOSIS — E78.5 HYPERLIPIDEMIA, UNSPECIFIED HYPERLIPIDEMIA TYPE: ICD-10-CM

## 2018-03-28 DIAGNOSIS — E87.5 HYPERKALEMIA: ICD-10-CM

## 2018-03-28 LAB
ANION GAP SERPL CALCULATED.3IONS-SCNC: 13 MMOL/L (ref 9–17)
BUN BLDV-MCNC: 36 MG/DL (ref 8–23)
BUN/CREAT BLD: 23 (ref 9–20)
CALCIUM SERPL-MCNC: 9.2 MG/DL (ref 8.6–10.4)
CHLORIDE BLD-SCNC: 97 MMOL/L (ref 98–107)
CHOLESTEROL, FASTING: 132 MG/DL
CHOLESTEROL/HDL RATIO: 3.1
CO2: 28 MMOL/L (ref 20–31)
CREAT SERPL-MCNC: 1.6 MG/DL (ref 0.5–0.9)
GFR AFRICAN AMERICAN: 37 ML/MIN
GFR NON-AFRICAN AMERICAN: 30 ML/MIN
GFR SERPL CREATININE-BSD FRML MDRD: ABNORMAL ML/MIN/{1.73_M2}
GFR SERPL CREATININE-BSD FRML MDRD: ABNORMAL ML/MIN/{1.73_M2}
GLUCOSE BLD-MCNC: 130 MG/DL (ref 70–99)
HDLC SERPL-MCNC: 42 MG/DL
LDL CHOLESTEROL: 66 MG/DL (ref 0–130)
POTASSIUM SERPL-SCNC: 3.9 MMOL/L (ref 3.7–5.3)
SODIUM BLD-SCNC: 138 MMOL/L (ref 135–144)
TRIGLYCERIDE, FASTING: 122 MG/DL
VLDLC SERPL CALC-MCNC: NORMAL MG/DL (ref 1–30)

## 2018-03-28 PROCEDURE — 36415 COLL VENOUS BLD VENIPUNCTURE: CPT

## 2018-03-28 PROCEDURE — 80061 LIPID PANEL: CPT

## 2018-03-28 PROCEDURE — 80048 BASIC METABOLIC PNL TOTAL CA: CPT

## 2018-04-11 PROBLEM — E86.0 DEHYDRATION: Status: RESOLVED | Noted: 2017-12-23 | Resolved: 2018-04-11

## 2018-05-02 ENCOUNTER — HOSPITAL ENCOUNTER (OUTPATIENT)
Age: 83
Discharge: HOME | End: 2018-05-02
Payer: MEDICARE

## 2018-05-02 DIAGNOSIS — I10 ESSENTIAL HYPERTENSION: ICD-10-CM

## 2018-05-02 DIAGNOSIS — N19 RENAL FAILURE, UNSPECIFIED CHRONICITY: ICD-10-CM

## 2018-05-02 DIAGNOSIS — E11.21 TYPE 2 DIABETES WITH NEPHROPATHY (HCC): ICD-10-CM

## 2018-05-02 LAB
ANION GAP SERPL CALCULATED.3IONS-SCNC: 15 MMOL/L (ref 9–17)
BUN BLDV-MCNC: 41 MG/DL (ref 8–23)
BUN/CREAT BLD: 26 (ref 9–20)
CALCIUM SERPL-MCNC: 8.9 MG/DL (ref 8.6–10.4)
CHLORIDE BLD-SCNC: 96 MMOL/L (ref 98–107)
CO2: 26 MMOL/L (ref 20–31)
CREAT SERPL-MCNC: 1.59 MG/DL (ref 0.5–0.9)
GFR AFRICAN AMERICAN: 37 ML/MIN
GFR NON-AFRICAN AMERICAN: 30 ML/MIN
GFR SERPL CREATININE-BSD FRML MDRD: ABNORMAL ML/MIN/{1.73_M2}
GFR SERPL CREATININE-BSD FRML MDRD: ABNORMAL ML/MIN/{1.73_M2}
GLUCOSE BLD-MCNC: 296 MG/DL (ref 70–99)
POTASSIUM SERPL-SCNC: 4 MMOL/L (ref 3.7–5.3)
SODIUM BLD-SCNC: 137 MMOL/L (ref 135–144)

## 2018-05-02 PROCEDURE — 36415 COLL VENOUS BLD VENIPUNCTURE: CPT

## 2018-05-02 PROCEDURE — 80048 BASIC METABOLIC PNL TOTAL CA: CPT

## 2018-05-14 ENCOUNTER — HOSPITAL ENCOUNTER (OUTPATIENT)
Age: 83
Discharge: HOME | End: 2018-05-14
Payer: MEDICARE

## 2018-05-14 LAB
T4 TOTAL: 9 UG/DL (ref 4.5–12)
TSH SERPL DL<=0.05 MIU/L-ACNC: 3.35 MIU/L (ref 0.3–5)

## 2018-05-14 PROCEDURE — 84436 ASSAY OF TOTAL THYROXINE: CPT

## 2018-05-14 PROCEDURE — 86800 THYROGLOBULIN ANTIBODY: CPT

## 2018-05-14 PROCEDURE — 84443 ASSAY THYROID STIM HORMONE: CPT

## 2018-05-14 PROCEDURE — 36415 COLL VENOUS BLD VENIPUNCTURE: CPT

## 2018-05-14 PROCEDURE — 84432 ASSAY OF THYROGLOBULIN: CPT

## 2018-05-16 LAB
THYROGLOBULIN AB: <20 IU/ML (ref 0–40)
THYROGLOBULIN: <0.2 NG/ML (ref 0–63.4)

## 2018-10-01 ENCOUNTER — HOSPITAL ENCOUNTER (OUTPATIENT)
Age: 83
Discharge: HOME | End: 2018-10-01
Payer: MEDICARE

## 2018-10-01 DIAGNOSIS — E11.21 TYPE 2 DIABETES WITH NEPHROPATHY (HCC): ICD-10-CM

## 2018-10-01 LAB
-: ABNORMAL
AMORPHOUS: ABNORMAL
BACTERIA: ABNORMAL
BILIRUBIN URINE: NEGATIVE
CASTS UA: ABNORMAL /LPF
COLOR: YELLOW
COMMENT UA: ABNORMAL
CREATININE URINE: 120.4 MG/DL (ref 28–217)
CRYSTALS, UA: ABNORMAL /HPF
EPITHELIAL CELLS UA: ABNORMAL /HPF (ref 0–25)
GLUCOSE URINE: NEGATIVE
KETONES, URINE: NEGATIVE
LEUKOCYTE ESTERASE, URINE: NEGATIVE
MICROALBUMIN/CREAT 24H UR: 112 MG/L
MICROALBUMIN/CREAT UR-RTO: 93 MCG/MG CREAT
MUCUS: ABNORMAL
NITRITE, URINE: NEGATIVE
OTHER OBSERVATIONS UA: ABNORMAL
PH UA: 6 (ref 5–9)
PROTEIN UA: ABNORMAL
RBC UA: ABNORMAL /HPF (ref 0–2)
RENAL EPITHELIAL, UA: ABNORMAL /HPF
SPECIFIC GRAVITY UA: 1.02 (ref 1.01–1.02)
TRICHOMONAS: ABNORMAL
TURBIDITY: CLEAR
URINE HGB: NEGATIVE
UROBILINOGEN, URINE: NORMAL
WBC UA: ABNORMAL /HPF (ref 0–5)
YEAST: ABNORMAL

## 2018-10-01 PROCEDURE — 81001 URINALYSIS AUTO W/SCOPE: CPT

## 2018-10-01 PROCEDURE — 82570 ASSAY OF URINE CREATININE: CPT

## 2018-10-01 PROCEDURE — 82043 UR ALBUMIN QUANTITATIVE: CPT

## 2019-01-22 ENCOUNTER — HOSPITAL ENCOUNTER (OUTPATIENT)
Age: 84
Discharge: HOME | End: 2019-01-22
Payer: MEDICARE

## 2019-01-22 DIAGNOSIS — E11.21 TYPE 2 DIABETES WITH NEPHROPATHY (HCC): ICD-10-CM

## 2019-01-22 DIAGNOSIS — I50.32 CHRONIC DIASTOLIC HEART FAILURE (HCC): ICD-10-CM

## 2019-01-22 DIAGNOSIS — E03.9 ACQUIRED HYPOTHYROIDISM: ICD-10-CM

## 2019-01-22 LAB
ABSOLUTE EOS #: 0.26 K/UL (ref 0–0.44)
ABSOLUTE IMMATURE GRANULOCYTE: <0.03 K/UL (ref 0–0.3)
ABSOLUTE LYMPH #: 1.2 K/UL (ref 1.1–3.7)
ABSOLUTE MONO #: 0.37 K/UL (ref 0.1–1.2)
ALBUMIN SERPL-MCNC: 3.5 G/DL (ref 3.5–5.2)
ALBUMIN/GLOBULIN RATIO: 0.8 (ref 1–2.5)
ALP BLD-CCNC: 95 U/L (ref 35–104)
ALT SERPL-CCNC: 7 U/L (ref 5–33)
ANION GAP SERPL CALCULATED.3IONS-SCNC: 16 MMOL/L (ref 9–17)
AST SERPL-CCNC: 13 U/L
BASOPHILS # BLD: 0 % (ref 0–2)
BASOPHILS ABSOLUTE: 0.03 K/UL (ref 0–0.2)
BILIRUB SERPL-MCNC: 0.31 MG/DL (ref 0.3–1.2)
BNP INTERPRETATION: ABNORMAL
BUN BLDV-MCNC: 35 MG/DL (ref 8–23)
BUN/CREAT BLD: 19 (ref 9–20)
C-REACTIVE PROTEIN: 17.9 MG/L (ref 0–5)
CALCIUM SERPL-MCNC: 8.9 MG/DL (ref 8.6–10.4)
CHLORIDE BLD-SCNC: 94 MMOL/L (ref 98–107)
CO2: 27 MMOL/L (ref 20–31)
CREAT SERPL-MCNC: 1.89 MG/DL (ref 0.5–0.9)
DIFFERENTIAL TYPE: ABNORMAL
EOSINOPHILS RELATIVE PERCENT: 3 % (ref 1–4)
GFR AFRICAN AMERICAN: 30 ML/MIN
GFR NON-AFRICAN AMERICAN: 25 ML/MIN
GFR SERPL CREATININE-BSD FRML MDRD: ABNORMAL ML/MIN/{1.73_M2}
GFR SERPL CREATININE-BSD FRML MDRD: ABNORMAL ML/MIN/{1.73_M2}
GLUCOSE BLD-MCNC: 221 MG/DL (ref 70–99)
HCT VFR BLD CALC: 36.1 % (ref 36.3–47.1)
HEMOGLOBIN: 10.9 G/DL (ref 11.9–15.1)
IMMATURE GRANULOCYTES: 0 %
LYMPHOCYTES # BLD: 15 % (ref 24–43)
MCH RBC QN AUTO: 26 PG (ref 25.2–33.5)
MCHC RBC AUTO-ENTMCNC: 30.2 G/DL (ref 28.4–34.8)
MCV RBC AUTO: 86.2 FL (ref 82.6–102.9)
MONOCYTES # BLD: 5 % (ref 3–12)
NRBC AUTOMATED: 0 PER 100 WBC
PDW BLD-RTO: 17 % (ref 11.8–14.4)
PLATELET # BLD: 254 K/UL (ref 138–453)
PLATELET ESTIMATE: ABNORMAL
PMV BLD AUTO: 11.3 FL (ref 8.1–13.5)
POTASSIUM SERPL-SCNC: 3.7 MMOL/L (ref 3.7–5.3)
PRO-BNP: ABNORMAL PG/ML
RBC # BLD: 4.19 M/UL (ref 3.95–5.11)
RBC # BLD: ABNORMAL 10*6/UL
SEDIMENTATION RATE, ERYTHROCYTE: 62 MM (ref 0–20)
SEG NEUTROPHILS: 77 % (ref 36–65)
SEGMENTED NEUTROPHILS ABSOLUTE COUNT: 6.1 K/UL (ref 1.5–8.1)
SODIUM BLD-SCNC: 137 MMOL/L (ref 135–144)
TOTAL PROTEIN: 8 G/DL (ref 6.4–8.3)
TSH SERPL DL<=0.05 MIU/L-ACNC: 2.56 MIU/L (ref 0.3–5)
WBC # BLD: 8 K/UL (ref 3.5–11.3)
WBC # BLD: ABNORMAL 10*3/UL

## 2019-01-22 PROCEDURE — 36415 COLL VENOUS BLD VENIPUNCTURE: CPT

## 2019-01-22 PROCEDURE — 85651 RBC SED RATE NONAUTOMATED: CPT

## 2019-01-22 PROCEDURE — 80053 COMPREHEN METABOLIC PANEL: CPT

## 2019-01-22 PROCEDURE — 84443 ASSAY THYROID STIM HORMONE: CPT

## 2019-01-22 PROCEDURE — 85025 COMPLETE CBC W/AUTO DIFF WBC: CPT

## 2019-01-22 PROCEDURE — 86140 C-REACTIVE PROTEIN: CPT

## 2019-01-22 PROCEDURE — 83880 ASSAY OF NATRIURETIC PEPTIDE: CPT

## 2019-02-01 ENCOUNTER — APPOINTMENT (OUTPATIENT)
Dept: NON INVASIVE DIAGNOSTICS | Age: 84
DRG: 291 | End: 2019-02-01
Attending: INTERNAL MEDICINE
Payer: MEDICARE

## 2019-02-01 ENCOUNTER — HOSPITAL ENCOUNTER (INPATIENT)
Age: 84
LOS: 5 days | Discharge: HOSPICE | DRG: 291 | End: 2019-02-06
Attending: INTERNAL MEDICINE | Admitting: INTERNAL MEDICINE
Payer: MEDICARE

## 2019-02-01 ENCOUNTER — APPOINTMENT (OUTPATIENT)
Dept: GENERAL RADIOLOGY | Age: 84
DRG: 291 | End: 2019-02-01
Attending: INTERNAL MEDICINE
Payer: MEDICARE

## 2019-02-01 PROBLEM — I50.43 HEART FAILURE, ACUTE ON CHRONIC, SYSTOLIC AND DIASTOLIC (HCC): Status: ACTIVE | Noted: 2019-02-01

## 2019-02-01 LAB
-: ABNORMAL
ABSOLUTE EOS #: 0.26 K/UL (ref 0–0.44)
ABSOLUTE IMMATURE GRANULOCYTE: <0.03 K/UL (ref 0–0.3)
ABSOLUTE LYMPH #: 1.21 K/UL (ref 1.1–3.7)
ABSOLUTE MONO #: 0.43 K/UL (ref 0.1–1.2)
ALBUMIN SERPL-MCNC: 3.3 G/DL (ref 3.5–5.2)
ALBUMIN/GLOBULIN RATIO: 0.8 (ref 1–2.5)
ALP BLD-CCNC: 91 U/L (ref 35–104)
ALT SERPL-CCNC: 7 U/L (ref 5–33)
AMORPHOUS: ABNORMAL
ANION GAP SERPL CALCULATED.3IONS-SCNC: 14 MMOL/L (ref 9–17)
AST SERPL-CCNC: 14 U/L
BACTERIA: ABNORMAL
BASOPHILS # BLD: 0 % (ref 0–2)
BASOPHILS ABSOLUTE: 0.03 K/UL (ref 0–0.2)
BILIRUB SERPL-MCNC: 0.25 MG/DL (ref 0.3–1.2)
BILIRUBIN URINE: NEGATIVE
BNP INTERPRETATION: ABNORMAL
BUN BLDV-MCNC: 61 MG/DL (ref 8–23)
BUN/CREAT BLD: 22 (ref 9–20)
CALCIUM SERPL-MCNC: 8.9 MG/DL (ref 8.6–10.4)
CASTS UA: ABNORMAL /LPF
CHLORIDE BLD-SCNC: 95 MMOL/L (ref 98–107)
CO2: 28 MMOL/L (ref 20–31)
COLOR: YELLOW
COMMENT UA: ABNORMAL
CREAT SERPL-MCNC: 2.82 MG/DL (ref 0.5–0.9)
CRYSTALS, UA: ABNORMAL /HPF
DIFFERENTIAL TYPE: ABNORMAL
EKG ATRIAL RATE: 58 BPM
EKG P AXIS: 37 DEGREES
EKG P-R INTERVAL: 154 MS
EKG Q-T INTERVAL: 448 MS
EKG QRS DURATION: 92 MS
EKG QTC CALCULATION (BAZETT): 439 MS
EKG R AXIS: 67 DEGREES
EKG T AXIS: -153 DEGREES
EKG VENTRICULAR RATE: 58 BPM
EOSINOPHILS RELATIVE PERCENT: 3 % (ref 1–4)
EPITHELIAL CELLS UA: ABNORMAL /HPF (ref 0–25)
GFR AFRICAN AMERICAN: 19 ML/MIN
GFR NON-AFRICAN AMERICAN: 16 ML/MIN
GFR SERPL CREATININE-BSD FRML MDRD: ABNORMAL ML/MIN/{1.73_M2}
GFR SERPL CREATININE-BSD FRML MDRD: ABNORMAL ML/MIN/{1.73_M2}
GLUCOSE BLD-MCNC: 180 MG/DL (ref 74–100)
GLUCOSE BLD-MCNC: 189 MG/DL (ref 70–99)
GLUCOSE URINE: NEGATIVE
HCT VFR BLD CALC: 31.7 % (ref 36.3–47.1)
HEMOGLOBIN: 9.6 G/DL (ref 11.9–15.1)
IMMATURE GRANULOCYTES: 0 %
KETONES, URINE: NEGATIVE
LEUKOCYTE ESTERASE, URINE: ABNORMAL
LV EF: 38 %
LVEF MODALITY: NORMAL
LYMPHOCYTES # BLD: 15 % (ref 24–43)
MCH RBC QN AUTO: 26.2 PG (ref 25.2–33.5)
MCHC RBC AUTO-ENTMCNC: 30.3 G/DL (ref 28.4–34.8)
MCV RBC AUTO: 86.6 FL (ref 82.6–102.9)
MONOCYTES # BLD: 5 % (ref 3–12)
MUCUS: ABNORMAL
NITRITE, URINE: NEGATIVE
NRBC AUTOMATED: 0 PER 100 WBC
OTHER OBSERVATIONS UA: ABNORMAL
PDW BLD-RTO: 16.6 % (ref 11.8–14.4)
PH UA: 5 (ref 5–9)
PLATELET # BLD: 263 K/UL (ref 138–453)
PLATELET ESTIMATE: ABNORMAL
PMV BLD AUTO: 11.5 FL (ref 8.1–13.5)
POTASSIUM SERPL-SCNC: 4.8 MMOL/L (ref 3.7–5.3)
PRO-BNP: ABNORMAL PG/ML
PROTEIN UA: ABNORMAL
RBC # BLD: 3.66 M/UL (ref 3.95–5.11)
RBC # BLD: ABNORMAL 10*6/UL
RBC UA: ABNORMAL /HPF (ref 0–2)
RENAL EPITHELIAL, UA: ABNORMAL /HPF
SEG NEUTROPHILS: 77 % (ref 36–65)
SEGMENTED NEUTROPHILS ABSOLUTE COUNT: 6.08 K/UL (ref 1.5–8.1)
SODIUM BLD-SCNC: 137 MMOL/L (ref 135–144)
SPECIFIC GRAVITY UA: 1.02 (ref 1.01–1.02)
THYROXINE, FREE: 1.7 NG/DL (ref 0.93–1.7)
TOTAL PROTEIN: 7.3 G/DL (ref 6.4–8.3)
TRICHOMONAS: ABNORMAL
TSH SERPL DL<=0.05 MIU/L-ACNC: 1.24 MIU/L (ref 0.3–5)
TURBIDITY: CLEAR
URINE HGB: ABNORMAL
UROBILINOGEN, URINE: NORMAL
WBC # BLD: 8 K/UL (ref 3.5–11.3)
WBC # BLD: ABNORMAL 10*3/UL
WBC UA: ABNORMAL /HPF (ref 0–5)
YEAST: ABNORMAL

## 2019-02-01 PROCEDURE — 94640 AIRWAY INHALATION TREATMENT: CPT

## 2019-02-01 PROCEDURE — 83880 ASSAY OF NATRIURETIC PEPTIDE: CPT

## 2019-02-01 PROCEDURE — 97162 PT EVAL MOD COMPLEX 30 MIN: CPT

## 2019-02-01 PROCEDURE — 2580000003 HC RX 258: Performed by: PHYSICIAN ASSISTANT

## 2019-02-01 PROCEDURE — 94664 DEMO&/EVAL PT USE INHALER: CPT

## 2019-02-01 PROCEDURE — 93005 ELECTROCARDIOGRAM TRACING: CPT

## 2019-02-01 PROCEDURE — 84439 ASSAY OF FREE THYROXINE: CPT

## 2019-02-01 PROCEDURE — 71046 X-RAY EXAM CHEST 2 VIEWS: CPT

## 2019-02-01 PROCEDURE — 93306 TTE W/DOPPLER COMPLETE: CPT

## 2019-02-01 PROCEDURE — 80053 COMPREHEN METABOLIC PANEL: CPT

## 2019-02-01 PROCEDURE — 36415 COLL VENOUS BLD VENIPUNCTURE: CPT

## 2019-02-01 PROCEDURE — 1200000000 HC SEMI PRIVATE

## 2019-02-01 PROCEDURE — 84443 ASSAY THYROID STIM HORMONE: CPT

## 2019-02-01 PROCEDURE — 85025 COMPLETE CBC W/AUTO DIFF WBC: CPT

## 2019-02-01 PROCEDURE — 51702 INSERT TEMP BLADDER CATH: CPT

## 2019-02-01 PROCEDURE — 87086 URINE CULTURE/COLONY COUNT: CPT

## 2019-02-01 PROCEDURE — 82947 ASSAY GLUCOSE BLOOD QUANT: CPT

## 2019-02-01 PROCEDURE — 99222 1ST HOSP IP/OBS MODERATE 55: CPT | Performed by: INTERNAL MEDICINE

## 2019-02-01 PROCEDURE — 6370000000 HC RX 637 (ALT 250 FOR IP): Performed by: PHYSICIAN ASSISTANT

## 2019-02-01 PROCEDURE — 2700000000 HC OXYGEN THERAPY PER DAY

## 2019-02-01 PROCEDURE — 6360000002 HC RX W HCPCS: Performed by: PHYSICIAN ASSISTANT

## 2019-02-01 PROCEDURE — 97166 OT EVAL MOD COMPLEX 45 MIN: CPT

## 2019-02-01 PROCEDURE — 81001 URINALYSIS AUTO W/SCOPE: CPT

## 2019-02-01 PROCEDURE — 6370000000 HC RX 637 (ALT 250 FOR IP)

## 2019-02-01 RX ORDER — GLIMEPIRIDE 1 MG/1
1 TABLET ORAL
Status: DISCONTINUED | OUTPATIENT
Start: 2019-02-02 | End: 2019-02-01

## 2019-02-01 RX ORDER — POLYETHYLENE GLYCOL 3350 17 G/17G
17 POWDER, FOR SOLUTION ORAL DAILY
Status: DISCONTINUED | OUTPATIENT
Start: 2019-02-01 | End: 2019-02-06 | Stop reason: HOSPADM

## 2019-02-01 RX ORDER — CHOLECALCIFEROL (VITAMIN D3) 125 MCG
4 CAPSULE ORAL
Status: DISCONTINUED | OUTPATIENT
Start: 2019-02-01 | End: 2019-02-02

## 2019-02-01 RX ORDER — ROSUVASTATIN CALCIUM 5 MG/1
5 TABLET, COATED ORAL NIGHTLY
Status: DISCONTINUED | OUTPATIENT
Start: 2019-02-01 | End: 2019-02-06 | Stop reason: HOSPADM

## 2019-02-01 RX ORDER — SODIUM CHLORIDE 0.9 % (FLUSH) 0.9 %
10 SYRINGE (ML) INJECTION EVERY 12 HOURS SCHEDULED
Status: DISCONTINUED | OUTPATIENT
Start: 2019-02-01 | End: 2019-02-06 | Stop reason: HOSPADM

## 2019-02-01 RX ORDER — POTASSIUM CHLORIDE 20MEQ/15ML
40 LIQUID (ML) ORAL PRN
Status: DISCONTINUED | OUTPATIENT
Start: 2019-02-01 | End: 2019-02-06 | Stop reason: HOSPADM

## 2019-02-01 RX ORDER — POTASSIUM CHLORIDE 20 MEQ/1
40 TABLET, EXTENDED RELEASE ORAL PRN
Status: DISCONTINUED | OUTPATIENT
Start: 2019-02-01 | End: 2019-02-06 | Stop reason: HOSPADM

## 2019-02-01 RX ORDER — CLOPIDOGREL BISULFATE 75 MG/1
75 TABLET ORAL DAILY
Status: DISCONTINUED | OUTPATIENT
Start: 2019-02-02 | End: 2019-02-06 | Stop reason: HOSPADM

## 2019-02-01 RX ORDER — ZOLPIDEM TARTRATE 5 MG/1
5 TABLET ORAL NIGHTLY PRN
Status: DISCONTINUED | OUTPATIENT
Start: 2019-02-01 | End: 2019-02-06 | Stop reason: HOSPADM

## 2019-02-01 RX ORDER — IPRATROPIUM BROMIDE AND ALBUTEROL SULFATE 2.5; .5 MG/3ML; MG/3ML
3 SOLUTION RESPIRATORY (INHALATION) 3 TIMES DAILY
Status: DISCONTINUED | OUTPATIENT
Start: 2019-02-01 | End: 2019-02-06 | Stop reason: HOSPADM

## 2019-02-01 RX ORDER — DEXTROSE MONOHYDRATE 50 MG/ML
100 INJECTION, SOLUTION INTRAVENOUS PRN
Status: DISCONTINUED | OUTPATIENT
Start: 2019-02-01 | End: 2019-02-06 | Stop reason: HOSPADM

## 2019-02-01 RX ORDER — FERROUS SULFATE 325(65) MG
325 TABLET ORAL
Status: DISCONTINUED | OUTPATIENT
Start: 2019-02-02 | End: 2019-02-06 | Stop reason: HOSPADM

## 2019-02-01 RX ORDER — IPRATROPIUM BROMIDE AND ALBUTEROL SULFATE 2.5; .5 MG/3ML; MG/3ML
3 SOLUTION RESPIRATORY (INHALATION) 4 TIMES DAILY
Status: DISCONTINUED | OUTPATIENT
Start: 2019-02-01 | End: 2019-02-01

## 2019-02-01 RX ORDER — ONDANSETRON 2 MG/ML
4 INJECTION INTRAMUSCULAR; INTRAVENOUS EVERY 6 HOURS PRN
Status: DISCONTINUED | OUTPATIENT
Start: 2019-02-01 | End: 2019-02-06 | Stop reason: HOSPADM

## 2019-02-01 RX ORDER — FUROSEMIDE 10 MG/ML
40 INJECTION INTRAMUSCULAR; INTRAVENOUS 2 TIMES DAILY
Status: DISCONTINUED | OUTPATIENT
Start: 2019-02-01 | End: 2019-02-03

## 2019-02-01 RX ORDER — DEXTROSE MONOHYDRATE 25 G/50ML
12.5 INJECTION, SOLUTION INTRAVENOUS PRN
Status: DISCONTINUED | OUTPATIENT
Start: 2019-02-01 | End: 2019-02-06 | Stop reason: HOSPADM

## 2019-02-01 RX ORDER — LEVOTHYROXINE SODIUM 88 UG/1
88 TABLET ORAL DAILY
Status: DISCONTINUED | OUTPATIENT
Start: 2019-02-02 | End: 2019-02-06 | Stop reason: HOSPADM

## 2019-02-01 RX ORDER — FAMOTIDINE 20 MG/1
20 TABLET, FILM COATED ORAL NIGHTLY
Status: DISCONTINUED | OUTPATIENT
Start: 2019-02-01 | End: 2019-02-03

## 2019-02-01 RX ORDER — ALLOPURINOL 100 MG/1
200 TABLET ORAL DAILY
Status: DISCONTINUED | OUTPATIENT
Start: 2019-02-02 | End: 2019-02-03 | Stop reason: DRUGHIGH

## 2019-02-01 RX ORDER — NICOTINE POLACRILEX 4 MG
15 LOZENGE BUCCAL PRN
Status: DISCONTINUED | OUTPATIENT
Start: 2019-02-01 | End: 2019-02-06 | Stop reason: HOSPADM

## 2019-02-01 RX ORDER — POTASSIUM CHLORIDE 7.45 MG/ML
10 INJECTION INTRAVENOUS PRN
Status: DISCONTINUED | OUTPATIENT
Start: 2019-02-01 | End: 2019-02-06 | Stop reason: HOSPADM

## 2019-02-01 RX ORDER — POTASSIUM CHLORIDE 750 MG/1
10 TABLET, EXTENDED RELEASE ORAL DAILY
Status: DISCONTINUED | OUTPATIENT
Start: 2019-02-02 | End: 2019-02-04

## 2019-02-01 RX ORDER — GABAPENTIN 100 MG/1
100 CAPSULE ORAL 3 TIMES DAILY
Status: DISCONTINUED | OUTPATIENT
Start: 2019-02-01 | End: 2019-02-03 | Stop reason: DRUGHIGH

## 2019-02-01 RX ORDER — DOCUSATE SODIUM 100 MG/1
100 CAPSULE, LIQUID FILLED ORAL 2 TIMES DAILY
Status: DISCONTINUED | OUTPATIENT
Start: 2019-02-01 | End: 2019-02-06 | Stop reason: HOSPADM

## 2019-02-01 RX ORDER — MAGNESIUM SULFATE 1 G/100ML
1 INJECTION INTRAVENOUS PRN
Status: DISCONTINUED | OUTPATIENT
Start: 2019-02-01 | End: 2019-02-06 | Stop reason: HOSPADM

## 2019-02-01 RX ORDER — ONDANSETRON 4 MG/1
4 TABLET, ORALLY DISINTEGRATING ORAL EVERY 4 HOURS PRN
Status: DISCONTINUED | OUTPATIENT
Start: 2019-02-01 | End: 2019-02-06 | Stop reason: HOSPADM

## 2019-02-01 RX ORDER — SODIUM CHLORIDE 0.9 % (FLUSH) 0.9 %
10 SYRINGE (ML) INJECTION PRN
Status: DISCONTINUED | OUTPATIENT
Start: 2019-02-01 | End: 2019-02-06 | Stop reason: HOSPADM

## 2019-02-01 RX ORDER — HYDROCHLOROTHIAZIDE 25 MG/1
12.5 TABLET ORAL DAILY
Status: DISCONTINUED | OUTPATIENT
Start: 2019-02-01 | End: 2019-02-01

## 2019-02-01 RX ORDER — AMLODIPINE BESYLATE 10 MG/1
10 TABLET ORAL DAILY
Status: DISCONTINUED | OUTPATIENT
Start: 2019-02-02 | End: 2019-02-05

## 2019-02-01 RX ORDER — ALBUTEROL SULFATE 2.5 MG/3ML
2.5 SOLUTION RESPIRATORY (INHALATION) EVERY 4 HOURS PRN
Status: DISCONTINUED | OUTPATIENT
Start: 2019-02-01 | End: 2019-02-06 | Stop reason: HOSPADM

## 2019-02-01 RX ORDER — ACETAMINOPHEN 325 MG/1
650 TABLET ORAL EVERY 8 HOURS PRN
Status: DISCONTINUED | OUTPATIENT
Start: 2019-02-01 | End: 2019-02-06 | Stop reason: HOSPADM

## 2019-02-01 RX ADMIN — GABAPENTIN 100 MG: 100 CAPSULE ORAL at 20:58

## 2019-02-01 RX ADMIN — INSULIN LISPRO 1 UNITS: 100 INJECTION, SOLUTION INTRAVENOUS; SUBCUTANEOUS at 20:58

## 2019-02-01 RX ADMIN — FUROSEMIDE 40 MG: 10 INJECTION, SOLUTION INTRAMUSCULAR; INTRAVENOUS at 16:16

## 2019-02-01 RX ADMIN — DOCUSATE SODIUM 100 MG: 100 CAPSULE, LIQUID FILLED ORAL at 20:57

## 2019-02-01 RX ADMIN — FUROSEMIDE 40 MG: 10 INJECTION, SOLUTION INTRAMUSCULAR; INTRAVENOUS at 20:58

## 2019-02-01 RX ADMIN — GABAPENTIN 100 MG: 100 CAPSULE ORAL at 16:16

## 2019-02-01 RX ADMIN — FAMOTIDINE 20 MG: 20 TABLET, FILM COATED ORAL at 20:58

## 2019-02-01 RX ADMIN — IPRATROPIUM BROMIDE AND ALBUTEROL SULFATE 3 ML: .5; 3 SOLUTION RESPIRATORY (INHALATION) at 21:19

## 2019-02-01 RX ADMIN — POLYETHYLENE GLYCOL 3350 17 G: 17 POWDER, FOR SOLUTION ORAL at 16:16

## 2019-02-01 RX ADMIN — Medication 10 ML: at 20:58

## 2019-02-01 RX ADMIN — IPRATROPIUM BROMIDE AND ALBUTEROL SULFATE 3 ML: .5; 3 SOLUTION RESPIRATORY (INHALATION) at 16:40

## 2019-02-01 RX ADMIN — INSULIN LISPRO 2 UNITS: 100 INJECTION, SOLUTION INTRAVENOUS; SUBCUTANEOUS at 16:21

## 2019-02-01 RX ADMIN — ROSUVASTATIN CALCIUM 5 MG: 5 TABLET, FILM COATED ORAL at 20:58

## 2019-02-02 LAB
ABSOLUTE EOS #: 0.24 K/UL (ref 0–0.44)
ABSOLUTE IMMATURE GRANULOCYTE: <0.03 K/UL (ref 0–0.3)
ABSOLUTE LYMPH #: 1.29 K/UL (ref 1.1–3.7)
ABSOLUTE MONO #: 0.31 K/UL (ref 0.1–1.2)
ALBUMIN SERPL-MCNC: 3.2 G/DL (ref 3.5–5.2)
ALBUMIN/GLOBULIN RATIO: 0.9 (ref 1–2.5)
ALP BLD-CCNC: 90 U/L (ref 35–104)
ALT SERPL-CCNC: 7 U/L (ref 5–33)
ANION GAP SERPL CALCULATED.3IONS-SCNC: 12 MMOL/L (ref 9–17)
AST SERPL-CCNC: 12 U/L
BASOPHILS # BLD: 0 % (ref 0–2)
BASOPHILS ABSOLUTE: <0.03 K/UL (ref 0–0.2)
BILIRUB SERPL-MCNC: 0.23 MG/DL (ref 0.3–1.2)
BUN BLDV-MCNC: 58 MG/DL (ref 8–23)
BUN/CREAT BLD: 22 (ref 9–20)
CALCIUM SERPL-MCNC: 8.8 MG/DL (ref 8.6–10.4)
CHLORIDE BLD-SCNC: 93 MMOL/L (ref 98–107)
CHOLESTEROL/HDL RATIO: 2.1
CHOLESTEROL: 91 MG/DL
CO2: 29 MMOL/L (ref 20–31)
CREAT SERPL-MCNC: 2.65 MG/DL (ref 0.5–0.9)
CULTURE: NO GROWTH
DIFFERENTIAL TYPE: ABNORMAL
EOSINOPHILS RELATIVE PERCENT: 4 % (ref 1–4)
GFR AFRICAN AMERICAN: 20 ML/MIN
GFR NON-AFRICAN AMERICAN: 17 ML/MIN
GFR SERPL CREATININE-BSD FRML MDRD: ABNORMAL ML/MIN/{1.73_M2}
GFR SERPL CREATININE-BSD FRML MDRD: ABNORMAL ML/MIN/{1.73_M2}
GLUCOSE BLD-MCNC: 145 MG/DL (ref 74–100)
GLUCOSE BLD-MCNC: 164 MG/DL (ref 74–100)
GLUCOSE BLD-MCNC: 215 MG/DL (ref 74–100)
GLUCOSE BLD-MCNC: 74 MG/DL (ref 70–99)
GLUCOSE BLD-MCNC: 76 MG/DL (ref 74–100)
HCT VFR BLD CALC: 31.9 % (ref 36.3–47.1)
HDLC SERPL-MCNC: 44 MG/DL
HEMOGLOBIN: 9.7 G/DL (ref 11.9–15.1)
IMMATURE GRANULOCYTES: 0 %
LDL CHOLESTEROL: 35 MG/DL (ref 0–130)
LYMPHOCYTES # BLD: 21 % (ref 24–43)
Lab: NORMAL
MCH RBC QN AUTO: 26 PG (ref 25.2–33.5)
MCHC RBC AUTO-ENTMCNC: 30.4 G/DL (ref 28.4–34.8)
MCV RBC AUTO: 85.5 FL (ref 82.6–102.9)
MONOCYTES # BLD: 5 % (ref 3–12)
NRBC AUTOMATED: 0 PER 100 WBC
PDW BLD-RTO: 16.5 % (ref 11.8–14.4)
PLATELET # BLD: 249 K/UL (ref 138–453)
PLATELET ESTIMATE: ABNORMAL
PMV BLD AUTO: 11.7 FL (ref 8.1–13.5)
POTASSIUM SERPL-SCNC: 4.5 MMOL/L (ref 3.7–5.3)
RBC # BLD: 3.73 M/UL (ref 3.95–5.11)
RBC # BLD: ABNORMAL 10*6/UL
SEG NEUTROPHILS: 70 % (ref 36–65)
SEGMENTED NEUTROPHILS ABSOLUTE COUNT: 4.31 K/UL (ref 1.5–8.1)
SODIUM BLD-SCNC: 134 MMOL/L (ref 135–144)
SPECIMEN DESCRIPTION: NORMAL
STATUS: NORMAL
TOTAL PROTEIN: 6.8 G/DL (ref 6.4–8.3)
TRIGL SERPL-MCNC: 62 MG/DL
VLDLC SERPL CALC-MCNC: NORMAL MG/DL (ref 1–30)
WBC # BLD: 6.2 K/UL (ref 3.5–11.3)
WBC # BLD: ABNORMAL 10*3/UL

## 2019-02-02 PROCEDURE — 97116 GAIT TRAINING THERAPY: CPT

## 2019-02-02 PROCEDURE — 97110 THERAPEUTIC EXERCISES: CPT

## 2019-02-02 PROCEDURE — 94640 AIRWAY INHALATION TREATMENT: CPT

## 2019-02-02 PROCEDURE — 6360000002 HC RX W HCPCS: Performed by: PHYSICIAN ASSISTANT

## 2019-02-02 PROCEDURE — 6360000002 HC RX W HCPCS: Performed by: INTERNAL MEDICINE

## 2019-02-02 PROCEDURE — 2700000000 HC OXYGEN THERAPY PER DAY

## 2019-02-02 PROCEDURE — 6370000000 HC RX 637 (ALT 250 FOR IP)

## 2019-02-02 PROCEDURE — 6370000000 HC RX 637 (ALT 250 FOR IP): Performed by: PHYSICIAN ASSISTANT

## 2019-02-02 PROCEDURE — 80061 LIPID PANEL: CPT

## 2019-02-02 PROCEDURE — 2580000003 HC RX 258: Performed by: PHYSICIAN ASSISTANT

## 2019-02-02 PROCEDURE — 1200000000 HC SEMI PRIVATE

## 2019-02-02 PROCEDURE — 97530 THERAPEUTIC ACTIVITIES: CPT

## 2019-02-02 PROCEDURE — 36415 COLL VENOUS BLD VENIPUNCTURE: CPT

## 2019-02-02 PROCEDURE — 85025 COMPLETE CBC W/AUTO DIFF WBC: CPT

## 2019-02-02 PROCEDURE — 82947 ASSAY GLUCOSE BLOOD QUANT: CPT

## 2019-02-02 PROCEDURE — 80053 COMPREHEN METABOLIC PANEL: CPT

## 2019-02-02 RX ADMIN — Medication 12000 UNITS: at 12:19

## 2019-02-02 RX ADMIN — FUROSEMIDE 40 MG: 10 INJECTION, SOLUTION INTRAMUSCULAR; INTRAVENOUS at 08:36

## 2019-02-02 RX ADMIN — LEVOTHYROXINE SODIUM 88 MCG: 88 TABLET ORAL at 06:56

## 2019-02-02 RX ADMIN — IPRATROPIUM BROMIDE AND ALBUTEROL SULFATE 3 ML: .5; 3 SOLUTION RESPIRATORY (INHALATION) at 14:53

## 2019-02-02 RX ADMIN — ALLOPURINOL 200 MG: 100 TABLET ORAL at 08:35

## 2019-02-02 RX ADMIN — FAMOTIDINE 20 MG: 20 TABLET, FILM COATED ORAL at 20:50

## 2019-02-02 RX ADMIN — FUROSEMIDE 40 MG: 10 INJECTION, SOLUTION INTRAMUSCULAR; INTRAVENOUS at 20:50

## 2019-02-02 RX ADMIN — Medication 10 ML: at 20:52

## 2019-02-02 RX ADMIN — AMLODIPINE BESYLATE 10 MG: 10 TABLET ORAL at 08:35

## 2019-02-02 RX ADMIN — GABAPENTIN 100 MG: 100 CAPSULE ORAL at 20:50

## 2019-02-02 RX ADMIN — DOCUSATE SODIUM 100 MG: 100 CAPSULE, LIQUID FILLED ORAL at 20:50

## 2019-02-02 RX ADMIN — Medication 10 ML: at 08:36

## 2019-02-02 RX ADMIN — INSULIN LISPRO 2 UNITS: 100 INJECTION, SOLUTION INTRAVENOUS; SUBCUTANEOUS at 16:48

## 2019-02-02 RX ADMIN — INSULIN LISPRO 2 UNITS: 100 INJECTION, SOLUTION INTRAVENOUS; SUBCUTANEOUS at 12:19

## 2019-02-02 RX ADMIN — IPRATROPIUM BROMIDE AND ALBUTEROL SULFATE 3 ML: .5; 3 SOLUTION RESPIRATORY (INHALATION) at 18:54

## 2019-02-02 RX ADMIN — Medication 12000 UNITS: at 08:39

## 2019-02-02 RX ADMIN — DOCUSATE SODIUM 100 MG: 100 CAPSULE, LIQUID FILLED ORAL at 08:35

## 2019-02-02 RX ADMIN — GABAPENTIN 100 MG: 100 CAPSULE ORAL at 14:36

## 2019-02-02 RX ADMIN — CLOPIDOGREL BISULFATE 75 MG: 75 TABLET ORAL at 08:35

## 2019-02-02 RX ADMIN — INSULIN LISPRO 2 UNITS: 100 INJECTION, SOLUTION INTRAVENOUS; SUBCUTANEOUS at 20:50

## 2019-02-02 RX ADMIN — ROSUVASTATIN CALCIUM 5 MG: 5 TABLET, FILM COATED ORAL at 20:50

## 2019-02-02 RX ADMIN — POLYETHYLENE GLYCOL 3350 17 G: 17 POWDER, FOR SOLUTION ORAL at 08:36

## 2019-02-02 RX ADMIN — GABAPENTIN 100 MG: 100 CAPSULE ORAL at 08:35

## 2019-02-02 RX ADMIN — POTASSIUM CHLORIDE 10 MEQ: 10 TABLET, EXTENDED RELEASE ORAL at 08:35

## 2019-02-02 RX ADMIN — ENOXAPARIN SODIUM 30 MG: 30 INJECTION SUBCUTANEOUS at 08:36

## 2019-02-02 RX ADMIN — FERROUS SULFATE TAB 325 MG (65 MG ELEMENTAL FE) 325 MG: 325 (65 FE) TAB at 08:35

## 2019-02-03 LAB
ABSOLUTE EOS #: 0.21 K/UL (ref 0–0.44)
ABSOLUTE IMMATURE GRANULOCYTE: <0.03 K/UL (ref 0–0.3)
ABSOLUTE LYMPH #: 1.08 K/UL (ref 1.1–3.7)
ABSOLUTE MONO #: 0.29 K/UL (ref 0.1–1.2)
ALBUMIN SERPL-MCNC: 2.9 G/DL (ref 3.5–5.2)
ALBUMIN/GLOBULIN RATIO: 0.9 (ref 1–2.5)
ALP BLD-CCNC: 82 U/L (ref 35–104)
ALT SERPL-CCNC: 6 U/L (ref 5–33)
ANION GAP SERPL CALCULATED.3IONS-SCNC: 12 MMOL/L (ref 9–17)
AST SERPL-CCNC: 10 U/L
BASOPHILS # BLD: 0 % (ref 0–2)
BASOPHILS ABSOLUTE: <0.03 K/UL (ref 0–0.2)
BILIRUB SERPL-MCNC: 0.23 MG/DL (ref 0.3–1.2)
BUN BLDV-MCNC: 61 MG/DL (ref 8–23)
BUN/CREAT BLD: 20 (ref 9–20)
CALCIUM SERPL-MCNC: 8.3 MG/DL (ref 8.6–10.4)
CHLORIDE BLD-SCNC: 95 MMOL/L (ref 98–107)
CO2: 30 MMOL/L (ref 20–31)
CREAT SERPL-MCNC: 3.12 MG/DL (ref 0.5–0.9)
DIFFERENTIAL TYPE: ABNORMAL
EOSINOPHILS RELATIVE PERCENT: 3 % (ref 1–4)
GFR AFRICAN AMERICAN: 17 ML/MIN
GFR NON-AFRICAN AMERICAN: 14 ML/MIN
GFR SERPL CREATININE-BSD FRML MDRD: ABNORMAL ML/MIN/{1.73_M2}
GFR SERPL CREATININE-BSD FRML MDRD: ABNORMAL ML/MIN/{1.73_M2}
GLUCOSE BLD-MCNC: 131 MG/DL (ref 74–100)
GLUCOSE BLD-MCNC: 213 MG/DL (ref 74–100)
GLUCOSE BLD-MCNC: 267 MG/DL (ref 74–100)
GLUCOSE BLD-MCNC: 70 MG/DL (ref 74–100)
GLUCOSE BLD-MCNC: 75 MG/DL (ref 70–99)
HCT VFR BLD CALC: 29.3 % (ref 36.3–47.1)
HEMOGLOBIN: 8.7 G/DL (ref 11.9–15.1)
IMMATURE GRANULOCYTES: 0 %
LYMPHOCYTES # BLD: 17 % (ref 24–43)
MCH RBC QN AUTO: 25.8 PG (ref 25.2–33.5)
MCHC RBC AUTO-ENTMCNC: 29.7 G/DL (ref 28.4–34.8)
MCV RBC AUTO: 86.9 FL (ref 82.6–102.9)
MONOCYTES # BLD: 5 % (ref 3–12)
NRBC AUTOMATED: 0 PER 100 WBC
PDW BLD-RTO: 16.7 % (ref 11.8–14.4)
PLATELET # BLD: 220 K/UL (ref 138–453)
PLATELET ESTIMATE: ABNORMAL
PMV BLD AUTO: 10.9 FL (ref 8.1–13.5)
POTASSIUM SERPL-SCNC: 4.7 MMOL/L (ref 3.7–5.3)
RBC # BLD: 3.37 M/UL (ref 3.95–5.11)
RBC # BLD: ABNORMAL 10*6/UL
SEG NEUTROPHILS: 75 % (ref 36–65)
SEGMENTED NEUTROPHILS ABSOLUTE COUNT: 4.64 K/UL (ref 1.5–8.1)
SODIUM BLD-SCNC: 137 MMOL/L (ref 135–144)
TOTAL PROTEIN: 6.2 G/DL (ref 6.4–8.3)
WBC # BLD: 6.3 K/UL (ref 3.5–11.3)
WBC # BLD: ABNORMAL 10*3/UL

## 2019-02-03 PROCEDURE — 85025 COMPLETE CBC W/AUTO DIFF WBC: CPT

## 2019-02-03 PROCEDURE — 94640 AIRWAY INHALATION TREATMENT: CPT

## 2019-02-03 PROCEDURE — 6360000002 HC RX W HCPCS: Performed by: INTERNAL MEDICINE

## 2019-02-03 PROCEDURE — 97530 THERAPEUTIC ACTIVITIES: CPT

## 2019-02-03 PROCEDURE — 2580000003 HC RX 258: Performed by: PHYSICIAN ASSISTANT

## 2019-02-03 PROCEDURE — 97110 THERAPEUTIC EXERCISES: CPT

## 2019-02-03 PROCEDURE — 6370000000 HC RX 637 (ALT 250 FOR IP): Performed by: INTERNAL MEDICINE

## 2019-02-03 PROCEDURE — 82947 ASSAY GLUCOSE BLOOD QUANT: CPT

## 2019-02-03 PROCEDURE — 94664 DEMO&/EVAL PT USE INHALER: CPT

## 2019-02-03 PROCEDURE — 6370000000 HC RX 637 (ALT 250 FOR IP): Performed by: PHYSICIAN ASSISTANT

## 2019-02-03 PROCEDURE — 1200000000 HC SEMI PRIVATE

## 2019-02-03 PROCEDURE — 80053 COMPREHEN METABOLIC PANEL: CPT

## 2019-02-03 PROCEDURE — 6370000000 HC RX 637 (ALT 250 FOR IP)

## 2019-02-03 PROCEDURE — 6360000002 HC RX W HCPCS: Performed by: PHYSICIAN ASSISTANT

## 2019-02-03 PROCEDURE — 2700000000 HC OXYGEN THERAPY PER DAY

## 2019-02-03 PROCEDURE — 36415 COLL VENOUS BLD VENIPUNCTURE: CPT

## 2019-02-03 RX ORDER — GABAPENTIN 100 MG/1
100 CAPSULE ORAL NIGHTLY
Status: DISCONTINUED | OUTPATIENT
Start: 2019-02-03 | End: 2019-02-06 | Stop reason: HOSPADM

## 2019-02-03 RX ORDER — FUROSEMIDE 10 MG/ML
40 INJECTION INTRAMUSCULAR; INTRAVENOUS DAILY
Status: DISCONTINUED | OUTPATIENT
Start: 2019-02-04 | End: 2019-02-05

## 2019-02-03 RX ORDER — ALLOPURINOL 100 MG/1
100 TABLET ORAL DAILY
Status: DISCONTINUED | OUTPATIENT
Start: 2019-02-04 | End: 2019-02-06 | Stop reason: HOSPADM

## 2019-02-03 RX ORDER — MECLIZINE HYDROCHLORIDE 25 MG/1
25 TABLET ORAL DAILY
Status: DISCONTINUED | OUTPATIENT
Start: 2019-02-03 | End: 2019-02-06 | Stop reason: HOSPADM

## 2019-02-03 RX ORDER — HEPARIN SODIUM 5000 [USP'U]/ML
5000 INJECTION, SOLUTION INTRAVENOUS; SUBCUTANEOUS 2 TIMES DAILY
Status: DISCONTINUED | OUTPATIENT
Start: 2019-02-03 | End: 2019-02-06 | Stop reason: HOSPADM

## 2019-02-03 RX ADMIN — FUROSEMIDE 40 MG: 10 INJECTION, SOLUTION INTRAMUSCULAR; INTRAVENOUS at 08:35

## 2019-02-03 RX ADMIN — CLOPIDOGREL BISULFATE 75 MG: 75 TABLET ORAL at 08:35

## 2019-02-03 RX ADMIN — FERROUS SULFATE TAB 325 MG (65 MG ELEMENTAL FE) 325 MG: 325 (65 FE) TAB at 08:35

## 2019-02-03 RX ADMIN — POLYETHYLENE GLYCOL 3350 17 G: 17 POWDER, FOR SOLUTION ORAL at 08:35

## 2019-02-03 RX ADMIN — IPRATROPIUM BROMIDE AND ALBUTEROL SULFATE 3 ML: .5; 3 SOLUTION RESPIRATORY (INHALATION) at 10:40

## 2019-02-03 RX ADMIN — ALLOPURINOL 200 MG: 100 TABLET ORAL at 08:35

## 2019-02-03 RX ADMIN — DOCUSATE SODIUM 100 MG: 100 CAPSULE, LIQUID FILLED ORAL at 08:35

## 2019-02-03 RX ADMIN — ONDANSETRON 4 MG: 2 INJECTION INTRAMUSCULAR; INTRAVENOUS at 22:26

## 2019-02-03 RX ADMIN — LEVOTHYROXINE SODIUM 88 MCG: 88 TABLET ORAL at 07:23

## 2019-02-03 RX ADMIN — INSULIN LISPRO 6 UNITS: 100 INJECTION, SOLUTION INTRAVENOUS; SUBCUTANEOUS at 16:43

## 2019-02-03 RX ADMIN — INSULIN LISPRO 4 UNITS: 100 INJECTION, SOLUTION INTRAVENOUS; SUBCUTANEOUS at 12:19

## 2019-02-03 RX ADMIN — IPRATROPIUM BROMIDE AND ALBUTEROL SULFATE 3 ML: .5; 3 SOLUTION RESPIRATORY (INHALATION) at 18:54

## 2019-02-03 RX ADMIN — POTASSIUM CHLORIDE 10 MEQ: 10 TABLET, EXTENDED RELEASE ORAL at 08:35

## 2019-02-03 RX ADMIN — MECLIZINE HYDROCHLORIDE 25 MG: 25 TABLET ORAL at 09:56

## 2019-02-03 RX ADMIN — IPRATROPIUM BROMIDE AND ALBUTEROL SULFATE 3 ML: .5; 3 SOLUTION RESPIRATORY (INHALATION) at 15:46

## 2019-02-03 RX ADMIN — DOCUSATE SODIUM 100 MG: 100 CAPSULE, LIQUID FILLED ORAL at 20:50

## 2019-02-03 RX ADMIN — ROSUVASTATIN CALCIUM 5 MG: 5 TABLET, FILM COATED ORAL at 20:50

## 2019-02-03 RX ADMIN — Medication 10 ML: at 08:36

## 2019-02-03 RX ADMIN — AMLODIPINE BESYLATE 10 MG: 10 TABLET ORAL at 08:35

## 2019-02-03 RX ADMIN — MAGNESIUM HYDROXIDE 30 ML: 400 SUSPENSION ORAL at 22:24

## 2019-02-03 RX ADMIN — HEPARIN SODIUM 5000 UNITS: 5000 INJECTION INTRAVENOUS; SUBCUTANEOUS at 08:35

## 2019-02-03 RX ADMIN — Medication 10 ML: at 20:51

## 2019-02-03 RX ADMIN — GABAPENTIN 100 MG: 100 CAPSULE ORAL at 20:50

## 2019-02-03 RX ADMIN — GABAPENTIN 100 MG: 100 CAPSULE ORAL at 08:35

## 2019-02-03 RX ADMIN — HEPARIN SODIUM 5000 UNITS: 5000 INJECTION INTRAVENOUS; SUBCUTANEOUS at 20:50

## 2019-02-04 LAB
ABSOLUTE EOS #: 0.1 K/UL (ref 0–0.44)
ABSOLUTE IMMATURE GRANULOCYTE: <0.03 K/UL (ref 0–0.3)
ABSOLUTE LYMPH #: 1.03 K/UL (ref 1.1–3.7)
ABSOLUTE MONO #: 0.35 K/UL (ref 0.1–1.2)
ALBUMIN SERPL-MCNC: 2.7 G/DL (ref 3.5–5.2)
ALBUMIN/GLOBULIN RATIO: 0.7 (ref 1–2.5)
ALP BLD-CCNC: 84 U/L (ref 35–104)
ALT SERPL-CCNC: 6 U/L (ref 5–33)
ANION GAP SERPL CALCULATED.3IONS-SCNC: 14 MMOL/L (ref 9–17)
AST SERPL-CCNC: 12 U/L
BASOPHILS # BLD: 0 % (ref 0–2)
BASOPHILS ABSOLUTE: <0.03 K/UL (ref 0–0.2)
BILIRUB SERPL-MCNC: 0.28 MG/DL (ref 0.3–1.2)
BUN BLDV-MCNC: 64 MG/DL (ref 8–23)
BUN/CREAT BLD: 21 (ref 9–20)
CALCIUM SERPL-MCNC: 8.4 MG/DL (ref 8.6–10.4)
CHLORIDE BLD-SCNC: 93 MMOL/L (ref 98–107)
CO2: 28 MMOL/L (ref 20–31)
CREAT SERPL-MCNC: 3.07 MG/DL (ref 0.5–0.9)
DIFFERENTIAL TYPE: ABNORMAL
EOSINOPHILS RELATIVE PERCENT: 2 % (ref 1–4)
GFR AFRICAN AMERICAN: 17 ML/MIN
GFR NON-AFRICAN AMERICAN: 14 ML/MIN
GFR SERPL CREATININE-BSD FRML MDRD: ABNORMAL ML/MIN/{1.73_M2}
GFR SERPL CREATININE-BSD FRML MDRD: ABNORMAL ML/MIN/{1.73_M2}
GLUCOSE BLD-MCNC: 139 MG/DL (ref 74–100)
GLUCOSE BLD-MCNC: 146 MG/DL (ref 70–99)
GLUCOSE BLD-MCNC: 155 MG/DL (ref 74–100)
GLUCOSE BLD-MCNC: 178 MG/DL (ref 74–100)
GLUCOSE BLD-MCNC: 227 MG/DL (ref 74–100)
HCT VFR BLD CALC: 30.8 % (ref 36.3–47.1)
HEMOGLOBIN: 9.5 G/DL (ref 11.9–15.1)
IMMATURE GRANULOCYTES: 0 %
LYMPHOCYTES # BLD: 15 % (ref 24–43)
MCH RBC QN AUTO: 26.5 PG (ref 25.2–33.5)
MCHC RBC AUTO-ENTMCNC: 30.8 G/DL (ref 28.4–34.8)
MCV RBC AUTO: 86 FL (ref 82.6–102.9)
MONOCYTES # BLD: 5 % (ref 3–12)
NRBC AUTOMATED: 0 PER 100 WBC
PDW BLD-RTO: 16.6 % (ref 11.8–14.4)
PLATELET # BLD: ABNORMAL K/UL (ref 138–453)
PLATELET ESTIMATE: ABNORMAL
PLATELET, FLUORESCENCE: 213 K/UL (ref 138–453)
PLATELET, IMMATURE FRACTION: 4.6 % (ref 1.1–10.3)
PMV BLD AUTO: ABNORMAL FL (ref 8.1–13.5)
POTASSIUM SERPL-SCNC: 5.4 MMOL/L (ref 3.7–5.3)
RBC # BLD: 3.58 M/UL (ref 3.95–5.11)
RBC # BLD: ABNORMAL 10*6/UL
SEG NEUTROPHILS: 77 % (ref 36–65)
SEGMENTED NEUTROPHILS ABSOLUTE COUNT: 5.19 K/UL (ref 1.5–8.1)
SODIUM BLD-SCNC: 135 MMOL/L (ref 135–144)
TOTAL PROTEIN: 6.5 G/DL (ref 6.4–8.3)
WBC # BLD: 6.7 K/UL (ref 3.5–11.3)
WBC # BLD: ABNORMAL 10*3/UL

## 2019-02-04 PROCEDURE — 6360000002 HC RX W HCPCS: Performed by: INTERNAL MEDICINE

## 2019-02-04 PROCEDURE — 85025 COMPLETE CBC W/AUTO DIFF WBC: CPT

## 2019-02-04 PROCEDURE — 6370000000 HC RX 637 (ALT 250 FOR IP): Performed by: INTERNAL MEDICINE

## 2019-02-04 PROCEDURE — 97116 GAIT TRAINING THERAPY: CPT

## 2019-02-04 PROCEDURE — 6370000000 HC RX 637 (ALT 250 FOR IP)

## 2019-02-04 PROCEDURE — 80053 COMPREHEN METABOLIC PANEL: CPT

## 2019-02-04 PROCEDURE — 6370000000 HC RX 637 (ALT 250 FOR IP): Performed by: PHYSICIAN ASSISTANT

## 2019-02-04 PROCEDURE — 82947 ASSAY GLUCOSE BLOOD QUANT: CPT

## 2019-02-04 PROCEDURE — 2580000003 HC RX 258: Performed by: PHYSICIAN ASSISTANT

## 2019-02-04 PROCEDURE — 1200000000 HC SEMI PRIVATE

## 2019-02-04 PROCEDURE — 36415 COLL VENOUS BLD VENIPUNCTURE: CPT

## 2019-02-04 PROCEDURE — 94640 AIRWAY INHALATION TREATMENT: CPT

## 2019-02-04 PROCEDURE — 2700000000 HC OXYGEN THERAPY PER DAY

## 2019-02-04 PROCEDURE — 97110 THERAPEUTIC EXERCISES: CPT

## 2019-02-04 RX ORDER — BISACODYL 10 MG
10 SUPPOSITORY, RECTAL RECTAL DAILY PRN
Status: DISCONTINUED | OUTPATIENT
Start: 2019-02-04 | End: 2019-02-06 | Stop reason: HOSPADM

## 2019-02-04 RX ADMIN — HEPARIN SODIUM 5000 UNITS: 5000 INJECTION INTRAVENOUS; SUBCUTANEOUS at 08:52

## 2019-02-04 RX ADMIN — IPRATROPIUM BROMIDE AND ALBUTEROL SULFATE 3 ML: .5; 3 SOLUTION RESPIRATORY (INHALATION) at 16:43

## 2019-02-04 RX ADMIN — AMLODIPINE BESYLATE 10 MG: 10 TABLET ORAL at 08:52

## 2019-02-04 RX ADMIN — HEPARIN SODIUM 5000 UNITS: 5000 INJECTION INTRAVENOUS; SUBCUTANEOUS at 20:01

## 2019-02-04 RX ADMIN — CLOPIDOGREL BISULFATE 75 MG: 75 TABLET ORAL at 08:52

## 2019-02-04 RX ADMIN — GABAPENTIN 100 MG: 100 CAPSULE ORAL at 20:01

## 2019-02-04 RX ADMIN — INSULIN LISPRO 2 UNITS: 100 INJECTION, SOLUTION INTRAVENOUS; SUBCUTANEOUS at 20:06

## 2019-02-04 RX ADMIN — LEVOTHYROXINE SODIUM 88 MCG: 88 TABLET ORAL at 07:34

## 2019-02-04 RX ADMIN — ALLOPURINOL 100 MG: 100 TABLET ORAL at 08:52

## 2019-02-04 RX ADMIN — Medication 10 ML: at 08:52

## 2019-02-04 RX ADMIN — INSULIN LISPRO 2 UNITS: 100 INJECTION, SOLUTION INTRAVENOUS; SUBCUTANEOUS at 16:34

## 2019-02-04 RX ADMIN — BISACODYL 10 MG: 10 SUPPOSITORY RECTAL at 20:01

## 2019-02-04 RX ADMIN — DOCUSATE SODIUM 100 MG: 100 CAPSULE, LIQUID FILLED ORAL at 08:52

## 2019-02-04 RX ADMIN — IPRATROPIUM BROMIDE AND ALBUTEROL SULFATE 3 ML: .5; 3 SOLUTION RESPIRATORY (INHALATION) at 19:29

## 2019-02-04 RX ADMIN — Medication 10 ML: at 20:05

## 2019-02-04 RX ADMIN — FUROSEMIDE 40 MG: 10 INJECTION, SOLUTION INTRAMUSCULAR; INTRAVENOUS at 08:52

## 2019-02-04 RX ADMIN — DOCUSATE SODIUM 100 MG: 100 CAPSULE, LIQUID FILLED ORAL at 20:01

## 2019-02-04 RX ADMIN — INSULIN LISPRO 2 UNITS: 100 INJECTION, SOLUTION INTRAVENOUS; SUBCUTANEOUS at 12:05

## 2019-02-04 RX ADMIN — FERROUS SULFATE TAB 325 MG (65 MG ELEMENTAL FE) 325 MG: 325 (65 FE) TAB at 07:34

## 2019-02-04 RX ADMIN — IPRATROPIUM BROMIDE AND ALBUTEROL SULFATE 3 ML: .5; 3 SOLUTION RESPIRATORY (INHALATION) at 10:49

## 2019-02-04 RX ADMIN — MECLIZINE HYDROCHLORIDE 25 MG: 25 TABLET ORAL at 08:52

## 2019-02-04 RX ADMIN — ROSUVASTATIN CALCIUM 5 MG: 5 TABLET, FILM COATED ORAL at 20:01

## 2019-02-04 RX ADMIN — POLYETHYLENE GLYCOL 3350 17 G: 17 POWDER, FOR SOLUTION ORAL at 08:52

## 2019-02-04 ASSESSMENT — PAIN SCALES - GENERAL
PAINLEVEL_OUTOF10: 0

## 2019-02-05 LAB
ABSOLUTE EOS #: 0.14 K/UL (ref 0–0.44)
ABSOLUTE IMMATURE GRANULOCYTE: <0.03 K/UL (ref 0–0.3)
ABSOLUTE LYMPH #: 0.86 K/UL (ref 1.1–3.7)
ABSOLUTE MONO #: 0.34 K/UL (ref 0.1–1.2)
ALBUMIN SERPL-MCNC: 3 G/DL (ref 3.5–5.2)
ALBUMIN/GLOBULIN RATIO: 0.8 (ref 1–2.5)
ALP BLD-CCNC: 108 U/L (ref 35–104)
ALT SERPL-CCNC: 10 U/L (ref 5–33)
ANION GAP SERPL CALCULATED.3IONS-SCNC: 11 MMOL/L (ref 9–17)
AST SERPL-CCNC: 23 U/L
BASOPHILS # BLD: 0 % (ref 0–2)
BASOPHILS ABSOLUTE: 0.03 K/UL (ref 0–0.2)
BILIRUB SERPL-MCNC: 0.33 MG/DL (ref 0.3–1.2)
BUN BLDV-MCNC: 76 MG/DL (ref 8–23)
BUN/CREAT BLD: 21 (ref 9–20)
CALCIUM SERPL-MCNC: 8.7 MG/DL (ref 8.6–10.4)
CHLORIDE BLD-SCNC: 91 MMOL/L (ref 98–107)
CO2: 31 MMOL/L (ref 20–31)
CREAT SERPL-MCNC: 3.62 MG/DL (ref 0.5–0.9)
CREATININE URINE: 124.2 MG/DL (ref 28–217)
DIFFERENTIAL TYPE: ABNORMAL
EOSINOPHILS RELATIVE PERCENT: 2 % (ref 1–4)
GFR AFRICAN AMERICAN: 14 ML/MIN
GFR NON-AFRICAN AMERICAN: 12 ML/MIN
GFR SERPL CREATININE-BSD FRML MDRD: ABNORMAL ML/MIN/{1.73_M2}
GFR SERPL CREATININE-BSD FRML MDRD: ABNORMAL ML/MIN/{1.73_M2}
GLUCOSE BLD-MCNC: 126 MG/DL (ref 70–99)
GLUCOSE BLD-MCNC: 134 MG/DL (ref 74–100)
GLUCOSE BLD-MCNC: 198 MG/DL (ref 74–100)
GLUCOSE BLD-MCNC: 200 MG/DL (ref 74–100)
GLUCOSE BLD-MCNC: 219 MG/DL (ref 74–100)
HCT VFR BLD CALC: 30.3 % (ref 36.3–47.1)
HEMOGLOBIN: 9 G/DL (ref 11.9–15.1)
IMMATURE GRANULOCYTES: 0 %
LYMPHOCYTES # BLD: 13 % (ref 24–43)
MCH RBC QN AUTO: 26 PG (ref 25.2–33.5)
MCHC RBC AUTO-ENTMCNC: 29.7 G/DL (ref 28.4–34.8)
MCV RBC AUTO: 87.6 FL (ref 82.6–102.9)
MONOCYTES # BLD: 5 % (ref 3–12)
NRBC AUTOMATED: 0 PER 100 WBC
PDW BLD-RTO: 16.7 % (ref 11.8–14.4)
PLATELET # BLD: 256 K/UL (ref 138–453)
PLATELET ESTIMATE: ABNORMAL
PMV BLD AUTO: 12.2 FL (ref 8.1–13.5)
POTASSIUM SERPL-SCNC: 4.6 MMOL/L (ref 3.7–5.3)
POTASSIUM SERPL-SCNC: 5.8 MMOL/L (ref 3.7–5.3)
RBC # BLD: 3.46 M/UL (ref 3.95–5.11)
RBC # BLD: ABNORMAL 10*6/UL
SEG NEUTROPHILS: 80 % (ref 36–65)
SEGMENTED NEUTROPHILS ABSOLUTE COUNT: 5.28 K/UL (ref 1.5–8.1)
SODIUM BLD-SCNC: 133 MMOL/L (ref 135–144)
TOTAL PROTEIN, URINE: 60 MG/DL
TOTAL PROTEIN: 6.8 G/DL (ref 6.4–8.3)
URINE TOTAL PROTEIN CREATININE RATIO: 0.48 (ref 0–0.2)
WBC # BLD: 6.7 K/UL (ref 3.5–11.3)
WBC # BLD: ABNORMAL 10*3/UL

## 2019-02-05 PROCEDURE — 84156 ASSAY OF PROTEIN URINE: CPT

## 2019-02-05 PROCEDURE — 36415 COLL VENOUS BLD VENIPUNCTURE: CPT

## 2019-02-05 PROCEDURE — 1200000000 HC SEMI PRIVATE

## 2019-02-05 PROCEDURE — 94664 DEMO&/EVAL PT USE INHALER: CPT

## 2019-02-05 PROCEDURE — 82947 ASSAY GLUCOSE BLOOD QUANT: CPT

## 2019-02-05 PROCEDURE — 82570 ASSAY OF URINE CREATININE: CPT

## 2019-02-05 PROCEDURE — 85025 COMPLETE CBC W/AUTO DIFF WBC: CPT

## 2019-02-05 PROCEDURE — 94640 AIRWAY INHALATION TREATMENT: CPT

## 2019-02-05 PROCEDURE — 97530 THERAPEUTIC ACTIVITIES: CPT

## 2019-02-05 PROCEDURE — 6370000000 HC RX 637 (ALT 250 FOR IP): Performed by: INTERNAL MEDICINE

## 2019-02-05 PROCEDURE — 84132 ASSAY OF SERUM POTASSIUM: CPT

## 2019-02-05 PROCEDURE — 2700000000 HC OXYGEN THERAPY PER DAY

## 2019-02-05 PROCEDURE — 6370000000 HC RX 637 (ALT 250 FOR IP): Performed by: PHYSICIAN ASSISTANT

## 2019-02-05 PROCEDURE — 6370000000 HC RX 637 (ALT 250 FOR IP)

## 2019-02-05 PROCEDURE — 2580000003 HC RX 258: Performed by: PHYSICIAN ASSISTANT

## 2019-02-05 PROCEDURE — 6360000002 HC RX W HCPCS: Performed by: INTERNAL MEDICINE

## 2019-02-05 PROCEDURE — 80053 COMPREHEN METABOLIC PANEL: CPT

## 2019-02-05 RX ORDER — SODIUM POLYSTYRENE SULFONATE 15 G/60ML
30 SUSPENSION ORAL; RECTAL ONCE
Status: COMPLETED | OUTPATIENT
Start: 2019-02-05 | End: 2019-02-05

## 2019-02-05 RX ORDER — FUROSEMIDE 10 MG/ML
80 INJECTION INTRAMUSCULAR; INTRAVENOUS DAILY
Status: DISCONTINUED | OUTPATIENT
Start: 2019-02-06 | End: 2019-02-06 | Stop reason: HOSPADM

## 2019-02-05 RX ADMIN — SODIUM POLYSTYRENE SULFONATE 22.5 G: 15 SUSPENSION ORAL; RECTAL at 09:28

## 2019-02-05 RX ADMIN — HEPARIN SODIUM 5000 UNITS: 5000 INJECTION INTRAVENOUS; SUBCUTANEOUS at 09:31

## 2019-02-05 RX ADMIN — IPRATROPIUM BROMIDE AND ALBUTEROL SULFATE 3 ML: .5; 3 SOLUTION RESPIRATORY (INHALATION) at 11:13

## 2019-02-05 RX ADMIN — FUROSEMIDE 40 MG: 10 INJECTION, SOLUTION INTRAMUSCULAR; INTRAVENOUS at 09:31

## 2019-02-05 RX ADMIN — GABAPENTIN 100 MG: 100 CAPSULE ORAL at 20:14

## 2019-02-05 RX ADMIN — IPRATROPIUM BROMIDE AND ALBUTEROL SULFATE 3 ML: .5; 3 SOLUTION RESPIRATORY (INHALATION) at 15:43

## 2019-02-05 RX ADMIN — Medication 10 ML: at 20:26

## 2019-02-05 RX ADMIN — ALLOPURINOL 100 MG: 100 TABLET ORAL at 09:21

## 2019-02-05 RX ADMIN — FERROUS SULFATE TAB 325 MG (65 MG ELEMENTAL FE) 325 MG: 325 (65 FE) TAB at 08:08

## 2019-02-05 RX ADMIN — HEPARIN SODIUM 5000 UNITS: 5000 INJECTION INTRAVENOUS; SUBCUTANEOUS at 20:15

## 2019-02-05 RX ADMIN — DOCUSATE SODIUM 100 MG: 100 CAPSULE, LIQUID FILLED ORAL at 20:14

## 2019-02-05 RX ADMIN — INSULIN LISPRO 4 UNITS: 100 INJECTION, SOLUTION INTRAVENOUS; SUBCUTANEOUS at 11:53

## 2019-02-05 RX ADMIN — DOCUSATE SODIUM 100 MG: 100 CAPSULE, LIQUID FILLED ORAL at 09:21

## 2019-02-05 RX ADMIN — AMLODIPINE BESYLATE 10 MG: 10 TABLET ORAL at 09:21

## 2019-02-05 RX ADMIN — POLYETHYLENE GLYCOL 3350 17 G: 17 POWDER, FOR SOLUTION ORAL at 09:36

## 2019-02-05 RX ADMIN — MECLIZINE HYDROCHLORIDE 25 MG: 25 TABLET ORAL at 09:21

## 2019-02-05 RX ADMIN — ROSUVASTATIN CALCIUM 5 MG: 5 TABLET, FILM COATED ORAL at 20:14

## 2019-02-05 RX ADMIN — Medication 10 ML: at 09:37

## 2019-02-05 RX ADMIN — CLOPIDOGREL BISULFATE 75 MG: 75 TABLET ORAL at 09:21

## 2019-02-05 RX ADMIN — LEVOTHYROXINE SODIUM 88 MCG: 88 TABLET ORAL at 09:21

## 2019-02-05 ASSESSMENT — PAIN SCALES - GENERAL
PAINLEVEL_OUTOF10: 0

## 2019-02-06 VITALS
RESPIRATION RATE: 18 BRPM | SYSTOLIC BLOOD PRESSURE: 99 MMHG | HEIGHT: 60 IN | BODY MASS INDEX: 30.59 KG/M2 | DIASTOLIC BLOOD PRESSURE: 68 MMHG | HEART RATE: 69 BPM | WEIGHT: 155.8 LBS | OXYGEN SATURATION: 92 % | TEMPERATURE: 97.9 F

## 2019-02-06 LAB
ABSOLUTE EOS #: 0.23 K/UL (ref 0–0.44)
ABSOLUTE IMMATURE GRANULOCYTE: <0.03 K/UL (ref 0–0.3)
ABSOLUTE LYMPH #: 1.3 K/UL (ref 1.1–3.7)
ABSOLUTE MONO #: 0.32 K/UL (ref 0.1–1.2)
ALBUMIN SERPL-MCNC: 3.1 G/DL (ref 3.5–5.2)
ALBUMIN/GLOBULIN RATIO: 1 (ref 1–2.5)
ALP BLD-CCNC: 106 U/L (ref 35–104)
ALT SERPL-CCNC: 12 U/L (ref 5–33)
ANION GAP SERPL CALCULATED.3IONS-SCNC: 12 MMOL/L (ref 9–17)
AST SERPL-CCNC: 21 U/L
BASOPHILS # BLD: 0 % (ref 0–2)
BASOPHILS ABSOLUTE: <0.03 K/UL (ref 0–0.2)
BILIRUB SERPL-MCNC: 0.25 MG/DL (ref 0.3–1.2)
BUN BLDV-MCNC: 74 MG/DL (ref 8–23)
BUN/CREAT BLD: 23 (ref 9–20)
CALCIUM SERPL-MCNC: 8.4 MG/DL (ref 8.6–10.4)
CHLORIDE BLD-SCNC: 92 MMOL/L (ref 98–107)
CO2: 32 MMOL/L (ref 20–31)
CREAT SERPL-MCNC: 3.15 MG/DL (ref 0.5–0.9)
DIFFERENTIAL TYPE: ABNORMAL
EOSINOPHILS RELATIVE PERCENT: 4 % (ref 1–4)
FERRITIN: 50 UG/L (ref 13–150)
GFR AFRICAN AMERICAN: 17 ML/MIN
GFR NON-AFRICAN AMERICAN: 14 ML/MIN
GFR SERPL CREATININE-BSD FRML MDRD: ABNORMAL ML/MIN/{1.73_M2}
GFR SERPL CREATININE-BSD FRML MDRD: ABNORMAL ML/MIN/{1.73_M2}
GLUCOSE BLD-MCNC: 105 MG/DL (ref 74–100)
GLUCOSE BLD-MCNC: 112 MG/DL (ref 70–99)
GLUCOSE BLD-MCNC: 147 MG/DL (ref 74–100)
GLUCOSE BLD-MCNC: 203 MG/DL (ref 74–100)
HCT VFR BLD CALC: 29.2 % (ref 36.3–47.1)
HEMOGLOBIN: 8.7 G/DL (ref 11.9–15.1)
IMMATURE GRANULOCYTES: 0 %
IRON SATURATION: 8 % (ref 20–55)
IRON: 23 UG/DL (ref 37–145)
LYMPHOCYTES # BLD: 23 % (ref 24–43)
MCH RBC QN AUTO: 25.8 PG (ref 25.2–33.5)
MCHC RBC AUTO-ENTMCNC: 29.8 G/DL (ref 28.4–34.8)
MCV RBC AUTO: 86.6 FL (ref 82.6–102.9)
MONOCYTES # BLD: 6 % (ref 3–12)
NRBC AUTOMATED: 0 PER 100 WBC
PDW BLD-RTO: 16.4 % (ref 11.8–14.4)
PHOSPHORUS: 6.8 MG/DL (ref 2.6–4.5)
PLATELET # BLD: 206 K/UL (ref 138–453)
PLATELET ESTIMATE: ABNORMAL
PMV BLD AUTO: 11.5 FL (ref 8.1–13.5)
POTASSIUM SERPL-SCNC: 4.4 MMOL/L (ref 3.7–5.3)
PTH INTACT: 136.9 PG/ML (ref 15–65)
RBC # BLD: 3.37 M/UL (ref 3.95–5.11)
RBC # BLD: ABNORMAL 10*6/UL
SEG NEUTROPHILS: 67 % (ref 36–65)
SEGMENTED NEUTROPHILS ABSOLUTE COUNT: 3.82 K/UL (ref 1.5–8.1)
SODIUM BLD-SCNC: 136 MMOL/L (ref 135–144)
TOTAL IRON BINDING CAPACITY: 287 UG/DL (ref 250–450)
TOTAL PROTEIN: 6.1 G/DL (ref 6.4–8.3)
UNSATURATED IRON BINDING CAPACITY: 264.1 UG/DL (ref 112–347)
URIC ACID: 3.2 MG/DL (ref 2.4–5.7)
WBC # BLD: 5.7 K/UL (ref 3.5–11.3)
WBC # BLD: ABNORMAL 10*3/UL

## 2019-02-06 PROCEDURE — 84550 ASSAY OF BLOOD/URIC ACID: CPT

## 2019-02-06 PROCEDURE — 83970 ASSAY OF PARATHORMONE: CPT

## 2019-02-06 PROCEDURE — 6360000002 HC RX W HCPCS: Performed by: SPECIALIST

## 2019-02-06 PROCEDURE — 84100 ASSAY OF PHOSPHORUS: CPT

## 2019-02-06 PROCEDURE — 83550 IRON BINDING TEST: CPT

## 2019-02-06 PROCEDURE — 36415 COLL VENOUS BLD VENIPUNCTURE: CPT

## 2019-02-06 PROCEDURE — 82947 ASSAY GLUCOSE BLOOD QUANT: CPT

## 2019-02-06 PROCEDURE — 6370000000 HC RX 637 (ALT 250 FOR IP): Performed by: INTERNAL MEDICINE

## 2019-02-06 PROCEDURE — 82728 ASSAY OF FERRITIN: CPT

## 2019-02-06 PROCEDURE — 80053 COMPREHEN METABOLIC PANEL: CPT

## 2019-02-06 PROCEDURE — 83540 ASSAY OF IRON: CPT

## 2019-02-06 PROCEDURE — 85025 COMPLETE CBC W/AUTO DIFF WBC: CPT

## 2019-02-06 PROCEDURE — 6360000002 HC RX W HCPCS: Performed by: INTERNAL MEDICINE

## 2019-02-06 PROCEDURE — 6370000000 HC RX 637 (ALT 250 FOR IP): Performed by: PHYSICIAN ASSISTANT

## 2019-02-06 PROCEDURE — 2580000003 HC RX 258: Performed by: PHYSICIAN ASSISTANT

## 2019-02-06 RX ORDER — FUROSEMIDE 20 MG/1
80 TABLET ORAL DAILY
Qty: 60 TABLET | Refills: 3 | DISCHARGE
Start: 2019-02-06

## 2019-02-06 RX ORDER — POLYETHYLENE GLYCOL 3350 17 G/17G
17 POWDER, FOR SOLUTION ORAL DAILY
Qty: 527 G | Refills: 1 | DISCHARGE
Start: 2019-02-07 | End: 2019-03-09

## 2019-02-06 RX ORDER — PSEUDOEPHEDRINE HCL 30 MG
100 TABLET ORAL 2 TIMES DAILY
DISCHARGE
Start: 2019-02-06

## 2019-02-06 RX ORDER — ALBUTEROL SULFATE 2.5 MG/3ML
2.5 SOLUTION RESPIRATORY (INHALATION) EVERY 4 HOURS PRN
Qty: 120 EACH | Refills: 3 | DISCHARGE
Start: 2019-02-06

## 2019-02-06 RX ORDER — BISACODYL 10 MG
10 SUPPOSITORY, RECTAL RECTAL DAILY PRN
DISCHARGE
Start: 2019-02-06 | End: 2019-03-08

## 2019-02-06 RX ADMIN — MECLIZINE HYDROCHLORIDE 25 MG: 25 TABLET ORAL at 08:40

## 2019-02-06 RX ADMIN — Medication 10 ML: at 08:40

## 2019-02-06 RX ADMIN — MAGNESIUM HYDROXIDE 30 ML: 400 SUSPENSION ORAL at 11:23

## 2019-02-06 RX ADMIN — FERROUS SULFATE TAB 325 MG (65 MG ELEMENTAL FE) 325 MG: 325 (65 FE) TAB at 08:40

## 2019-02-06 RX ADMIN — FUROSEMIDE 80 MG: 10 INJECTION, SOLUTION INTRAMUSCULAR; INTRAVENOUS at 08:39

## 2019-02-06 RX ADMIN — LEVOTHYROXINE SODIUM 88 MCG: 88 TABLET ORAL at 07:11

## 2019-02-06 RX ADMIN — CLOPIDOGREL BISULFATE 75 MG: 75 TABLET ORAL at 08:40

## 2019-02-06 RX ADMIN — POLYETHYLENE GLYCOL 3350 17 G: 17 POWDER, FOR SOLUTION ORAL at 08:39

## 2019-02-06 RX ADMIN — HEPARIN SODIUM 5000 UNITS: 5000 INJECTION INTRAVENOUS; SUBCUTANEOUS at 08:40

## 2019-02-06 RX ADMIN — ALLOPURINOL 100 MG: 100 TABLET ORAL at 08:40

## 2019-02-06 RX ADMIN — DOCUSATE SODIUM 100 MG: 100 CAPSULE, LIQUID FILLED ORAL at 08:40

## 2019-02-06 ASSESSMENT — PAIN SCALES - GENERAL: PAINLEVEL_OUTOF10: 0
